# Patient Record
Sex: FEMALE | Race: WHITE | Employment: OTHER | URBAN - METROPOLITAN AREA
[De-identification: names, ages, dates, MRNs, and addresses within clinical notes are randomized per-mention and may not be internally consistent; named-entity substitution may affect disease eponyms.]

---

## 2022-02-16 ENCOUNTER — HOSPITAL ENCOUNTER (EMERGENCY)
Age: 67
Discharge: HOME | End: 2022-02-16
Payer: MEDICARE

## 2022-02-16 VITALS
HEART RATE: 84 BPM | DIASTOLIC BLOOD PRESSURE: 82 MMHG | OXYGEN SATURATION: 96 % | WEIGHT: 249.12 LBS | BODY MASS INDEX: 41.51 KG/M2 | HEIGHT: 65 IN | TEMPERATURE: 98.3 F | SYSTOLIC BLOOD PRESSURE: 150 MMHG | RESPIRATION RATE: 16 BRPM

## 2022-02-16 DIAGNOSIS — R11.2 NAUSEA VOMITING AND DIARRHEA: Primary | ICD-10-CM

## 2022-02-16 DIAGNOSIS — N39.0 URINARY TRACT INFECTION WITHOUT HEMATURIA, SITE UNSPECIFIED: ICD-10-CM

## 2022-02-16 DIAGNOSIS — R19.7 NAUSEA VOMITING AND DIARRHEA: Primary | ICD-10-CM

## 2022-02-16 LAB
A/G RATIO: 1.2 (ref 1.1–2.2)
ALBUMIN SERPL-MCNC: 4.1 G/DL (ref 3.4–5)
ALP BLD-CCNC: 69 U/L (ref 40–129)
ALT SERPL-CCNC: 14 U/L (ref 10–40)
ANION GAP SERPL CALCULATED.3IONS-SCNC: 12 MMOL/L (ref 3–16)
AST SERPL-CCNC: 20 U/L (ref 15–37)
BACTERIA: ABNORMAL /HPF
BASOPHILS ABSOLUTE: 0 K/UL (ref 0–0.2)
BASOPHILS RELATIVE PERCENT: 0.2 %
BILIRUB SERPL-MCNC: 0.4 MG/DL (ref 0–1)
BILIRUBIN URINE: NEGATIVE
BLOOD, URINE: NEGATIVE
BUN BLDV-MCNC: 16 MG/DL (ref 7–20)
CALCIUM SERPL-MCNC: 9.2 MG/DL (ref 8.3–10.6)
CELLULAR CASTS: ABNORMAL /LPF
CHLORIDE BLD-SCNC: 98 MMOL/L (ref 99–110)
CLARITY: ABNORMAL
CO2: 28 MMOL/L (ref 21–32)
COLOR: YELLOW
COMMENT UA: ABNORMAL
CREAT SERPL-MCNC: 0.5 MG/DL (ref 0.6–1.2)
EOSINOPHILS ABSOLUTE: 0 K/UL (ref 0–0.6)
EOSINOPHILS RELATIVE PERCENT: 0.2 %
EPITHELIAL CELLS, UA: ABNORMAL /HPF (ref 0–5)
GFR AFRICAN AMERICAN: >60
GFR NON-AFRICAN AMERICAN: >60
GLUCOSE BLD-MCNC: 124 MG/DL (ref 70–99)
GLUCOSE URINE: NEGATIVE MG/DL
HCT VFR BLD CALC: 39.8 % (ref 36–48)
HEMOGLOBIN: 13.4 G/DL (ref 12–16)
KETONES, URINE: NEGATIVE MG/DL
LEUKOCYTE ESTERASE, URINE: ABNORMAL
LIPASE: 17 U/L (ref 13–60)
LYMPHOCYTES ABSOLUTE: 0.2 K/UL (ref 1–5.1)
LYMPHOCYTES RELATIVE PERCENT: 4 %
MAGNESIUM: 2.1 MG/DL (ref 1.8–2.4)
MCH RBC QN AUTO: 30.8 PG (ref 26–34)
MCHC RBC AUTO-ENTMCNC: 33.7 G/DL (ref 31–36)
MCV RBC AUTO: 91.5 FL (ref 80–100)
MICROSCOPIC EXAMINATION: YES
MONOCYTES ABSOLUTE: 0.3 K/UL (ref 0–1.3)
MONOCYTES RELATIVE PERCENT: 4.8 %
NEUTROPHILS ABSOLUTE: 4.9 K/UL (ref 1.7–7.7)
NEUTROPHILS RELATIVE PERCENT: 90.8 %
NITRITE, URINE: POSITIVE
PDW BLD-RTO: 14.8 % (ref 12.4–15.4)
PH UA: 6.5 (ref 5–8)
PLATELET # BLD: 137 K/UL (ref 135–450)
PMV BLD AUTO: 8.8 FL (ref 5–10.5)
POTASSIUM REFLEX MAGNESIUM: 3.4 MMOL/L (ref 3.5–5.1)
PROTEIN UA: NEGATIVE MG/DL
RBC # BLD: 4.35 M/UL (ref 4–5.2)
RBC UA: ABNORMAL /HPF (ref 0–4)
SODIUM BLD-SCNC: 138 MMOL/L (ref 136–145)
SPECIFIC GRAVITY UA: 1.02 (ref 1–1.03)
T4 FREE: 1.2 NG/DL (ref 0.9–1.8)
TOTAL PROTEIN: 7.4 G/DL (ref 6.4–8.2)
TSH REFLEX: 6.36 UIU/ML (ref 0.27–4.2)
URINE REFLEX TO CULTURE: ABNORMAL
URINE TYPE: ABNORMAL
UROBILINOGEN, URINE: 0.2 E.U./DL
WBC # BLD: 5.4 K/UL (ref 4–11)
WBC UA: ABNORMAL /HPF (ref 0–5)

## 2022-02-16 PROCEDURE — 81001 URINALYSIS AUTO W/SCOPE: CPT

## 2022-02-16 PROCEDURE — 84443 ASSAY THYROID STIM HORMONE: CPT

## 2022-02-16 PROCEDURE — 80053 COMPREHEN METABOLIC PANEL: CPT

## 2022-02-16 PROCEDURE — 83690 ASSAY OF LIPASE: CPT

## 2022-02-16 PROCEDURE — 84439 ASSAY OF FREE THYROXINE: CPT

## 2022-02-16 PROCEDURE — 83735 ASSAY OF MAGNESIUM: CPT

## 2022-02-16 PROCEDURE — 85025 COMPLETE CBC W/AUTO DIFF WBC: CPT

## 2022-02-16 PROCEDURE — 99282 EMERGENCY DEPT VISIT SF MDM: CPT

## 2022-02-16 RX ORDER — 0.9 % SODIUM CHLORIDE 0.9 %
500 INTRAVENOUS SOLUTION INTRAVENOUS ONCE
Status: DISCONTINUED | OUTPATIENT
Start: 2022-02-16 | End: 2022-02-16 | Stop reason: HOSPADM

## 2022-02-16 RX ORDER — GREEN TEA/HOODIA GORDONII 315-12.5MG
1 CAPSULE ORAL DAILY
Qty: 10 TABLET | Refills: 0 | Status: SHIPPED | OUTPATIENT
Start: 2022-02-16 | End: 2022-02-26

## 2022-02-16 RX ORDER — CEPHALEXIN 500 MG/1
500 CAPSULE ORAL 2 TIMES DAILY
Qty: 6 CAPSULE | Refills: 0 | Status: SHIPPED | OUTPATIENT
Start: 2022-02-16 | End: 2022-02-19

## 2022-02-16 RX ORDER — ONDANSETRON 4 MG/1
4-8 TABLET, ORALLY DISINTEGRATING ORAL EVERY 12 HOURS PRN
Qty: 12 TABLET | Refills: 0 | Status: SHIPPED | OUTPATIENT
Start: 2022-02-16

## 2022-02-16 ASSESSMENT — PAIN SCALES - GENERAL: PAINLEVEL_OUTOF10: 0

## 2022-02-16 ASSESSMENT — ENCOUNTER SYMPTOMS
VOMITING: 1
NAUSEA: 1
RESPIRATORY NEGATIVE: 1

## 2022-02-16 NOTE — ED PROVIDER NOTES
[address]  [address] [address]  Dept: [phone]  Loc: [phone]  eMERGENCYdEPARTMENT eNCOUnter      Pt Name: Hayley Francis  MRN: [id number]  [place] 1955  Date of evaluation: 2/16/2022  Provider:FLAQUITO Strauss CNP     Independently evaluated by the advanced practice provider    CHIEF COMPLAINT       Chief Complaint   Patient presents with    Emesis     since last night. unable to tolerate PO. denies sick contacts    Diarrhea       CRITICAL CARE TIME       HISTORY OF PRESENT ILLNESS  (Location/Symptom, Timing/Onset, Context/Setting, Quality, Duration,Modifying Factors, Severity.)   Hayley Francis is a 78 y.o. female who presents to the emergency department past medical history depression, DM, obesity, GERD, hypertension and thyroid disease peer    Patient presents ambulatory via private vehicle to the emergency department reporting that around midnight last night she started with nausea vomiting and diarrhea. She has had at least 5 episodes of vomiting with emesis production that is nonbilious and nonbloody. She also reports at least 5 episodes of watery loose diarrhea. Denies fever. Denies chills. Denies chest pain or abdominal pain. Denies lightheadedness or dizziness. Patient denies any known ill contacts. She last ate vegetable soup at her sister-in-law's house yesterday evening and then had a scoop of peanut butter before going to bed. She does report she has UTIs periodically. COVID vaccinated. No recent hospitalizations. No new medications. Patient reports that she has not ate anything today and when she drinks water within a couple of hours she vomits. Nursing Notes were reviewedand agreed with or any disagreements were addressed in the HPI.     REVIEW OF SYSTEMS    (2-9 systems for level 4, 10 or more for level 5)     Review of Systems   Constitutional: Positive for appetite change. Respiratory: Negative. Cardiovascular: Negative. Gastrointestinal: Positive for nausea and vomiting. Genitourinary: Negative. Musculoskeletal: Negative. Skin: Negative. Except as noted above the remainder of the review of systems was reviewed and negative. PAST MEDICAL HISTORY         Diagnosis Date    Depression     Diabetes mellitus ([address]     GERD (gastroesophageal reflux disease)     Hypertension     Thyroid disease        SURGICAL HISTORY           Procedure Laterality Date    TOTAL KNEE ARTHROPLASTY         CURRENT MEDICATIONS     [unfilled]    ALLERGIES     Patient has no known allergies. FAMILY HISTORY     History reviewed. No pertinent family history. No family status information on file. SOCIAL HISTORY      reports that she has never smoked. She has never used smokeless tobacco. She reports previous alcohol use. She reports previous drug use. PHYSICAL EXAM    (up to 7 for level 4, 8 or more for level 5)     ED Triage Vitals [02/16/22 1408]   Enc Vitals Group      BP (!) 144/79      Pulse 88      Resp 14      Temp 98.3 °F (36.8 °C)      Temp Source Temporal      SpO2 94 %      Weight 249 lb 1.9 oz (113 kg)      Height 5' 5\" (1.651 m)      Head Circumference       Peak Flow       Pain Score       Pain Loc       Pain Edu? Excl. in [address]? Physical Exam  Vitals and nursing note reviewed. Constitutional:       General: She is not in acute distress. Appearance: Normal appearance. She is obese. She is not ill-appearing or toxic-appearing. Eyes:      Pupils: Pupils are equal, round, and reactive to light. Cardiovascular:      Rate and Rhythm: Normal rate and regular rhythm. Pulses: Normal pulses. Heart sounds: Normal heart sounds. Pulmonary:      Effort: Pulmonary effort is normal.      Breath sounds: Normal breath sounds. Abdominal:      General: Bowel sounds are normal. There is no distension.       Palpations: Abdomen is soft. Tenderness: There is no abdominal tenderness. There is no right CVA tenderness, left CVA tenderness, guarding or rebound. Hernia: No hernia is present. Comments: Abdomen is obese   Skin:     General: Skin is warm and dry. Capillary Refill: Capillary refill takes less than 2 seconds. Neurological:      General: No focal deficit present. Mental Status: She is alert.            DIAGNOSTIC RESULTS     EKG: All EKG's are interpreted by the Emergency Department Physician who either signs or Co-signs this chart in the absence of a cardiologist.    RADIOLOGY:   Non-plain film images such as CT, Ultrasound and MRI are read by the radiologist. Serina Storey radiographic images are visualized and preliminarilyinterpreted by the emergency physician with the below findings:    Interpretation per the Radiologist below,if available at the time of this note:    No orders to display         LABS:  Labs Reviewed   CBC WITH AUTO DIFFERENTIAL - Abnormal; Notable for the following components:       Result Value    Lymphocytes Absolute 0.2 (*)     All other components within normal limits    Narrative:     Performed at:  [address] [address]   Phone [phone]   COMPREHENSIVE METABOLIC PANEL W/ REFLEX TO MG FOR LOW K - Abnormal; Notable for the following components:    Potassium reflex Magnesium 3.4 (*)     Chloride 98 (*)     Glucose 124 (*)     CREATININE 0.5 (*)     All other components within normal limits    Narrative:     Performed at:  [facility]  [address] [address]   Phone [phone]   URINALYSIS WITH REFLEX TO CULTURE - Abnormal; Notable for the following components:    Clarity, UA SL CLOUDY (*)     Nitrite, Urine POSITIVE (*)     Leukocyte Esterase, Urine SMALL (*)     All other components within normal limits    Narrative:     Performed at:  [address] [facility]  [address] [address]   Phone [phone]   TSH WITH REFLEX - Abnormal; Notable for the following components:    TSH 6.36 (*)     All other components within normal limits    Narrative:     Performed at:  [facility]  [address] [facility] [address]   Phone [phone]   MICROSCOPIC URINALYSIS - Abnormal; Notable for the following components:    Cellular Cast, UA 1-3 RBC (*)     RBC, UA 11-20 (*)     Epithelial Cells, UA 11-20 (*)     Bacteria, UA 1+ (*)     All other components within normal limits    Narrative:     Performed at:  [facility]  [address] [facility] [address]   Phone [phone]   GASTROINTESTINAL PANEL, MOLECULAR   C DIFF TOXIN/ANTIGEN   LIPASE    Narrative:     Performed at:  [facility]  [address], [facility]   Phone [phone]   MAGNESIUM    Narrative:     Performed at:  [facility]  [address] [facility] [address]   Phone [phone]   T4, FREE    Narrative:     Performed at:  [facility]  [address] [facility] [address]   Phone [phone]       All other labs were within normal range or not returned as of this dictation. EMERGENCY DEPARTMENT COURSE and DIFFERENTIAL DIAGNOSIS/MDM:   Vitals:    Vitals:    02/16/22 1408   BP: (!) 144/79   Pulse: 88   Resp: 14   Temp: 98.3 °F (36.8 °C)   TempSrc: Temporal   SpO2: 94%   Weight: 249 lb 1.9 oz (113 kg)   Height: 5' 5\" (1.651 m)     Medications   0.9 % sodium chloride bolus (has no administration in time range)       MDM   Patient was seen and evaluated per myself. Dr. Aaron Shah present available for consultation as needed. Physical exam was pretty unremarkable. Patient is nontoxic in presentation.   Chest is clear and abdomen is soft obese without rebound or guarding. Plan: Urinalysis, CBC, metabolic panel, IV fluids and oral fluid challenge. Patient is not in pain and is currently not nauseous therefore no medications ordered. Differential diagnosis: UTI, viral illness, foodborne, electrolyte derangement, sepsis, metabolic acidosis, hyperglycemia    1710: Oral fluid challenge: Tolerated. CBC and metabolic panel unremarkable. However TSH is elevated at 6.36. Her free T4 and T3 are unremarkable. She can follow-up with her primary care physician regarding her levothyroxine dosing. Urinalysis is positive for nitrites, with 1+ bacteria. Epithelial cells 11-20. Do not feel that any further testing is warranted at this time. No evidence of an acute scenario. Patient did tolerate oral fluid challenge. I will treat her empirically for UTI for the next 3 days. If her culture comes back positive her antibiotics can be continued per her primary care physician however the cultures are negative no further antibiotics are wanted. She will be given Zofran for any further nausea symptoms. And I will place her on probiotics for the diarrhea. She had no vomiting and no diarrhea during the ED course. Plan of care and diagnostic study results were discussed with the patient. She verbalized understanding and she was discharged home in good condition. CONSULTS:  None    PROCEDURES:  Procedures    FINAL IMPRESSION      1. Nausea vomiting and diarrhea    2.  Urinary tract infection without hematuria, site unspecified          DISPOSITION/PLAN   [unfilled]    PATIENT REFERRED TO:  Arvin English  [id number]  [phone]    Schedule an appointment as soon as possible for a visit in 3 days      [facility] Emergency Department  [address] [place] [address]  [phone]    As needed, If symptoms worsen      DISCHARGE MEDICATIONS:  New Prescriptions    CEPHALEXIN (KEFLEX) 500 MG CAPSULE    Take 1 capsule by mouth 2 times daily for 3 days    ONDANSETRON (ZOFRAN ODT) 4 MG DISINTEGRATING TABLET    Take 1-2 tablets by mouth every 12 hours as needed for Nausea    PROBIOTIC ACIDOPHILUS (FLORANEX) TABS    Take 1 tablet by mouth daily for 10 days       (Please note that portions of this note were completed with a voice recognition program.  Efforts were made to edit the dictations but occasionally words are mis-transcribed.)    Prudence Romberg Puthoff, [address] [facility], APRN - [facility]  02/16/22 8719

## 2022-02-17 NOTE — ED NOTES
Pt provided with oral liquids instead of IV fluids per patient's request.     Hosea Kyle, JENIFFER  02/16/22 2053

## 2022-02-17 NOTE — ED NOTES
Pt finished oral fluids without issue. Pt denies nausea or vomiting at this time.      Yury Magana RN  02/16/22 8103

## 2022-04-21 ENCOUNTER — HOSPITAL ENCOUNTER (EMERGENCY)
Age: 67
Discharge: HOME | End: 2022-04-21
Attending: STUDENT IN AN ORGANIZED HEALTH CARE EDUCATION/TRAINING PROGRAM
Payer: MEDICARE

## 2022-04-21 VITALS
OXYGEN SATURATION: 96 % | SYSTOLIC BLOOD PRESSURE: 157 MMHG | TEMPERATURE: 98.4 F | BODY MASS INDEX: 43.86 KG/M2 | WEIGHT: 263.23 LBS | HEIGHT: 65 IN | DIASTOLIC BLOOD PRESSURE: 74 MMHG | RESPIRATION RATE: 18 BRPM | HEART RATE: 62 BPM

## 2022-04-21 DIAGNOSIS — R04.0 EPISTAXIS: Primary | ICD-10-CM

## 2022-04-21 PROCEDURE — 99283 EMERGENCY DEPT VISIT LOW MDM: CPT

## 2022-04-21 PROCEDURE — 6370000000 HC RX 637 (ALT 250 FOR IP): Performed by: STUDENT IN AN ORGANIZED HEALTH CARE EDUCATION/TRAINING PROGRAM

## 2022-04-21 RX ORDER — METOPROLOL SUCCINATE 25 MG/1
25 TABLET, EXTENDED RELEASE ORAL DAILY
COMMUNITY

## 2022-04-21 RX ORDER — HYDROCHLOROTHIAZIDE 25 MG/1
25 TABLET ORAL DAILY
COMMUNITY

## 2022-04-21 RX ORDER — OXYMETAZOLINE HYDROCHLORIDE 0.05 G/100ML
2 SPRAY NASAL 2 TIMES DAILY
Qty: 1 EACH | Refills: 1 | Status: SHIPPED | OUTPATIENT
Start: 2022-04-21 | End: 2022-05-21

## 2022-04-21 RX ORDER — PIOGLITAZONEHYDROCHLORIDE 30 MG/1
30 TABLET ORAL DAILY
COMMUNITY

## 2022-04-21 RX ORDER — SERTRALINE HYDROCHLORIDE 100 MG/1
100 TABLET, FILM COATED ORAL DAILY
COMMUNITY

## 2022-04-21 RX ORDER — PANTOPRAZOLE SODIUM 40 MG/1
40 TABLET, DELAYED RELEASE ORAL DAILY
COMMUNITY

## 2022-04-21 RX ORDER — OXYMETAZOLINE HYDROCHLORIDE 0.05 G/100ML
2 SPRAY NASAL ONCE
Status: COMPLETED | OUTPATIENT
Start: 2022-04-21 | End: 2022-04-21

## 2022-04-21 RX ORDER — LEVOTHYROXINE SODIUM 0.15 MG/1
150 TABLET ORAL DAILY
COMMUNITY

## 2022-04-21 RX ORDER — AMOXICILLIN 500 MG/1
500 CAPSULE ORAL 3 TIMES DAILY
COMMUNITY

## 2022-04-21 RX ADMIN — OXYMETAZOLINE HCL 2 SPRAY: 0.05 SPRAY NASAL at 16:15

## 2022-04-21 ASSESSMENT — PAIN SCALES - GENERAL: PAINLEVEL_OUTOF10: 0

## 2022-04-21 ASSESSMENT — PAIN - FUNCTIONAL ASSESSMENT: PAIN_FUNCTIONAL_ASSESSMENT: NONE - DENIES PAIN

## 2022-04-21 NOTE — ED NOTES
No further nose bleeding. Gave patient discharge instructions. She states, understanding.  Patient discharged to home     Melissa Vega RN  04/21/22 5376

## 2022-04-21 NOTE — ED NOTES
Had patient  Blow nose. She blew a large blood clot out of right side of nose.  Then placed nose clamp on patient       Kraig Wasserman RN  04/21/22 [phone]

## 2022-04-21 NOTE — ED PROVIDER NOTES
Primary Care Physician: Maureen Reyse MD   Attending Physician: Elizabeth Clinton MD     History   Chief Complaint   Patient presents with    Epistaxis     pt states nose bleeds have been on and off over the past 2-3 days; pt states it got worse today; pt states she often has nose bleeds but \"not as bad as this\"        HPI   Edgar Genao  is a 78 y.o. female history of depression, diabetes, hypertension, thyroid disease who presents accompanied by son complaining of intermittent nosebleed for the past 2 to 3 days. Stated that the symptoms got worse today forcing her to seek care. She stated that she has not had significant nosebleeds in the past.  She denies any other acute complaints at this time. Past Medical History:   Diagnosis Date    Depression     Diabetes mellitus (HCC)     GERD (gastroesophageal reflux disease)     Hypertension     Thyroid disease         Past Surgical History:   Procedure Laterality Date    CHOLECYSTECTOMY      TOTAL KNEE ARTHROPLASTY          History reviewed. No pertinent family history. Social History     Socioeconomic History    Marital status:      Spouse name: None    Number of children: None    Years of education: None    Highest education level: None   Occupational History    None   Tobacco Use    Smoking status: Never Smoker    Smokeless tobacco: Never Used   Vaping Use    Vaping Use: Never used   Substance and Sexual Activity    Alcohol use: Not Currently    Drug use: Never    Sexual activity: Not Currently   Other Topics Concern    None   Social History Narrative    None     Social Determinants of Health     Financial Resource Strain:     Difficulty of Paying Living Expenses: Not on file   Food Insecurity:     Worried About Running Out of Food in the Last Year: Not on file    Karina of Food in the Last Year: Not on file   Transportation Needs:     Lack of Transportation (Medical):  Not on file    Lack of Transportation (Non-Medical): Not on file   Physical Activity:     Days of Exercise per Week: Not on file    Minutes of Exercise per Session: Not on file   Stress:     Feeling of Stress : Not on file   Social Connections:     Frequency of Communication with Friends and Family: Not on file    Frequency of Social Gatherings with Friends and Family: Not on file    Attends [employer] Services: Not on file    Active Member of [address] [place] [address] or Organizations: Not on file    Attends Club or Organization Meetings: Not on file    Marital Status: Not on file   Intimate Partner Violence:     Fear of Current or Ex-Partner: Not on file    Emotionally Abused: Not on file    Physically Abused: Not on file    Sexually Abused: Not on file   Housing Stability:     Unable to Pay for Housing in the Last Year: Not on file    Number of [place] in the Last Year: Not on file    Unstable Housing in the Last Year: Not on file        Review of Systems   10 total systems reviewed and found to be negative unless otherwise noted in HPI     Physical Exam   BP (!) 157/74   Pulse 62   Temp 98.4 °F (36.9 °C) (Oral)   Resp 18   Ht 5' 5\" (1.651 m)   Wt 263 lb 3.7 oz (119.4 kg)   SpO2 96%   BMI 43.80 kg/m²      CONSTITUTIONAL: Well appearing, in no acute distress   HEAD: atraumatic, normocephalic   EYES: PERRL, No injection, discharge or scleral icterus. ENT: Moist mucous membranes. NECK: Normal ROM, NO LAD   CARDIOVASCULAR: Regular rate and rhythm. No murmurs or gallop. PULMONARY/CHEST: Airway patent. No retractions. Breath sounds clear with good air entry bilaterally. ABDOMEN: Soft, Non-distended and non-tender, without guarding or rebound. SKIN: Acyanotic, warm, dry   MUSCULOSKELETAL: No swelling, tenderness or deformity   NEUROLOGICAL: Awake and oriented x 3. Pulses intact. Grossly nonfocal   Nursing note and vitals reviewed.      ED Course & Medical Decision Making   Medications   oxymetazoline (AFRIN) 0.05 % nasal spray 2 spray (2 sprays Each Nostril Given by Other 4/21/22 1615)      Labs Reviewed - No data to display   No orders to display     PROCEDURES:   Procedures    ASSESSMENT AND PLAN:  [id number], Doretha Devlin is a 78 y.o. female  history of depression, diabetes, hypertension, thyroid disease who presents accompanied by son complaining of intermittent nosebleed for the past 2 to 3 days. Stated that the symptoms got worse today forcing her to seek care. She stated that she has not had significant nosebleeds in the past.  She denies any other acute complaints at this time. On exam her bleeding has stopped and patient was in no distress vitals were within normal limits. I did not use sprays of Afrin and monitored her in the emergency room for 15 minutes. Reevaluation patient was still not bleeding. At this time patient is stable she was discharged home with recommendation to follow-up with ENT. ClINICAL IMPRESSION:  1. Epistaxis          PATIENT REFERRED TO:  Dani Bauer  [id number]  [phone]    Schedule an appointment as soon as possible for a visit       [address]  [address] [address]  [phone]    Schedule an appointment as soon as possible for a visit in 2 days        DISCHARGE MEDICATIONS:  New Prescriptions    OXYMETAZOLINE (12 HOUR NASAL SPRAY) 0.05 % NASAL SPRAY    2 sprays by Nasal route 2 times daily     DISCONTINUED MEDICATIONS:  Discontinued Medications    No medications on file     DISPOSITION Decision To Discharge 04/21/2022 04:29:28 PM  -We have instructed the patient, Doretha Devlin) to return to the ED or call her PCP if her pain/symptoms worsen. -Findings and recommendations explained to patient, and son.  They expressed understanding and agreed with the plan.    ___________________________________________________________________________________  _________________________________________________________________________________________  This record is transcribed utilizing voice recognition technology. There are inherent limitations in this technology. In addition, there may be limitations in editing of this report. If there are any discrepancies, please contact me directly.         Angel Durán MD  04/21/22 2610

## 2022-04-21 NOTE — ED TRIAGE NOTES
Pt c/o epistaxis that started 2-3 days ago but got progressively worse today. Pt states she often gets nose bleeds. Pt states she is not on any blood thinners but she does take ASA everyday. Pt blew nose and blood clot was expelled.

## 2024-01-07 PROCEDURE — 96375 TX/PRO/DX INJ NEW DRUG ADDON: CPT

## 2024-01-07 PROCEDURE — 96376 TX/PRO/DX INJ SAME DRUG ADON: CPT

## 2024-01-07 PROCEDURE — 96361 HYDRATE IV INFUSION ADD-ON: CPT

## 2024-01-07 PROCEDURE — 96374 THER/PROPH/DIAG INJ IV PUSH: CPT

## 2024-01-07 PROCEDURE — 99285 EMERGENCY DEPT VISIT HI MDM: CPT

## 2024-01-08 ENCOUNTER — APPOINTMENT (OUTPATIENT)
Dept: GENERAL RADIOLOGY | Age: 69
End: 2024-01-08
Payer: MEDICARE

## 2024-01-08 ENCOUNTER — HOSPITAL ENCOUNTER (INPATIENT)
Age: 69
LOS: 4 days | Discharge: HOME | End: 2024-01-12
Attending: INTERNAL MEDICINE | Admitting: HOSPITALIST
Payer: MEDICARE

## 2024-01-08 ENCOUNTER — APPOINTMENT (OUTPATIENT)
Dept: CT IMAGING | Age: 69
End: 2024-01-08
Payer: MEDICARE

## 2024-01-08 ENCOUNTER — HOSPITAL ENCOUNTER (EMERGENCY)
Age: 69
Discharge: ANOTHER HOSPITAL | End: 2024-01-08
Attending: EMERGENCY MEDICINE
Payer: MEDICARE

## 2024-01-08 VITALS
BODY MASS INDEX: 42.34 KG/M2 | OXYGEN SATURATION: 93 % | DIASTOLIC BLOOD PRESSURE: 66 MMHG | RESPIRATION RATE: 28 BRPM | SYSTOLIC BLOOD PRESSURE: 132 MMHG | WEIGHT: 254.41 LBS | HEART RATE: 120 BPM | TEMPERATURE: 97.9 F

## 2024-01-08 DIAGNOSIS — J98.01 ACUTE BRONCHOSPASM: ICD-10-CM

## 2024-01-08 DIAGNOSIS — R09.02 HYPOXIA: ICD-10-CM

## 2024-01-08 DIAGNOSIS — I48.91 ATRIAL FIBRILLATION WITH RAPID VENTRICULAR RESPONSE (HCC): Primary | ICD-10-CM

## 2024-01-08 DIAGNOSIS — E87.6 HYPOKALEMIA: ICD-10-CM

## 2024-01-08 DIAGNOSIS — E87.1 HYPONATREMIA: ICD-10-CM

## 2024-01-08 LAB
ALBUMIN SERPL-MCNC: 3.7 G/DL (ref 3.4–5)
ALBUMIN SERPL-MCNC: 3.8 G/DL (ref 3.4–5)
ALBUMIN/GLOB SERPL: 1.2 {RATIO} (ref 1.1–2.2)
ALP SERPL-CCNC: 92 U/L (ref 40–129)
ALT SERPL-CCNC: 21 U/L (ref 10–40)
ANION GAP SERPL CALCULATED.3IONS-SCNC: 11 MMOL/L (ref 3–16)
ANION GAP SERPL CALCULATED.3IONS-SCNC: 13 MMOL/L (ref 3–16)
ANTI-XA UNFRAC HEPARIN: 0.18 IU/ML (ref 0.3–0.7)
ANTI-XA UNFRAC HEPARIN: 0.3 IU/ML (ref 0.3–0.7)
APTT BLD: 27.1 SEC (ref 22.7–35.9)
APTT BLD: 37.1 SEC (ref 22.7–35.9)
APTT BLD: 47.1 SEC (ref 22.7–35.9)
AST SERPL-CCNC: 25 U/L (ref 15–37)
BACTERIA URNS QL MICRO: ABNORMAL /HPF
BASOPHILS # BLD: 0 K/UL (ref 0–0.2)
BASOPHILS NFR BLD: 0.3 %
BILIRUB SERPL-MCNC: 0.4 MG/DL (ref 0–1)
BILIRUB UR QL STRIP.AUTO: NEGATIVE
BUN SERPL-MCNC: 11 MG/DL (ref 7–20)
BUN SERPL-MCNC: 7 MG/DL (ref 7–20)
CALCIUM SERPL-MCNC: 9.3 MG/DL (ref 8.3–10.6)
CALCIUM SERPL-MCNC: 9.3 MG/DL (ref 8.3–10.6)
CHLORIDE SERPL-SCNC: 88 MMOL/L (ref 99–110)
CHLORIDE SERPL-SCNC: 97 MMOL/L (ref 99–110)
CLARITY UR: CLEAR
CO2 SERPL-SCNC: 24 MMOL/L (ref 21–32)
CO2 SERPL-SCNC: 28 MMOL/L (ref 21–32)
COLOR UR: YELLOW
CREAT SERPL-MCNC: 0.5 MG/DL (ref 0.6–1.2)
CREAT SERPL-MCNC: <0.5 MG/DL (ref 0.6–1.2)
D DIMER: 0.44 UG/ML FEU (ref 0–0.6)
DEPRECATED RDW RBC AUTO: 13.4 % (ref 12.4–15.4)
DEPRECATED RDW RBC AUTO: 14.5 % (ref 12.4–15.4)
EKG ATRIAL RATE: 163 BPM
EKG ATRIAL RATE: 73 BPM
EKG DIAGNOSIS: NORMAL
EKG DIAGNOSIS: NORMAL
EKG P AXIS: 42 DEGREES
EKG P-R INTERVAL: 148 MS
EKG Q-T INTERVAL: 334 MS
EKG Q-T INTERVAL: 460 MS
EKG QRS DURATION: 102 MS
EKG QRS DURATION: 106 MS
EKG QTC CALCULATION (BAZETT): 506 MS
EKG QTC CALCULATION (BAZETT): 508 MS
EKG R AXIS: -37 DEGREES
EKG R AXIS: -48 DEGREES
EKG T AXIS: -43 DEGREES
EKG T AXIS: 163 DEGREES
EKG VENTRICULAR RATE: 139 BPM
EKG VENTRICULAR RATE: 73 BPM
EOSINOPHIL # BLD: 0.1 K/UL (ref 0–0.6)
EOSINOPHIL NFR BLD: 1.7 %
EPI CELLS #/AREA URNS HPF: ABNORMAL /HPF (ref 0–5)
FLUAV RNA UPPER RESP QL NAA+PROBE: NEGATIVE
FLUBV AG NPH QL: NEGATIVE
GFR SERPLBLD CREATININE-BSD FMLA CKD-EPI: >60 ML/MIN/{1.73_M2}
GFR SERPLBLD CREATININE-BSD FMLA CKD-EPI: >60 ML/MIN/{1.73_M2}
GLUCOSE SERPL-MCNC: 150 MG/DL (ref 70–99)
GLUCOSE SERPL-MCNC: 154 MG/DL (ref 70–99)
GLUCOSE UR STRIP.AUTO-MCNC: NEGATIVE MG/DL
HCT VFR BLD AUTO: 37.9 % (ref 36–48)
HCT VFR BLD AUTO: 40.3 % (ref 36–48)
HGB BLD-MCNC: 12.7 G/DL (ref 12–16)
HGB BLD-MCNC: 13.1 G/DL (ref 12–16)
HGB UR QL STRIP.AUTO: ABNORMAL
IMM GRANULOCYTES # BLD: 0 K/UL (ref 0–0.2)
IMM GRANULOCYTES NFR BLD: 0.3 %
INR PPP: 0.96 (ref 0.84–1.16)
INR PPP: 1.01 (ref 0.84–1.16)
KETONES UR STRIP.AUTO-MCNC: 15 MG/DL
LACTATE BLDV-SCNC: 1.5 MMOL/L (ref 0.4–1.9)
LEUKOCYTE ESTERASE UR QL STRIP.AUTO: NEGATIVE
LYMPHOCYTES # BLD: 1.6 K/UL (ref 1–5.1)
LYMPHOCYTES NFR BLD: 28.2 %
MAGNESIUM SERPL-MCNC: 1.8 MG/DL (ref 1.8–2.4)
MCH RBC QN AUTO: 28.9 PG (ref 26–34)
MCH RBC QN AUTO: 29 PG (ref 26–34)
MCHC RBC AUTO-ENTMCNC: 32.5 G/DL (ref 32–36.4)
MCHC RBC AUTO-ENTMCNC: 33.4 G/DL (ref 31–36)
MCV RBC AUTO: 86.4 FL (ref 80–100)
MCV RBC AUTO: 89.2 FL (ref 80–100)
MONOCYTES # BLD: 0.9 K/UL (ref 0–1.3)
MONOCYTES NFR BLD: 15.8 %
NEUTROPHILS # BLD: 3.1 K/UL (ref 1.7–7.7)
NEUTROPHILS NFR BLD: 53.7 %
NITRITE UR QL STRIP.AUTO: NEGATIVE
NT-PROBNP SERPL-MCNC: 1113 PG/ML (ref 0–124)
PH UR STRIP.AUTO: 7 [PH] (ref 5–8)
PHOSPHATE SERPL-MCNC: 2.9 MG/DL (ref 2.5–4.9)
PLATELET # BLD AUTO: 153 K/UL (ref 135–450)
PLATELET # BLD AUTO: 174 K/UL (ref 135–450)
PMV BLD AUTO: 8.4 FL (ref 5–10.5)
PMV BLD AUTO: 9.8 FL (ref 5–10.5)
POTASSIUM SERPL-SCNC: 3 MMOL/L (ref 3.5–5.1)
POTASSIUM SERPL-SCNC: 3.8 MMOL/L (ref 3.5–5.1)
PROCALCITONIN SERPL IA-MCNC: 0.02 NG/ML (ref 0–0.15)
PROT SERPL-MCNC: 7.1 G/DL (ref 6.4–8.2)
PROT UR STRIP.AUTO-MCNC: NEGATIVE MG/DL
PROTHROMBIN TIME: 12.8 SEC (ref 11.5–14.8)
PROTHROMBIN TIME: 13.3 SEC (ref 11.5–14.8)
RBC # BLD AUTO: 4.39 M/UL (ref 4–5.2)
RBC # BLD AUTO: 4.52 M/UL (ref 4–5.2)
RBC #/AREA URNS HPF: ABNORMAL /HPF (ref 0–4)
RSV AG NOSE QL: NEGATIVE
SARS-COV-2 RDRP RESP QL NAA+PROBE: NOT DETECTED
SODIUM SERPL-SCNC: 127 MMOL/L (ref 136–145)
SODIUM SERPL-SCNC: 134 MMOL/L (ref 136–145)
SP GR UR STRIP.AUTO: 1.01 (ref 1–1.03)
T4 FREE SERPL-MCNC: 2.5 NG/DL (ref 0.9–1.8)
TROPONIN, HIGH SENSITIVITY: 20 NG/L (ref 0–14)
TROPONIN, HIGH SENSITIVITY: 24 NG/L (ref 0–14)
TROPONIN, HIGH SENSITIVITY: 30 NG/L (ref 0–14)
TSH SERPL DL<=0.005 MIU/L-ACNC: <0.01 UIU/ML (ref 0.27–4.2)
UA COMPLETE W REFLEX CULTURE PNL UR: ABNORMAL
UA DIPSTICK W REFLEX MICRO PNL UR: YES
URN SPEC COLLECT METH UR: ABNORMAL
UROBILINOGEN UR STRIP-ACNC: 0.2 E.U./DL
WBC # BLD AUTO: 5.8 K/UL (ref 4–11)
WBC # BLD AUTO: 8.2 K/UL (ref 4–11)
WBC #/AREA URNS HPF: ABNORMAL /HPF (ref 0–5)

## 2024-01-08 PROCEDURE — 93005 ELECTROCARDIOGRAM TRACING: CPT | Performed by: EMERGENCY MEDICINE

## 2024-01-08 PROCEDURE — 71046 X-RAY EXAM CHEST 2 VIEWS: CPT

## 2024-01-08 PROCEDURE — 2060000000 HC ICU INTERMEDIATE R&B

## 2024-01-08 PROCEDURE — 83880 ASSAY OF NATRIURETIC PEPTIDE: CPT

## 2024-01-08 PROCEDURE — 96366 THER/PROPH/DIAG IV INF ADDON: CPT

## 2024-01-08 PROCEDURE — 83735 ASSAY OF MAGNESIUM: CPT

## 2024-01-08 PROCEDURE — 36415 COLL VENOUS BLD VENIPUNCTURE: CPT

## 2024-01-08 PROCEDURE — 6370000000 HC RX 637 (ALT 250 FOR IP): Performed by: HOSPITALIST

## 2024-01-08 PROCEDURE — 6370000000 HC RX 637 (ALT 250 FOR IP): Performed by: EMERGENCY MEDICINE

## 2024-01-08 PROCEDURE — 96368 THER/DIAG CONCURRENT INF: CPT

## 2024-01-08 PROCEDURE — 6360000004 HC RX CONTRAST MEDICATION: Performed by: EMERGENCY MEDICINE

## 2024-01-08 PROCEDURE — 84484 ASSAY OF TROPONIN QUANT: CPT

## 2024-01-08 PROCEDURE — 93010 ELECTROCARDIOGRAM REPORT: CPT | Performed by: INTERNAL MEDICINE

## 2024-01-08 PROCEDURE — 2500000003 HC RX 250 WO HCPCS: Performed by: EMERGENCY MEDICINE

## 2024-01-08 PROCEDURE — 85027 COMPLETE CBC AUTOMATED: CPT

## 2024-01-08 PROCEDURE — 85025 COMPLETE CBC W/AUTO DIFF WBC: CPT

## 2024-01-08 PROCEDURE — 71260 CT THORAX DX C+: CPT

## 2024-01-08 PROCEDURE — 83605 ASSAY OF LACTIC ACID: CPT

## 2024-01-08 PROCEDURE — 96361 HYDRATE IV INFUSION ADD-ON: CPT

## 2024-01-08 PROCEDURE — 85610 PROTHROMBIN TIME: CPT

## 2024-01-08 PROCEDURE — 87807 RSV ASSAY W/OPTIC: CPT

## 2024-01-08 PROCEDURE — 87040 BLOOD CULTURE FOR BACTERIA: CPT

## 2024-01-08 PROCEDURE — 84439 ASSAY OF FREE THYROXINE: CPT

## 2024-01-08 PROCEDURE — 84145 PROCALCITONIN (PCT): CPT

## 2024-01-08 PROCEDURE — 6360000002 HC RX W HCPCS: Performed by: HOSPITALIST

## 2024-01-08 PROCEDURE — 96375 TX/PRO/DX INJ NEW DRUG ADDON: CPT

## 2024-01-08 PROCEDURE — 2580000003 HC RX 258: Performed by: HOSPITALIST

## 2024-01-08 PROCEDURE — 81001 URINALYSIS AUTO W/SCOPE: CPT

## 2024-01-08 PROCEDURE — 84443 ASSAY THYROID STIM HORMONE: CPT

## 2024-01-08 PROCEDURE — 85520 HEPARIN ASSAY: CPT

## 2024-01-08 PROCEDURE — 6370000000 HC RX 637 (ALT 250 FOR IP): Performed by: INTERNAL MEDICINE

## 2024-01-08 PROCEDURE — 99223 1ST HOSP IP/OBS HIGH 75: CPT | Performed by: INTERNAL MEDICINE

## 2024-01-08 PROCEDURE — 80069 RENAL FUNCTION PANEL: CPT

## 2024-01-08 PROCEDURE — 85730 THROMBOPLASTIN TIME PARTIAL: CPT

## 2024-01-08 PROCEDURE — 87804 INFLUENZA ASSAY W/OPTIC: CPT

## 2024-01-08 PROCEDURE — 2580000003 HC RX 258: Performed by: EMERGENCY MEDICINE

## 2024-01-08 PROCEDURE — 87635 SARS-COV-2 COVID-19 AMP PRB: CPT

## 2024-01-08 PROCEDURE — 96365 THER/PROPH/DIAG IV INF INIT: CPT

## 2024-01-08 PROCEDURE — 80053 COMPREHEN METABOLIC PANEL: CPT

## 2024-01-08 PROCEDURE — 99285 EMERGENCY DEPT VISIT HI MDM: CPT

## 2024-01-08 PROCEDURE — 6360000002 HC RX W HCPCS: Performed by: EMERGENCY MEDICINE

## 2024-01-08 PROCEDURE — 85379 FIBRIN DEGRADATION QUANT: CPT

## 2024-01-08 RX ORDER — POLYETHYLENE GLYCOL 3350 17 G/17G
17 POWDER, FOR SOLUTION ORAL DAILY PRN
Status: DISCONTINUED | OUTPATIENT
Start: 2024-01-08 | End: 2024-01-12 | Stop reason: HOSPADM

## 2024-01-08 RX ORDER — POTASSIUM CHLORIDE 750 MG/1
20 TABLET, FILM COATED, EXTENDED RELEASE ORAL ONCE
Status: COMPLETED | OUTPATIENT
Start: 2024-01-08 | End: 2024-01-08

## 2024-01-08 RX ORDER — SODIUM CHLORIDE 0.9 % (FLUSH) 0.9 %
5-40 SYRINGE (ML) INJECTION PRN
Status: DISCONTINUED | OUTPATIENT
Start: 2024-01-08 | End: 2024-01-12 | Stop reason: HOSPADM

## 2024-01-08 RX ORDER — HEPARIN SODIUM 1000 [USP'U]/ML
60 INJECTION, SOLUTION INTRAVENOUS; SUBCUTANEOUS PRN
Status: DISCONTINUED | OUTPATIENT
Start: 2024-01-08 | End: 2024-01-08

## 2024-01-08 RX ORDER — FUROSEMIDE 10 MG/ML
20 INJECTION INTRAMUSCULAR; INTRAVENOUS ONCE
Status: COMPLETED | OUTPATIENT
Start: 2024-01-08 | End: 2024-01-08

## 2024-01-08 RX ORDER — HEPARIN SODIUM 1000 [USP'U]/ML
30 INJECTION, SOLUTION INTRAVENOUS; SUBCUTANEOUS PRN
Status: DISCONTINUED | OUTPATIENT
Start: 2024-01-08 | End: 2024-01-09

## 2024-01-08 RX ORDER — ONDANSETRON 2 MG/ML
4 INJECTION INTRAMUSCULAR; INTRAVENOUS EVERY 6 HOURS PRN
Status: DISCONTINUED | OUTPATIENT
Start: 2024-01-08 | End: 2024-01-12 | Stop reason: HOSPADM

## 2024-01-08 RX ORDER — HEPARIN SODIUM 1000 [USP'U]/ML
30 INJECTION, SOLUTION INTRAVENOUS; SUBCUTANEOUS PRN
Status: DISCONTINUED | OUTPATIENT
Start: 2024-01-08 | End: 2024-01-08

## 2024-01-08 RX ORDER — ACETAMINOPHEN 325 MG/1
650 TABLET ORAL EVERY 6 HOURS PRN
Status: DISCONTINUED | OUTPATIENT
Start: 2024-01-08 | End: 2024-01-12 | Stop reason: HOSPADM

## 2024-01-08 RX ORDER — HEPARIN SODIUM 1000 [USP'U]/ML
4000 INJECTION, SOLUTION INTRAVENOUS; SUBCUTANEOUS ONCE
Status: COMPLETED | OUTPATIENT
Start: 2024-01-08 | End: 2024-01-08

## 2024-01-08 RX ORDER — ACETAMINOPHEN 650 MG/1
650 SUPPOSITORY RECTAL EVERY 6 HOURS PRN
Status: DISCONTINUED | OUTPATIENT
Start: 2024-01-08 | End: 2024-01-12 | Stop reason: HOSPADM

## 2024-01-08 RX ORDER — HEPARIN SODIUM 10000 [USP'U]/100ML
5-30 INJECTION, SOLUTION INTRAVENOUS CONTINUOUS
Status: DISCONTINUED | OUTPATIENT
Start: 2024-01-08 | End: 2024-01-08 | Stop reason: SDUPTHER

## 2024-01-08 RX ORDER — PREDNISONE 20 MG/1
60 TABLET ORAL ONCE
Status: COMPLETED | OUTPATIENT
Start: 2024-01-08 | End: 2024-01-08

## 2024-01-08 RX ORDER — SODIUM CHLORIDE 0.9 % (FLUSH) 0.9 %
5-40 SYRINGE (ML) INJECTION EVERY 12 HOURS SCHEDULED
Status: DISCONTINUED | OUTPATIENT
Start: 2024-01-08 | End: 2024-01-12 | Stop reason: HOSPADM

## 2024-01-08 RX ORDER — IPRATROPIUM BROMIDE AND ALBUTEROL SULFATE 2.5; .5 MG/3ML; MG/3ML
1 SOLUTION RESPIRATORY (INHALATION) ONCE
Status: COMPLETED | OUTPATIENT
Start: 2024-01-08 | End: 2024-01-08

## 2024-01-08 RX ORDER — SERTRALINE HYDROCHLORIDE 100 MG/1
100 TABLET, FILM COATED ORAL DAILY
Status: DISCONTINUED | OUTPATIENT
Start: 2024-01-08 | End: 2024-01-12 | Stop reason: HOSPADM

## 2024-01-08 RX ORDER — PANTOPRAZOLE SODIUM 40 MG/1
40 TABLET, DELAYED RELEASE ORAL DAILY
Status: DISCONTINUED | OUTPATIENT
Start: 2024-01-08 | End: 2024-01-12 | Stop reason: HOSPADM

## 2024-01-08 RX ORDER — 0.9 % SODIUM CHLORIDE 0.9 %
1000 INTRAVENOUS SOLUTION INTRAVENOUS ONCE
Status: COMPLETED | OUTPATIENT
Start: 2024-01-08 | End: 2024-01-08

## 2024-01-08 RX ORDER — HEPARIN SODIUM 1000 [USP'U]/ML
2000 INJECTION, SOLUTION INTRAVENOUS; SUBCUTANEOUS ONCE
Status: DISCONTINUED | OUTPATIENT
Start: 2024-01-08 | End: 2024-01-08

## 2024-01-08 RX ORDER — DILTIAZEM HYDROCHLORIDE 5 MG/ML
10 INJECTION INTRAVENOUS ONCE
Status: COMPLETED | OUTPATIENT
Start: 2024-01-08 | End: 2024-01-08

## 2024-01-08 RX ORDER — HEPARIN SODIUM 10000 [USP'U]/100ML
5-30 INJECTION, SOLUTION INTRAVENOUS CONTINUOUS
Status: DISCONTINUED | OUTPATIENT
Start: 2024-01-08 | End: 2024-01-08

## 2024-01-08 RX ORDER — DILTIAZEM HYDROCHLORIDE 60 MG/1
30 TABLET, FILM COATED ORAL EVERY 6 HOURS SCHEDULED
Status: DISCONTINUED | OUTPATIENT
Start: 2024-01-08 | End: 2024-01-08

## 2024-01-08 RX ORDER — POTASSIUM CHLORIDE 750 MG/1
40 TABLET, FILM COATED, EXTENDED RELEASE ORAL ONCE
Status: COMPLETED | OUTPATIENT
Start: 2024-01-08 | End: 2024-01-08

## 2024-01-08 RX ORDER — HEPARIN SODIUM 10000 [USP'U]/100ML
5-30 INJECTION, SOLUTION INTRAVENOUS CONTINUOUS
Status: DISCONTINUED | OUTPATIENT
Start: 2024-01-08 | End: 2024-01-09

## 2024-01-08 RX ORDER — SODIUM CHLORIDE 9 MG/ML
INJECTION, SOLUTION INTRAVENOUS PRN
Status: DISCONTINUED | OUTPATIENT
Start: 2024-01-08 | End: 2024-01-12 | Stop reason: HOSPADM

## 2024-01-08 RX ORDER — ONDANSETRON 4 MG/1
4 TABLET, ORALLY DISINTEGRATING ORAL EVERY 8 HOURS PRN
Status: DISCONTINUED | OUTPATIENT
Start: 2024-01-08 | End: 2024-01-12 | Stop reason: HOSPADM

## 2024-01-08 RX ORDER — IPRATROPIUM BROMIDE AND ALBUTEROL SULFATE 2.5; .5 MG/3ML; MG/3ML
2 SOLUTION RESPIRATORY (INHALATION) ONCE
Status: COMPLETED | OUTPATIENT
Start: 2024-01-08 | End: 2024-01-08

## 2024-01-08 RX ORDER — HEPARIN SODIUM 1000 [USP'U]/ML
60 INJECTION, SOLUTION INTRAVENOUS; SUBCUTANEOUS PRN
Status: DISCONTINUED | OUTPATIENT
Start: 2024-01-08 | End: 2024-01-09

## 2024-01-08 RX ORDER — LEVOTHYROXINE SODIUM 0.12 MG/1
250 TABLET ORAL DAILY
Status: DISCONTINUED | OUTPATIENT
Start: 2024-01-09 | End: 2024-01-09

## 2024-01-08 RX ORDER — HEPARIN SODIUM 10000 [USP'U]/100ML
0-4000 INJECTION, SOLUTION INTRAVENOUS CONTINUOUS
Status: DISCONTINUED | OUTPATIENT
Start: 2024-01-08 | End: 2024-01-08 | Stop reason: HOSPADM

## 2024-01-08 RX ADMIN — IPRATROPIUM BROMIDE AND ALBUTEROL SULFATE 1 DOSE: .5; 3 SOLUTION RESPIRATORY (INHALATION) at 04:18

## 2024-01-08 RX ADMIN — POTASSIUM CHLORIDE 20 MEQ: 750 TABLET, FILM COATED, EXTENDED RELEASE ORAL at 06:56

## 2024-01-08 RX ADMIN — HEPARIN SODIUM 1000 UNITS/HR: 10000 INJECTION, SOLUTION INTRAVENOUS at 05:42

## 2024-01-08 RX ADMIN — HEPARIN SODIUM 8 UNITS/KG/HR: 10000 INJECTION, SOLUTION INTRAVENOUS at 11:54

## 2024-01-08 RX ADMIN — DILTIAZEM HYDROCHLORIDE 10 MG: 5 INJECTION INTRAVENOUS at 03:44

## 2024-01-08 RX ADMIN — HEPARIN SODIUM 4000 UNITS: 1000 INJECTION INTRAVENOUS; SUBCUTANEOUS at 05:42

## 2024-01-08 RX ADMIN — IPRATROPIUM BROMIDE AND ALBUTEROL SULFATE 2 DOSE: .5; 3 SOLUTION RESPIRATORY (INHALATION) at 01:38

## 2024-01-08 RX ADMIN — SERTRALINE HYDROCHLORIDE 100 MG: 100 TABLET ORAL at 13:05

## 2024-01-08 RX ADMIN — IOMEPROL INJECTION 100 ML: 714 INJECTION, SOLUTION INTRAVASCULAR at 03:35

## 2024-01-08 RX ADMIN — SODIUM CHLORIDE, PRESERVATIVE FREE 10 ML: 5 INJECTION INTRAVENOUS at 19:32

## 2024-01-08 RX ADMIN — PREDNISONE 60 MG: 20 TABLET ORAL at 01:38

## 2024-01-08 RX ADMIN — POTASSIUM CHLORIDE 40 MEQ: 750 TABLET, FILM COATED, EXTENDED RELEASE ORAL at 03:54

## 2024-01-08 RX ADMIN — DILTIAZEM HYDROCHLORIDE 10 MG: 5 INJECTION INTRAVENOUS at 04:19

## 2024-01-08 RX ADMIN — SODIUM CHLORIDE 1000 ML: 9 INJECTION, SOLUTION INTRAVENOUS at 02:45

## 2024-01-08 RX ADMIN — DILTIAZEM HYDROCHLORIDE 30 MG: 30 TABLET, FILM COATED ORAL at 14:19

## 2024-01-08 RX ADMIN — SODIUM CHLORIDE, PRESERVATIVE FREE 10 ML: 5 INJECTION INTRAVENOUS at 12:16

## 2024-01-08 RX ADMIN — DILTIAZEM HYDROCHLORIDE 30 MG: 30 TABLET, FILM COATED ORAL at 23:29

## 2024-01-08 RX ADMIN — METOPROLOL TARTRATE 12.5 MG: 25 TABLET, FILM COATED ORAL at 12:50

## 2024-01-08 RX ADMIN — GUAIFENESIN AND DEXTROMETHORPHAN HYDROBROMIDE 1 TABLET: 600; 30 TABLET, EXTENDED RELEASE ORAL at 14:20

## 2024-01-08 RX ADMIN — GUAIFENESIN AND DEXTROMETHORPHAN HYDROBROMIDE 1 TABLET: 600; 30 TABLET, EXTENDED RELEASE ORAL at 20:42

## 2024-01-08 RX ADMIN — PANTOPRAZOLE SODIUM 40 MG: 40 TABLET, DELAYED RELEASE ORAL at 12:50

## 2024-01-08 RX ADMIN — HEPARIN SODIUM 10 UNITS/KG/HR: 10000 INJECTION, SOLUTION INTRAVENOUS at 16:13

## 2024-01-08 RX ADMIN — FUROSEMIDE 20 MG: 10 INJECTION, SOLUTION INTRAMUSCULAR; INTRAVENOUS at 04:14

## 2024-01-08 RX ADMIN — WATER 1000 MG: 1 INJECTION INTRAMUSCULAR; INTRAVENOUS; SUBCUTANEOUS at 02:46

## 2024-01-08 RX ADMIN — DILTIAZEM HYDROCHLORIDE 2.5 MG/HR: 5 INJECTION, SOLUTION INTRAVENOUS at 03:50

## 2024-01-08 RX ADMIN — HEPARIN SODIUM 3460 UNITS: 1000 INJECTION INTRAVENOUS; SUBCUTANEOUS at 12:14

## 2024-01-08 ASSESSMENT — PAIN - FUNCTIONAL ASSESSMENT
PAIN_FUNCTIONAL_ASSESSMENT: NONE - DENIES PAIN
PAIN_FUNCTIONAL_ASSESSMENT: NONE - DENIES PAIN
PAIN_FUNCTIONAL_ASSESSMENT: 0-10

## 2024-01-08 ASSESSMENT — PAIN SCALES - GENERAL
PAINLEVEL_OUTOF10: 0

## 2024-01-08 NOTE — ED PROVIDER NOTES
[facility] EMERGENCY DEPT VISIT      Patient Identification  Flori Bartholomew is a 68 y.o. female.    Chief Complaint   Shortness of Breath (When walking.  States she feels like she is wheezing has a cough and has been keeping her up.  Is on her last day of abx for a sinus infection) and Nasal Congestion (With drainage.  )      History of Present Illness:    History was obtained from patient.  Limitations to history:none  This is a  68 y.o. female who presents ambulatory  to the ED with complaints of 3 day h/o cough and shortness or breath with wheezing. She denies h/o asthma or COPD and has never used an inhaler in the past. She is a nonsmoker. She has been having sinus pressure and drainage for a few weeks. Started amoxicillin for 10 days on 12/19 followed by zithromax since 1/4. She also took medrol pack. 12/27 for left shoulder pain. She denies fever. No chest pain. No vomiting or diarrhea. She was taking mucinex with no relief. She is unable to lay down or sleep and gets out of breath with walking.    Past Medical History:   Diagnosis Date    Depression     Diabetes mellitus (HCC)     GERD (gastroesophageal reflux disease)     Hypertension     Thyroid disease        Past Surgical History:   Procedure Laterality Date    CHOLECYSTECTOMY      TOTAL KNEE ARTHROPLASTY           Current Facility-Administered Medications:     dilTIAZem 125 mg in sodium chloride 0.9 % 125 mL infusion, 2.5-15 mg/hr, IntraVENous, Continuous, Monse Zarate MD, Last Rate: 7.5 mL/hr at 01/08/24 0633, 7.5 mg/hr at 01/08/24 0633    heparin 25,000 units in dextrose 5% 250 mL (premix) infusion, 0-4,000 Units/hr, IntraVENous, Continuous, Monse Zarate MD, Last Rate: 10 mL/hr at 01/08/24 0542, 1,000 Units/hr at 01/08/24 0542    potassium chloride (KLOR-CON) extended release tablet 20 mEq, 20 mEq, Oral, Once, Monse Zarate MD    Current Outpatient Medications:     hydroCHLOROthiazide (HYDRODIURIL) 25 MG tablet, Take 25 mg by mouth

## 2024-01-08 NOTE — H&P
V2.0  History and Physical      Name:  Flori Bartholomew /Age/Sex: 1955  (68 y.o. female)   MRN & CSN:  [id number] & [id number] Encounter Date/Time: 2024 10:25 AM EST   Location:  [place] [address] PCP: Arias Michaud Andrez       [facility] Day: 1    Assessment and Plan:   Flori Bartholomew is a 68 y.o. female with pmh of HTN, hypothyroidism, and DMII who presents with SOB.    Her symptoms started 2 weeks. It started as sinus congestion for which she was given 10 days of amoxicillin and she actually improved. She started getting worse again and was started on a Zpack and medrol dose back but she cont to have SOB, LAYNE and wheezing. No chest pain or palpitations. She cont to worsen so she presented the ED where she was found in Afib with RVR. There was concern initialy for sepsis 2/2 pneumonia and she was started on IV fluids but she started to get hypoxic and bilateral crackles so fluids were stopped and she was given lasix and was started on Cardizem drip . She converted to NSR and was transferred here. In the ED she was back to Afib with RVR and cardizem drop was continued . In the room during interview she converted to NSR with HR in 60s.    New onset Afib with RVR  -Pt converted to NSR in outside facility then back to RVR in our ED. When I was in the room she converted back to NSR with HR in 60s. She was on 5 of diltiazem. Wean off as able . She is on metoprolol 25 mg xl at home. Will start metoprolol 12.5 mg Q6 hours for now and adjust as needed   -Cont heparin drip for now   -Echo. TSH  -Will likely need some ischemic eval   -Cardiology consult     Hypoxia likely 2/2 fluid overload   -I dont thinks she has pneumonia, procal is negative. Hold on antibiotics   -Diurese as needed. She recieved a dose of lasix this morning     HTN  Hypothyroidism  DMII  Anxiety   -Hold HCTZ and actos  -SSI  -Cont other home meds     Disposition:   Current Living situation: home  Expected Disposition: home  Estimated D/C: 2-3

## 2024-01-08 NOTE — PROGRESS NOTES
Pending transfer from [facility], ED   Full H&P to follow  Patient will be admitted to PCU given titrating Cardizem and heparin drips  Patient presents to ED with complaints of sinus congestion for 3 weeks for which she has had couple of rounds of antibiotics and steroids.  Has become wheezy with cough over the past 3 days  1-atrial fibrillation with RVR.  Assume no prior history of A-fib.  On Cardizem and heparin drips  2-suspected pneumonia, right lung crackles on auscultation.  CTPA-negative for PE.  Groundglass opacities bilaterally with elevated hemidiaphragm and associated atelectasis.  COVID and influenza-negative  3-electrolyte abnormalities-hypokalemia hyponatremia, hypokalemia  4-elevated proBNP and high-sensitivity troponin, trending down    Please call Dr. Scott Fox once patient arrived in PCU.    Homero Cruz MD

## 2024-01-08 NOTE — PROGRESS NOTES
Pt to C27 from [facility] ED. Pt ambulated to bed with stretcher without complication. Pt afib RVR on monitor. 2L nasal cannula. Denies pain. Peripheral IV's to LAC and RAC, see flowsheets. Vital signs obtained and assessment completed, see flowsheets.     Pt has Cardizem gtt infusing @ 7.5mg/hr and Heparin gtt infusing @ 1000units/hr.     Cardizem gtt titrated down and stopped @10:30-pt HR <70s and converted back to NSR.     Call made to lab to send for Anti-Xa. Resulted at 0.18. Call made to pharmacy to verify okay to adjust gtt based on this resulted Anti-Xa since there was no resulted one from start of gtt from [facility]. Pharmacy agreed to adjust gtt.

## 2024-01-08 NOTE — CONSULTS
[facility]   Cardiac Electrophysiology Consultation   Date: 1/8/2024  Admit Date:  1/8/2024  Reason for Consultation: Atrial fibrillation  Consult Requesting Physician: Chris Hickman MD     No chief complaint on file.    HPI: Flori Bartholomew is a 68 y.o. female with a past medical history significant for morbid obesity, BMI 42, hypertension, type 2 diabetes mellitus, hypothyroidism was transferred to [facility] secondary to worsening shortness of breath, cough and atrial fibrillation.     She started noticing symptoms at least 2 weeks ago in the form of sinus congestion which eventually worsened to shortness of breath and difficulty in breathing.  Last night by 1130, she felt more shortness of breath and hence, called her son.  They initially went to ER at outside facility and noted to be in extensive wheezing and there was a concern about pneumonia.  Hence, initially she was started on IV fluids which was discontinued later.  Secondary to A-fib, she was started on Cardizem infusion and during the ER stay, she got converted to normal sinus rhythm.  During sinus rhythm, patient continues to have difficulty in breathing and extensive wheezing.  Eventually, she was transferred to [facility] ER.  Over here, she was noted to be in atrial fibrillation again and hence, she was initiated on Cardizem infusion.  During the stay in the ER, she got converted to normal sinus rhythm.  Currently, she is in normal sinus rhythm.    When patient was in atrial fibrillation, and in sinus rhythm, she did not feel anything different.  Her predominant complaints are shortness of breath and persistent cough.  She feels that her symptoms are better after receiving nebulization treatment.    Currently, she is in normal sinus rhythm.  She continues to have difficulty in breathing and cough.  She does have bilateral bases.    Telemetry shows normal sinus rhythm    EKG shows sinus rhythm and T inversions in precordial

## 2024-01-08 NOTE — CONSULTS
Clinical Pharmacy Note  Heparin Dosing Consult    Flori Bartholomew is a 68 y.o. female ordered heparin per CAD/STEMI/NSTEMI/UA/AFIB nomogram by Dr. Zarate.     Lab Results   Component Value Date/Time    APTT 27.1 01/08/2024 02:20 AM     Lab Results   Component Value Date/Time    HGB 13.1 01/08/2024 02:20 AM    HCT 40.3 01/08/2024 02:20 AM     01/08/2024 02:20 AM       Ht Readings from Last 1 Encounters:   04/21/22 1.651 m (5' 5\")        Wt Readings from Last 1 Encounters:   01/08/24 115.4 kg (254 lb 6.6 oz)        Assessment/Plan:  Initial bolus: 4000 units  Initial infusion rate: 1000 units/hr  Next anti-Xa:: 1200 on 1/8    Pharmacy will continue to monitor adjust heparin based on anti-Xa results using nomogram below:     CAD/STEMI/NSTEMI/UA/AFIB Heparin Nomogram     Initial Bolus: 60 units/kg Max Bolus: 4,000 units       Initial Rate: 12 units/kg/hr Max Initial Rate: 1,000 units/hr     anti-Xa Bolus Titration   < 0.1 Heparin 60 units/kg bolus Increase drip by 4 units/kg/hr   0.1 - 0.29 Heparin 30 units/kg bolus Increase drip by 2 units/kg/hr   0.3 - 0.7 No Bolus No Change   0.71 - 0.8 No Bolus Decrease drip by 1 units/kg/hr   0.81 - 0.99 No Bolus Decrease drip by 2 units/kg/hr   > 1 Hold Heparin for 1 hour Decrease drip by 3 units/kg/hr     Obtain anti-Xa 6 hours after initial bolus and 6 hours after any dose change until two consecutive therapeutic anti-Xa levels are achieved - then daily.

## 2024-01-09 PROBLEM — I50.30 HEART FAILURE WITH PRESERVED EJECTION FRACTION (HCC): Status: ACTIVE | Noted: 2024-01-09

## 2024-01-09 PROBLEM — R06.02 SOB (SHORTNESS OF BREATH): Status: ACTIVE | Noted: 2024-01-09

## 2024-01-09 LAB
ALBUMIN SERPL-MCNC: 3.4 G/DL (ref 3.4–5)
ANION GAP SERPL CALCULATED.3IONS-SCNC: 9 MMOL/L (ref 3–16)
ANTI-XA UNFRAC HEPARIN: 0.27 IU/ML (ref 0.3–0.7)
ANTI-XA UNFRAC HEPARIN: 0.57 IU/ML (ref 0.3–0.7)
BUN SERPL-MCNC: 12 MG/DL (ref 7–20)
CALCIUM SERPL-MCNC: 9.2 MG/DL (ref 8.3–10.6)
CHLORIDE SERPL-SCNC: 97 MMOL/L (ref 99–110)
CO2 SERPL-SCNC: 27 MMOL/L (ref 21–32)
CREAT SERPL-MCNC: 0.6 MG/DL (ref 0.6–1.2)
GFR SERPLBLD CREATININE-BSD FMLA CKD-EPI: >60 ML/MIN/{1.73_M2}
GLUCOSE SERPL-MCNC: 120 MG/DL (ref 70–99)
INR PPP: 0.97 (ref 0.84–1.16)
MAGNESIUM SERPL-MCNC: 2.2 MG/DL (ref 1.8–2.4)
PHOSPHATE SERPL-MCNC: 3.1 MG/DL (ref 2.5–4.9)
POTASSIUM SERPL-SCNC: 3.6 MMOL/L (ref 3.5–5.1)
POTASSIUM SERPL-SCNC: 4 MMOL/L (ref 3.5–5.1)
PROTHROMBIN TIME: 12.9 SEC (ref 11.5–14.8)
SODIUM SERPL-SCNC: 133 MMOL/L (ref 136–145)
T4 FREE SERPL-MCNC: 2.6 NG/DL (ref 0.9–1.8)
TSH SERPL DL<=0.005 MIU/L-ACNC: <0.01 UIU/ML (ref 0.27–4.2)

## 2024-01-09 PROCEDURE — 6370000000 HC RX 637 (ALT 250 FOR IP): Performed by: INTERNAL MEDICINE

## 2024-01-09 PROCEDURE — C8929 TTE W OR WO FOL WCON,DOPPLER: HCPCS

## 2024-01-09 PROCEDURE — 6360000002 HC RX W HCPCS: Performed by: HOSPITALIST

## 2024-01-09 PROCEDURE — 84132 ASSAY OF SERUM POTASSIUM: CPT

## 2024-01-09 PROCEDURE — 36415 COLL VENOUS BLD VENIPUNCTURE: CPT

## 2024-01-09 PROCEDURE — 6370000000 HC RX 637 (ALT 250 FOR IP): Performed by: HOSPITALIST

## 2024-01-09 PROCEDURE — 2060000000 HC ICU INTERMEDIATE R&B

## 2024-01-09 PROCEDURE — 80069 RENAL FUNCTION PANEL: CPT

## 2024-01-09 PROCEDURE — 6370000000 HC RX 637 (ALT 250 FOR IP): Performed by: NURSE PRACTITIONER

## 2024-01-09 PROCEDURE — 85520 HEPARIN ASSAY: CPT

## 2024-01-09 PROCEDURE — 83735 ASSAY OF MAGNESIUM: CPT

## 2024-01-09 PROCEDURE — 93005 ELECTROCARDIOGRAM TRACING: CPT | Performed by: INTERNAL MEDICINE

## 2024-01-09 PROCEDURE — 85610 PROTHROMBIN TIME: CPT

## 2024-01-09 PROCEDURE — 2580000003 HC RX 258: Performed by: HOSPITALIST

## 2024-01-09 PROCEDURE — 99233 SBSQ HOSP IP/OBS HIGH 50: CPT | Performed by: NURSE PRACTITIONER

## 2024-01-09 RX ORDER — LEVOTHYROXINE SODIUM 0.05 MG/1
50 TABLET ORAL DAILY
Status: ON HOLD | COMMUNITY
End: 2024-01-12 | Stop reason: HOSPADM

## 2024-01-09 RX ORDER — LEVOTHYROXINE SODIUM 0.2 MG/1
200 TABLET ORAL DAILY
Status: ON HOLD | COMMUNITY
End: 2024-01-12 | Stop reason: HOSPADM

## 2024-01-09 RX ORDER — MELOXICAM 15 MG/1
15 TABLET ORAL DAILY
COMMUNITY

## 2024-01-09 RX ORDER — DILTIAZEM HYDROCHLORIDE 120 MG/1
120 CAPSULE, COATED, EXTENDED RELEASE ORAL DAILY
Status: DISCONTINUED | OUTPATIENT
Start: 2024-01-09 | End: 2024-01-12 | Stop reason: HOSPADM

## 2024-01-09 RX ORDER — FLUTICASONE PROPIONATE 50 MCG
1 SPRAY, SUSPENSION (ML) NASAL DAILY
Status: DISCONTINUED | OUTPATIENT
Start: 2024-01-09 | End: 2024-01-12 | Stop reason: HOSPADM

## 2024-01-09 RX ORDER — BENZONATATE 100 MG/1
200 CAPSULE ORAL 3 TIMES DAILY
Status: DISCONTINUED | OUTPATIENT
Start: 2024-01-09 | End: 2024-01-12 | Stop reason: HOSPADM

## 2024-01-09 RX ORDER — GUAIFENESIN/DEXTROMETHORPHAN 100-10MG/5
10 SYRUP ORAL EVERY 4 HOURS PRN
Status: DISCONTINUED | OUTPATIENT
Start: 2024-01-09 | End: 2024-01-12 | Stop reason: HOSPADM

## 2024-01-09 RX ORDER — ALBUTEROL SULFATE 2.5 MG/3ML
2.5 SOLUTION RESPIRATORY (INHALATION) EVERY 6 HOURS PRN
Status: DISCONTINUED | OUTPATIENT
Start: 2024-01-09 | End: 2024-01-10

## 2024-01-09 RX ORDER — POTASSIUM CHLORIDE 20 MEQ/1
20 TABLET, EXTENDED RELEASE ORAL 2 TIMES DAILY WITH MEALS
Status: COMPLETED | OUTPATIENT
Start: 2024-01-09 | End: 2024-01-09

## 2024-01-09 RX ADMIN — DILTIAZEM HYDROCHLORIDE 30 MG: 30 TABLET, FILM COATED ORAL at 06:18

## 2024-01-09 RX ADMIN — APIXABAN 5 MG: 5 TABLET, FILM COATED ORAL at 20:36

## 2024-01-09 RX ADMIN — SODIUM CHLORIDE, PRESERVATIVE FREE 10 ML: 5 INJECTION INTRAVENOUS at 09:28

## 2024-01-09 RX ADMIN — POTASSIUM CHLORIDE 20 MEQ: 1500 TABLET, EXTENDED RELEASE ORAL at 16:07

## 2024-01-09 RX ADMIN — PANTOPRAZOLE SODIUM 40 MG: 40 TABLET, DELAYED RELEASE ORAL at 09:28

## 2024-01-09 RX ADMIN — DILTIAZEM HYDROCHLORIDE 30 MG: 30 TABLET, FILM COATED ORAL at 20:36

## 2024-01-09 RX ADMIN — FLUTICASONE PROPIONATE 1 SPRAY: 50 SPRAY, METERED NASAL at 18:56

## 2024-01-09 RX ADMIN — HEPARIN SODIUM 12 UNITS/KG/HR: 10000 INJECTION, SOLUTION INTRAVENOUS at 03:42

## 2024-01-09 RX ADMIN — SERTRALINE HYDROCHLORIDE 100 MG: 100 TABLET ORAL at 09:28

## 2024-01-09 RX ADMIN — DILTIAZEM HYDROCHLORIDE 30 MG: 30 TABLET, FILM COATED ORAL at 12:03

## 2024-01-09 RX ADMIN — HEPARIN SODIUM 3460 UNITS: 1000 INJECTION INTRAVENOUS; SUBCUTANEOUS at 03:42

## 2024-01-09 RX ADMIN — GUAIFENESIN SYRUP AND DEXTROMETHORPHAN 10 ML: 100; 10 SYRUP ORAL at 16:07

## 2024-01-09 RX ADMIN — GUAIFENESIN SYRUP AND DEXTROMETHORPHAN 10 ML: 100; 10 SYRUP ORAL at 20:37

## 2024-01-09 RX ADMIN — GUAIFENESIN AND DEXTROMETHORPHAN HYDROBROMIDE 1 TABLET: 600; 30 TABLET, EXTENDED RELEASE ORAL at 09:28

## 2024-01-09 RX ADMIN — BENZONATATE 200 MG: 100 CAPSULE ORAL at 20:35

## 2024-01-09 RX ADMIN — DILTIAZEM HYDROCHLORIDE 30 MG: 30 TABLET, FILM COATED ORAL at 22:48

## 2024-01-09 RX ADMIN — POTASSIUM CHLORIDE 20 MEQ: 1500 TABLET, EXTENDED RELEASE ORAL at 09:28

## 2024-01-09 RX ADMIN — LEVOTHYROXINE SODIUM 250 MCG: 0.12 TABLET ORAL at 06:18

## 2024-01-09 ASSESSMENT — PAIN SCALES - GENERAL: PAINLEVEL_OUTOF10: 0

## 2024-01-09 NOTE — PROGRESS NOTES
Report given to ALANA, RN. Answered all questions and concerns. Pt to be transported via wheelchair.

## 2024-01-09 NOTE — PROGRESS NOTES
Patient switched to 120 mg diltiazem daily extended release after patient had already received the 6 and 12 doses of 30 mg immediate release of previous order of diltiazem. RN contacted ordering provider twice, FLAQUITO Velasco, about administering new order. Messages read by provider, no clarification received. Reached out to Dr. SMALL who reported he would review and respond back. Decision to continue with original order and bridge to extend release tomorrow decided at 1845. Care continues.

## 2024-01-09 NOTE — PROGRESS NOTES
Pt did not have anti Xa orders for heparin drip. RN contacted Dr. Villegas, who put in a one time draw for 0930. RN then was instructed to place orders for q6 lab draw, orders entered telephone with read back. RN sent labels to lab, contacted lab at 1025 since lab had not been collected. One therapeutic level far resulted from 11 AM via lab draw (0.57). Care continues.     Called lab at 1730 to follow up on the 5 PM lab draw. Technician came up, but patient was using the restroom. Phlebotomist reported that they would be back after patient was finished using the bathroom to draw the lab. Care continues.

## 2024-01-09 NOTE — CONSULTS
Clinical Pharmacy Consult Note  Medication History     Admit Date: 1/8/2024    Pharmacy consulted to verify home medication list by Ousmane Brizuela MD     List of of current medications patient is taking is complete. Home Medication list in EPIC updated to reflect changes noted below.    Source of information: Notes from recent office visit and talking directly with patient at bedside.    Changes made to medication list:   Medications added:   Meloxicam 15 mg every day  Medication doses adjusted:   Levothyroxine dose now = 250 mcg every day.  Patient confirmed taking 50 mcg tablet with 200 mcg tablet every day.  She takes Protonix 40 mg every day but acknowledges \"supposed to take it twice daily\".    Current Outpatient Medications   Medication Instructions    hydroCHLOROthiazide (HYDRODIURIL) 25 mg, Oral, DAILY    levothyroxine (SYNTHROID) 50 mcg, Oral, DAILY, Take with 200 mcg tablet to make 250 mcg every day.    levothyroxine (SYNTHROID) 200 mcg, Oral, DAILY, Take with 50 mcg tablet to make 250 mcg every day.    meloxicam (MOBIC) 15 mg, Oral, DAILY    metoprolol succinate (TOPROL XL) 25 mg, Oral, DAILY    pantoprazole (PROTONIX) 40 mg, Oral, DAILY    pioglitazone (ACTOS) 30 mg, Oral, DAILY    sertraline (ZOLOFT) 100 mg, Oral, DAILY       Please call with any questions.    Walter Butt PharmD [facility] 1/9/2024 3:38 PM

## 2024-01-09 NOTE — PLAN OF CARE
Problem: Discharge Planning  Goal: Discharge to home or other facility with appropriate resources  Outcome: Progressing  Discharge to home or other facility with appropriate resources: Identify barriers to discharge with patient and caregiver     Problem: ABCDS Injury Assessment  Goal: Absence of physical injury  Outcome: Progressing  Absence of Physical Injury: Implement safety measures based on patient assessment  Note: Orthostatic vital signs obtained at start of shift - see flowsheet for details.  Pt does not meet criteria for orthostasis.  Pt is a High fall risk. See Wilkes Fall Score and ABCDS Injury Risk assessments.   + Screening for Orthostasis and/or + High Fall Risk per WILKES/ABCDS: Explained fall risk precautions to pt and family and rationale behind their use to keep the patient safe. Pt bed is in low position, side rails up, call light and belongings are in reach. Fall wristband applied and present on pts wrist.  Bed alarm on.  Pt encouraged to call for assistance. Will continue with hourly rounds for PO intake, pain needs, toileting and repositioning as needed.

## 2024-01-09 NOTE — PROGRESS NOTES
4 Eyes Skin Assessment     NAME:  Flori Bartholomew  YOB: 1955  MEDICAL RECORD NUMBER:  [id number]    The patient is being assessed for  Admission    I agree that at least one RN has performed a thorough Head to Toe Skin Assessment on the patient. ALL assessment sites listed below have been assessed.      Areas assessed by both nurses:    Head, Face, Ears, Shoulders, Back, Chest, Arms, Elbows, Hands, Sacrum. Buttock, Coccyx, Ischium, Legs. Feet and Heels, and Under Medical Devices         Does the Patient have a Wound? No noted wound(s)       Mateo Prevention initiated by RN: No  Wound Care Orders initiated by RN: No    Pressure Injury (Stage 3,4, Unstageable, DTI, NWPT, and Complex wounds) if present, place Wound referral order by RN under : No    New Ostomies, if present place, Ostomy referral order under : No     Nurse 1 eSignature: Electronically signed by Jasmina Leonard RN on 1/8/24 at 9:32 PM EST    **SHARE this note so that the co-signing nurse can place an eSignature**    Nurse 2 eSignature: Electronically signed by Ana Rosa Cavanaugh RN on 1/8/24 at 10:25 PM EST

## 2024-01-09 NOTE — PROGRESS NOTES
Progress Note  Admit Date: 1/8/2024           CC: F/U for Shortness of Breath        68 y.o. female with pmh of HTN, hypothyroidism,  morbid obesity, BMI 42 and DMII who presented as a transfer rom [facility] with SOB.     Her symptoms started 2 weeks prior to presentation. It started as sinus congestion for which she was given 10 days of amoxicillin and she actually improved. She started getting worse again and was started on a Zpack and medrol dose back but she cont to have SOB, LAYNE and wheezing. She denies fever. She is unable to lay down or sleep and gets out of breath with walking. No chest pain or palpitations. She continued to worsen so she presented the ED where she was found in Afib with RVR.     There was concern initialy for sepsis 2/2 pneumonia sec to right lung crackles on auscultation. and she was started on IV fluids but she started to get hypoxic and bilateral crackles so fluids were stopped and she was given lasix and was started on Cardizem drip . She converted to NSR and was transferred to the [facility]    In the ED she was back to Afib with RVR and cardizem drip was continued. She later converted to NSR with HR in 60s.  CTPA-negative for PE.  Groundglass opacities bilaterally with elevated hemidiaphragm and associated atelectasis.  COVID and influenza-negative; electrolyte abnormalities-hypokalemia hyponatremia, hypokalemia and elevated proBNP and high-sensitivity troponin.      Interval History: Still coughing; no fever    Review of Systems - Negative except  as in HPI.     Scheduled Medications:    levothyroxine  250 mcg Oral Daily    pantoprazole  40 mg Oral Daily    sertraline  100 mg Oral Daily    sodium chloride flush  5-40 mL IntraVENous 2 times per day    dilTIAZem  30 mg Oral 4 times per day      PRN Medications: sodium chloride flush, sodium chloride, ondansetron **OR** ondansetron, polyethylene glycol, acetaminophen **OR** acetaminophen, perflutren lipid microspheres,

## 2024-01-09 NOTE — DISCHARGE INSTRUCTIONS
[facility]  Electrophysiology    Dr. Bob Antonio, CNP  Tere Quinton, CNP  Radha Gonzalez, RN  [phone]    Cardiac Catheter Ablation  Discharge Instructions      WHAT YOU SHOULD KNOW:   Your heart has a special electrical system built into it that controls your heart rhythm. Sometimes there is a problem with this electrical system in the heart muscle. This problem may cause an arrhythmia, or abnormal heart rhythm. If medicine does not correct the problem, or if you do not wish to take medicines long-term, you may need a cardiac ablation. An ablation may also be called a catheter ablation, or a radiofrequency ablation.    An ablation procedure is usually done at the same time as an electrophysiology study. This test is used to \"map out\" the electrical pathways in your heart that control your heart rhythm. This test helps your doctor find the exact spot where the ablation needs to be done. During an ablation, energy is sent through a special catheter to the area of your heart that has the electrical problem. This energy causes a tiny area of the heart muscle to scar, stopping the electrical problem and allowing your heart to beat regularly. Ask your caregiver for more information about your heart problem, and tests and treatments that may be done for it.     AFTER YOU LEAVE:     Home care:    For the next 24 hours please hold pressure to your groin when you cough, sneeze, or change positions.    Activity: After your ablation, rest for one to two days. Avoid using stairs for a few days. When you must use stairs, step up with the leg that was not used for the ablation. Straighten this leg to move the other leg up to the next step without putting stress on it.    No strenuous activity for 1 week, no lifting greater than 10lbs for 1 week.     Do not strain while having a bowel movement. If you are constipated, take               an over-the-counter stool softener such as Metamucil or  the box with the  at the .    IF YOU CANNOT MAKE THIS APPOINTMENT, CALL THE [facility] [place] AT  [phone] AND THEY WILL ASSIST YOU IN MAKING OTHER ARRANGEMENTS.    [place] [id number]   [address]  [place], [place] [address]  PHONE: [phone]         If the monitor comes off but has been in place at least 7 days place in box & return it to the [facility].  If it comes off sooner than 7 days please call the office and we will help you make arrangements to have it replaced.        Avoid excess sweating to maximize wear time.         You are able to shower after 24 hours, however have majority of water hitting back and not directly on monitor. Do not submerge in bath.  No Swimming while wearing the monitor.         If you experience any symptoms while wearing monitor push button and record in booklet.      For questions about monitor call: Customer Service [phone]

## 2024-01-09 NOTE — PROGRESS NOTES
Electrophysiology - PROGRESS NOTE    Admit Date: 1/8/2024     Chief Complaint: New onset atrial fib     Interval History:   Patient seen and examined and notes reviewed. Patient is being followed for new onset atrial fib.  Patient had presented with worsening shortness of breath, cough and new onset AF.  She had started noticing symptoms at least 2 weeks prior initially with sinus congestion that progressed to shortness of breath and difficulty breathing.  She presented to an ED at an outside facility and was noted to be in extensive wheezing and there was a concern for pneumonia.  Initially she was placed on IV fluids that were discontinued later.  She was placed on a diltiazem drip and during her emergency room stay she converted to normal sinus rhythm.  She continued to have symptoms of shortness of breath, difficulty breathing and extensive wheezing.  She was transferred to [facility] ED.  She was noted to be back in atrial fibrillation and was reinitiated on a diltiazem drip.  During that ED visit she converted to normal sinus rhythm.  Her EKG showed sinus rhythm and T wave inversions in her precordial leads.  Her high-sensitivity troponin was mildly elevated however flat.  Her proBNP was 1100.  She did have an echo ordered however that is pending.  She has remained in normal sinus rhythm overnight.  She was taken off of the Dilt drip yesterday and placed on 30 mg every 6 hours and has tolerated the medication.  She is planning on following up with Dr. Burgess at [facility] as it is closer for her.  The plan is also to place a 2-week monitor at discharge.    In: 250 [P.O.:250]  Out: 800    Wt Readings from Last 2 Encounters:   01/08/24 115.4 kg (254 lb 6.6 oz)   01/08/24 115.4 kg (254 lb 6.6 oz)       Data:   Scheduled Meds:   Scheduled Meds:   levothyroxine  250 mcg Oral Daily    pantoprazole  40 mg Oral Daily    sertraline  100 mg Oral Daily     51 minutes in care of the patient and greater than 50% of the time was spent counseling with Flori Bartholomew and coordinating care regarding their diagnosis, treatments and plan of care.

## 2024-01-09 NOTE — PROGRESS NOTES
Pt arrived to unit at 2000 with ED nurse. VS WNL, admission completed. Bed alarm on, in lowest position and call light within reach. POC continued.

## 2024-01-09 NOTE — CARE COORDINATION
3:12 PM      Pt is from home. Pt is indp at baseline. Pt has no services at home currently. Plan to dc home, sw continuing to assess for dc needs.    Electronically signed by FERN Mcduffie, WILLYW on 1/9/2024 at 3:13 PM  [phone]

## 2024-01-10 ENCOUNTER — APPOINTMENT (OUTPATIENT)
Dept: GENERAL RADIOLOGY | Age: 69
End: 2024-01-10
Attending: INTERNAL MEDICINE
Payer: MEDICARE

## 2024-01-10 ENCOUNTER — TELEPHONE (OUTPATIENT)
Dept: CARDIOLOGY CLINIC | Age: 69
End: 2024-01-10

## 2024-01-10 LAB
ALBUMIN SERPL-MCNC: 3.7 G/DL (ref 3.4–5)
ANION GAP SERPL CALCULATED.3IONS-SCNC: 11 MMOL/L (ref 3–16)
BUN SERPL-MCNC: 9 MG/DL (ref 7–20)
CALCIUM SERPL-MCNC: 9.6 MG/DL (ref 8.3–10.6)
CHLORIDE SERPL-SCNC: 98 MMOL/L (ref 99–110)
CO2 SERPL-SCNC: 25 MMOL/L (ref 21–32)
CREAT SERPL-MCNC: 0.5 MG/DL (ref 0.6–1.2)
EKG ATRIAL RATE: 174 BPM
EKG DIAGNOSIS: NORMAL
EKG Q-T INTERVAL: 304 MS
EKG QRS DURATION: 94 MS
EKG QTC CALCULATION (BAZETT): 462 MS
EKG R AXIS: -44 DEGREES
EKG T AXIS: 103 DEGREES
EKG VENTRICULAR RATE: 139 BPM
GFR SERPLBLD CREATININE-BSD FMLA CKD-EPI: >60 ML/MIN/{1.73_M2}
GLUCOSE SERPL-MCNC: 108 MG/DL (ref 70–99)
MAGNESIUM SERPL-MCNC: 2.2 MG/DL (ref 1.8–2.4)
ORGANISM: ABNORMAL
PHOSPHATE SERPL-MCNC: 3 MG/DL (ref 2.5–4.9)
POTASSIUM SERPL-SCNC: 3.9 MMOL/L (ref 3.5–5.1)
REPORT: NORMAL
RESP PATH DNA+RNA PNL L RESP NAA+NON-PRB: ABNORMAL
SODIUM SERPL-SCNC: 134 MMOL/L (ref 136–145)

## 2024-01-10 PROCEDURE — 6370000000 HC RX 637 (ALT 250 FOR IP): Performed by: INTERNAL MEDICINE

## 2024-01-10 PROCEDURE — 2500000003 HC RX 250 WO HCPCS: Performed by: NURSE PRACTITIONER

## 2024-01-10 PROCEDURE — 93010 ELECTROCARDIOGRAM REPORT: CPT | Performed by: INTERNAL MEDICINE

## 2024-01-10 PROCEDURE — 36415 COLL VENOUS BLD VENIPUNCTURE: CPT

## 2024-01-10 PROCEDURE — 2060000000 HC ICU INTERMEDIATE R&B

## 2024-01-10 PROCEDURE — 6370000000 HC RX 637 (ALT 250 FOR IP): Performed by: NURSE PRACTITIONER

## 2024-01-10 PROCEDURE — 80069 RENAL FUNCTION PANEL: CPT

## 2024-01-10 PROCEDURE — 6360000002 HC RX W HCPCS: Performed by: INTERNAL MEDICINE

## 2024-01-10 PROCEDURE — 2580000003 HC RX 258: Performed by: NURSE PRACTITIONER

## 2024-01-10 PROCEDURE — 2580000003 HC RX 258: Performed by: HOSPITALIST

## 2024-01-10 PROCEDURE — 94761 N-INVAS EAR/PLS OXIMETRY MLT: CPT

## 2024-01-10 PROCEDURE — 6370000000 HC RX 637 (ALT 250 FOR IP): Performed by: HOSPITALIST

## 2024-01-10 PROCEDURE — 94640 AIRWAY INHALATION TREATMENT: CPT

## 2024-01-10 PROCEDURE — 93246 EXT ECG>7D<15D RECORDING: CPT | Performed by: INTERNAL MEDICINE

## 2024-01-10 PROCEDURE — 99233 SBSQ HOSP IP/OBS HIGH 50: CPT | Performed by: NURSE PRACTITIONER

## 2024-01-10 PROCEDURE — 71045 X-RAY EXAM CHEST 1 VIEW: CPT

## 2024-01-10 PROCEDURE — 93005 ELECTROCARDIOGRAM TRACING: CPT | Performed by: INTERNAL MEDICINE

## 2024-01-10 PROCEDURE — 83735 ASSAY OF MAGNESIUM: CPT

## 2024-01-10 PROCEDURE — 87633 RESP VIRUS 12-25 TARGETS: CPT

## 2024-01-10 RX ORDER — IPRATROPIUM BROMIDE AND ALBUTEROL SULFATE 2.5; .5 MG/3ML; MG/3ML
1 SOLUTION RESPIRATORY (INHALATION) EVERY 4 HOURS PRN
Status: DISCONTINUED | OUTPATIENT
Start: 2024-01-10 | End: 2024-01-10

## 2024-01-10 RX ORDER — FLECAINIDE ACETATE 100 MG/1
100 TABLET ORAL 2 TIMES DAILY
Status: DISCONTINUED | OUTPATIENT
Start: 2024-01-10 | End: 2024-01-10

## 2024-01-10 RX ORDER — PREDNISONE 20 MG/1
40 TABLET ORAL DAILY
Status: DISCONTINUED | OUTPATIENT
Start: 2024-01-10 | End: 2024-01-12 | Stop reason: HOSPADM

## 2024-01-10 RX ORDER — IPRATROPIUM BROMIDE AND ALBUTEROL SULFATE 2.5; .5 MG/3ML; MG/3ML
1 SOLUTION RESPIRATORY (INHALATION)
Status: DISCONTINUED | OUTPATIENT
Start: 2024-01-10 | End: 2024-01-12 | Stop reason: HOSPADM

## 2024-01-10 RX ORDER — METOPROLOL TARTRATE 1 MG/ML
5 INJECTION, SOLUTION INTRAVENOUS EVERY 6 HOURS PRN
Status: DISCONTINUED | OUTPATIENT
Start: 2024-01-10 | End: 2024-01-12 | Stop reason: HOSPADM

## 2024-01-10 RX ORDER — ALBUTEROL SULFATE 2.5 MG/3ML
2.5 SOLUTION RESPIRATORY (INHALATION) EVERY 4 HOURS PRN
Status: DISCONTINUED | OUTPATIENT
Start: 2024-01-10 | End: 2024-01-12 | Stop reason: HOSPADM

## 2024-01-10 RX ORDER — AMIODARONE HYDROCHLORIDE 200 MG/1
200 TABLET ORAL 2 TIMES DAILY
Status: DISCONTINUED | OUTPATIENT
Start: 2024-01-10 | End: 2024-01-12 | Stop reason: HOSPADM

## 2024-01-10 RX ORDER — IPRATROPIUM BROMIDE AND ALBUTEROL SULFATE 2.5; .5 MG/3ML; MG/3ML
1 SOLUTION RESPIRATORY (INHALATION) EVERY 6 HOURS
Status: DISCONTINUED | OUTPATIENT
Start: 2024-01-10 | End: 2024-01-10

## 2024-01-10 RX ADMIN — SERTRALINE HYDROCHLORIDE 100 MG: 100 TABLET ORAL at 07:58

## 2024-01-10 RX ADMIN — APIXABAN 5 MG: 5 TABLET, FILM COATED ORAL at 07:57

## 2024-01-10 RX ADMIN — BENZONATATE 200 MG: 100 CAPSULE ORAL at 15:09

## 2024-01-10 RX ADMIN — DILTIAZEM HYDROCHLORIDE 15 MG/HR: 5 INJECTION, SOLUTION INTRAVENOUS at 12:15

## 2024-01-10 RX ADMIN — APIXABAN 5 MG: 5 TABLET, FILM COATED ORAL at 19:41

## 2024-01-10 RX ADMIN — DILTIAZEM HYDROCHLORIDE 5 MG/HR: 5 INJECTION, SOLUTION INTRAVENOUS at 01:00

## 2024-01-10 RX ADMIN — GUAIFENESIN SYRUP AND DEXTROMETHORPHAN 10 ML: 100; 10 SYRUP ORAL at 19:41

## 2024-01-10 RX ADMIN — BENZONATATE 200 MG: 100 CAPSULE ORAL at 19:40

## 2024-01-10 RX ADMIN — PREDNISONE 40 MG: 20 TABLET ORAL at 18:25

## 2024-01-10 RX ADMIN — BENZONATATE 200 MG: 100 CAPSULE ORAL at 07:57

## 2024-01-10 RX ADMIN — AMIODARONE HYDROCHLORIDE 200 MG: 200 TABLET ORAL at 13:44

## 2024-01-10 RX ADMIN — DILTIAZEM HYDROCHLORIDE 15 MG/HR: 5 INJECTION, SOLUTION INTRAVENOUS at 20:51

## 2024-01-10 RX ADMIN — GUAIFENESIN SYRUP AND DEXTROMETHORPHAN 10 ML: 100; 10 SYRUP ORAL at 11:36

## 2024-01-10 RX ADMIN — AMIODARONE HYDROCHLORIDE 200 MG: 200 TABLET ORAL at 19:40

## 2024-01-10 RX ADMIN — METOPROLOL TARTRATE 5 MG: 1 INJECTION, SOLUTION INTRAVENOUS at 23:27

## 2024-01-10 RX ADMIN — ACETAMINOPHEN 650 MG: 325 TABLET ORAL at 11:36

## 2024-01-10 RX ADMIN — PANTOPRAZOLE SODIUM 40 MG: 40 TABLET, DELAYED RELEASE ORAL at 07:57

## 2024-01-10 RX ADMIN — ALBUTEROL SULFATE 2.5 MG: 2.5 SOLUTION RESPIRATORY (INHALATION) at 11:55

## 2024-01-10 RX ADMIN — SODIUM CHLORIDE, PRESERVATIVE FREE 10 ML: 5 INJECTION INTRAVENOUS at 07:58

## 2024-01-10 RX ADMIN — IPRATROPIUM BROMIDE AND ALBUTEROL SULFATE 1 DOSE: 2.5; .5 SOLUTION RESPIRATORY (INHALATION) at 18:04

## 2024-01-10 RX ADMIN — FLUTICASONE PROPIONATE 1 SPRAY: 50 SPRAY, METERED NASAL at 07:59

## 2024-01-10 NOTE — PROGRESS NOTES
Electrophysiology - PROGRESS NOTE    Admit Date: 1/8/2024     Chief Complaint: New onset atrial fib     Interval History:   Patient seen and examined and notes reviewed. Patient is being followed for new onset atrial fib.  Patient had presented with worsening shortness of breath, cough and new onset AF.  She had started noticing symptoms at least 2 weeks prior initially with sinus congestion that progressed to shortness of breath and difficulty breathing.  She presented to an ED at an outside facility and was noted to be in extensive wheezing and there was a concern for pneumonia.  Initially she was placed on IV fluids that were discontinued later.  She was placed on a diltiazem drip and during her emergency room stay she converted to normal sinus rhythm.  She continued to have symptoms of shortness of breath, difficulty breathing and extensive wheezing.  She was transferred to [facility] ED.  She was noted to be back in atrial fibrillation and was reinitiated on a diltiazem drip.  During that ED visit she converted to normal sinus rhythm.  Her EKG showed sinus rhythm and T wave inversions in her precordial leads.  Her high-sensitivity troponin was mildly elevated however flat.  Her proBNP was 1100.  Her diltiazem drip was changed to 30 mg every 6 and yesterday she was changed 120 mg daily.  She did go into atrial fibrillation overnight and was placed back on a diltiazem drip.  She has not had a sleep study in the past.  She states she went out of rhythm during the night and had to be woken.  Her heart rates are not that controlled.  She did have an echo that showed severe pulmonary hypertension, moderate TR. This was reviewed w./ Dr. Dejesus who we could manage outpatient as probably not a new finding. Patient had no SOB prior to her recent viral illness.       In: 480 [P.O.:480]  Out: 1300    Wt Readings from Last 2 Encounters:   01/10/24 115.3 kg (254 lb

## 2024-01-10 NOTE — PLAN OF CARE
Problem: Respiratory - Adult  Goal: Achieves optimal ventilation and oxygenation  Outcome: Progressing- patient remained on room air during shift.     Problem: Cardiovascular - Adult  Goal: Absence of cardiac dysrhythmias or at baseline  1/10/2024 1729 by Gina Chapman RN  Outcome: Progressing- patient remained in afib during shift, with rate between 100-140s. Patient remains on diltiazem drip and amiodarone. Care continues.

## 2024-01-10 NOTE — PROGRESS NOTES
Night MART[facility]NE[facility] Cover note:  APRN contacted by RN @4202 for patient returning into A-fib RVR with a heart rate in the 150s.  EKG obtained which confirmed A-fib RVR.  RN contacted cardiology on-call patient was given her Cardizem 30 mg tablet if no effect in 20 minutes cardiology recommended restarting the Cardizem drip.  RN obtained orders from cardiology directly.  Chart reviewed EP cardiology and primary attending notes reviewed.  Appears patient was to be transition from Cardizem 30 mg tablets every 6 hours to Cardizem CD long-acting 120 mg dose ordered at 1315 does not appear to be given as per MAR review.  Patient was given Cardizem 30 mg x 2 doses throughout the evening.  -Continue with cardiology recommendations  -EKG as needed for rhythm changes  -Patient was transition from heparin drip to Eliquis      Chart reviewed.

## 2024-01-10 NOTE — RT PROTOCOL NOTE
RT Nebulizer Bronchodilator Protocol Note    There is a bronchodilator order in the chart from a provider indicating to follow the RT Bronchodilator Protocol and there is an “Initiate RT Bronchodilator Protocol” order as well (see protocol at bottom of note).    CXR Findings:  XR CHEST PORTABLE    Result Date: 1/10/2024  Limited by positioning and low inspiratory volumes. Grossly unchanged from 1/8/2024. Consider dedicated PA and lateral view of the chest when patient is able      The findings from the last RT Protocol Assessment were as follows:  Smoking: None or smoker <15 pack years  Respiratory Pattern: Regular pattern and RR 12-20 bpm  Breath Sounds: Slightly diminished and/or crackles  Cough: Strong, spontaneous, non-productive  Indication for Bronchodilator Therapy: None  Bronchodilator Assessment Score: 2    Aerosolized bronchodilator medication orders have been revised according to the RT Nebulizer Bronchodilator Protocol below.    Respiratory Therapist to perform RT Therapy Protocol Assessment initially then follow the protocol.  Repeat RT Therapy Protocol Assessment PRN for score 0-3 or on second treatment, BID, and PRN for scores above 3.    No Indications - adjust the frequency to every 6 hours PRN wheezing or bronchospasm, if no treatments needed after 48 hours then discontinue using Per Protocol order mode.     If indication present, adjust the RT bronchodilator orders based on the Bronchodilator Assessment Score as indicated below.  If a patient is on this medication at home then do not decrease Frequency below that used at home.    0-3 - enter or revise RT bronchodilator order(s) to equivalent RT Bronchodilator order with Frequency of every 4 hours PRN for wheezing or increased work of breathing using Per Protocol order mode.       4-6 - enter or revise RT Bronchodilator order(s) to two equivalent RT bronchodilator orders with one order with BID Frequency and one order with Frequency of every 4

## 2024-01-10 NOTE — PROGRESS NOTES
RN notified by unit HUC of pt's HR in the 150s. Upon assessment the pt's telemetry was reading a-fib RVR. Stat EKG ordered. Notified Sandra Meek NP and cardiology. 30 mg Cardizem given at 2248 per order. Per cardiology MD, wait 20 min to see if pt converts to NSR. RN notified cardiology MD 20 min later of pt's HR ranging from 90 to 140s and still in afib .Cardiology MD stated to wait an additional 30 min. RN notified cardiology 30 min later with unchanged pt status. Cardizem gtt ordered and started at 0100 5mg/hr.

## 2024-01-10 NOTE — PLAN OF CARE
Problem: Cardiovascular - Adult  Goal: Absence of cardiac dysrhythmias or at baseline  Outcome: Not Progressing  Absence of cardiac dysrhythmias or at baseline: Monitor cardiac rate and rhythm  Note: Pt restarted on Caredizem gtt     Problem: ABCDS Injury Assessment  Goal: Absence of physical injury  Outcome: Progressing  Absence of Physical Injury: Implement safety measures based on patient assessment     Problem: Safety - Adult  Goal: Free from fall injury  Outcome: Progressing  Free From Fall Injury: Instruct family/caregiver on patient safety

## 2024-01-10 NOTE — PROGRESS NOTES
sodium chloride flush  5-40 mL IntraVENous 2 times per day      PRN Medications: guaiFENesin-dextromethorphan, albuterol, sodium chloride flush, sodium chloride, ondansetron **OR** ondansetron, polyethylene glycol, acetaminophen **OR** acetaminophen, perflutren lipid microspheres  Diet: ADULT DIET; Regular; Low Sodium (2 gm)    Continuous Infusions:   dilTIAZem 125 mg in sodium chloride 0.9 % 125 mL infusion 15 mg/hr (01/10/24 0506)    sodium chloride         PHYSICAL EXAM:  BP (!) 155/84   Pulse (!) 121   Temp 98.2 °F (36.8 °C) (Oral)   Resp 21   Ht 1.651 m (5' 5\")   Wt 115.3 kg (254 lb 4.8 oz)   SpO2 92%   BMI 42.32 kg/m²   No results for input(s): \"POCGLU\" in the last 72 hours.    Intake/Output Summary (Last 24 hours) at 1/10/2024 0915  Last data filed at 1/9/2024 1636  Gross per 24 hour   Intake 240 ml   Output 1000 ml   Net -760 ml       General: NAD  Eyes: EOMI  ENT: neck supple  Cardiovascular: No murmurs.  Respiratory: Diminished breath sounds with diffuse rhonchi, wheeze  Gastrointestinal: Soft, non tender  Genitourinary: no suprapubic tenderness  Musculoskeletal: non-pitting edema  Skin: warm, dry  Neuro: Alert.  Psych: Mood appropriate.     LABS:  Recent Labs     01/08/24 0220 01/08/24  2031   WBC 5.8 8.2   HGB 13.1 12.7   HCT 40.3 37.9    174                                                                      Recent Labs     01/08/24  1101 01/09/24  0417 01/09/24  2315 01/10/24  0439   * 133*  --  134*   K 3.8 3.6 4.0 3.9   CL 97* 97*  --  98*   CO2 24 27  --  25   BUN 7 12  --  9   CREATININE <0.5* 0.6  --  0.5*   GLUCOSE 150* 120*  --  108*   PHOS 2.9 3.1  --  3.0       Recent Labs     01/08/24  0220   AST 25   ALT 21   BILITOT 0.4   ALKPHOS 92       No results for input(s): \"TROPONINI\" in the last 72 hours.  No results for input(s): \"BNP\" in the last 72 hours.  No results for input(s): \"CHOL\", \"HDL\" in the last 72 hours.    Invalid input(s): \"LDLCALCU\"  Recent Labs      bronchodilators: scheduled and PRN  - Trial of steroids  - Antitussives  - Trial flonase    - Consider Pulm eval    - O/p Sleep study and F/u with Pulm HTN specialist.     The patient and / or the family were informed of the results of any tests, a time was given to answer questions, a plan was proposed and they agreed with plan.    Full Code    Disposition: Possible discharge in about 2 days.   To home      Ousmane Villegas MD

## 2024-01-10 NOTE — RT PROTOCOL NOTE
RT Nebulizer Bronchodilator Protocol Note    There is a bronchodilator order in the chart from a provider indicating to follow the RT Bronchodilator Protocol and there is an “Initiate RT Bronchodilator Protocol” order as well (see protocol at bottom of note).    CXR Findings:  XR CHEST PORTABLE    Result Date: 1/10/2024  Limited by positioning and low inspiratory volumes. Grossly unchanged from 1/8/2024. Consider dedicated PA and lateral view of the chest when patient is able      The findings from the last RT Protocol Assessment were as follows:  Smoking: None or smoker <15 pack years  Respiratory Pattern: Regular pattern and RR 12-20 bpm  Breath Sounds: Intermittent or unilateral wheezes  Cough: Strong, spontaneous, non-productive  Indication for Bronchodilator Therapy: None  Bronchodilator Assessment Score: 4    Aerosolized bronchodilator medication orders have been revised according to the RT Nebulizer Bronchodilator Protocol below.    Respiratory Therapist to perform RT Therapy Protocol Assessment initially then follow the protocol.  Repeat RT Therapy Protocol Assessment PRN for score 0-3 or on second treatment, BID, and PRN for scores above 3.    No Indications - adjust the frequency to every 6 hours PRN wheezing or bronchospasm, if no treatments needed after 48 hours then discontinue using Per Protocol order mode.     If indication present, adjust the RT bronchodilator orders based on the Bronchodilator Assessment Score as indicated below.  If a patient is on this medication at home then do not decrease Frequency below that used at home.    0-3 - enter or revise RT bronchodilator order(s) to equivalent RT Bronchodilator order with Frequency of every 4 hours PRN for wheezing or increased work of breathing using Per Protocol order mode.       4-6 - enter or revise RT Bronchodilator order(s) to two equivalent RT bronchodilator orders with one order with BID Frequency and one order with Frequency of every 4 hours

## 2024-01-10 NOTE — PROGRESS NOTES
Pt Cardizem gtt is at its max 15mg/hr. Pt HR is still ranging from . MD cardiology notified.MD stated to continue with Cardizem gtt parameters and they will assess during rounds in AM.

## 2024-01-11 LAB
ALBUMIN SERPL-MCNC: 3.6 G/DL (ref 3.4–5)
ANION GAP SERPL CALCULATED.3IONS-SCNC: 8 MMOL/L (ref 3–16)
BUN SERPL-MCNC: 11 MG/DL (ref 7–20)
CALCIUM SERPL-MCNC: 9.6 MG/DL (ref 8.3–10.6)
CHLORIDE SERPL-SCNC: 99 MMOL/L (ref 99–110)
CO2 SERPL-SCNC: 27 MMOL/L (ref 21–32)
CREAT SERPL-MCNC: 0.5 MG/DL (ref 0.6–1.2)
EKG ATRIAL RATE: 293 BPM
EKG DIAGNOSIS: NORMAL
EKG P AXIS: 200 DEGREES
EKG Q-T INTERVAL: 266 MS
EKG QRS DURATION: 100 MS
EKG QTC CALCULATION (BAZETT): 385 MS
EKG R AXIS: -55 DEGREES
EKG T AXIS: 104 DEGREES
EKG VENTRICULAR RATE: 126 BPM
GFR SERPLBLD CREATININE-BSD FMLA CKD-EPI: >60 ML/MIN/{1.73_M2}
GLUCOSE SERPL-MCNC: 148 MG/DL (ref 70–99)
MAGNESIUM SERPL-MCNC: 2.2 MG/DL (ref 1.8–2.4)
PHOSPHATE SERPL-MCNC: 3.1 MG/DL (ref 2.5–4.9)
POTASSIUM SERPL-SCNC: 5.1 MMOL/L (ref 3.5–5.1)
SODIUM SERPL-SCNC: 134 MMOL/L (ref 136–145)

## 2024-01-11 PROCEDURE — 2580000003 HC RX 258: Performed by: HOSPITALIST

## 2024-01-11 PROCEDURE — 2500000003 HC RX 250 WO HCPCS: Performed by: NURSE PRACTITIONER

## 2024-01-11 PROCEDURE — 80069 RENAL FUNCTION PANEL: CPT

## 2024-01-11 PROCEDURE — 36415 COLL VENOUS BLD VENIPUNCTURE: CPT

## 2024-01-11 PROCEDURE — 6370000000 HC RX 637 (ALT 250 FOR IP): Performed by: INTERNAL MEDICINE

## 2024-01-11 PROCEDURE — 94640 AIRWAY INHALATION TREATMENT: CPT

## 2024-01-11 PROCEDURE — 83735 ASSAY OF MAGNESIUM: CPT

## 2024-01-11 PROCEDURE — 2580000003 HC RX 258: Performed by: NURSE PRACTITIONER

## 2024-01-11 PROCEDURE — 93010 ELECTROCARDIOGRAM REPORT: CPT | Performed by: INTERNAL MEDICINE

## 2024-01-11 PROCEDURE — 99233 SBSQ HOSP IP/OBS HIGH 50: CPT | Performed by: NURSE PRACTITIONER

## 2024-01-11 PROCEDURE — 6370000000 HC RX 637 (ALT 250 FOR IP): Performed by: NURSE PRACTITIONER

## 2024-01-11 PROCEDURE — 94761 N-INVAS EAR/PLS OXIMETRY MLT: CPT

## 2024-01-11 PROCEDURE — 6370000000 HC RX 637 (ALT 250 FOR IP): Performed by: HOSPITALIST

## 2024-01-11 PROCEDURE — 2060000000 HC ICU INTERMEDIATE R&B

## 2024-01-11 RX ADMIN — APIXABAN 5 MG: 5 TABLET, FILM COATED ORAL at 20:31

## 2024-01-11 RX ADMIN — APIXABAN 5 MG: 5 TABLET, FILM COATED ORAL at 08:52

## 2024-01-11 RX ADMIN — BENZONATATE 200 MG: 100 CAPSULE ORAL at 20:22

## 2024-01-11 RX ADMIN — DILTIAZEM HYDROCHLORIDE 10 MG/HR: 5 INJECTION, SOLUTION INTRAVENOUS at 19:45

## 2024-01-11 RX ADMIN — IPRATROPIUM BROMIDE AND ALBUTEROL SULFATE 1 DOSE: 2.5; .5 SOLUTION RESPIRATORY (INHALATION) at 20:45

## 2024-01-11 RX ADMIN — AMIODARONE HYDROCHLORIDE 200 MG: 200 TABLET ORAL at 08:52

## 2024-01-11 RX ADMIN — GUAIFENESIN SYRUP AND DEXTROMETHORPHAN 10 ML: 100; 10 SYRUP ORAL at 20:22

## 2024-01-11 RX ADMIN — SERTRALINE HYDROCHLORIDE 100 MG: 100 TABLET ORAL at 08:52

## 2024-01-11 RX ADMIN — BENZONATATE 200 MG: 100 CAPSULE ORAL at 18:18

## 2024-01-11 RX ADMIN — SODIUM CHLORIDE, PRESERVATIVE FREE 10 ML: 5 INJECTION INTRAVENOUS at 08:52

## 2024-01-11 RX ADMIN — GUAIFENESIN SYRUP AND DEXTROMETHORPHAN 10 ML: 100; 10 SYRUP ORAL at 11:38

## 2024-01-11 RX ADMIN — METOPROLOL TARTRATE 25 MG: 25 TABLET, FILM COATED ORAL at 20:22

## 2024-01-11 RX ADMIN — AMIODARONE HYDROCHLORIDE 200 MG: 200 TABLET ORAL at 20:22

## 2024-01-11 RX ADMIN — METOPROLOL TARTRATE 5 MG: 1 INJECTION, SOLUTION INTRAVENOUS at 10:50

## 2024-01-11 RX ADMIN — BENZONATATE 200 MG: 100 CAPSULE ORAL at 08:52

## 2024-01-11 RX ADMIN — IPRATROPIUM BROMIDE AND ALBUTEROL SULFATE 1 DOSE: 2.5; .5 SOLUTION RESPIRATORY (INHALATION) at 08:23

## 2024-01-11 RX ADMIN — FLUTICASONE PROPIONATE 1 SPRAY: 50 SPRAY, METERED NASAL at 08:55

## 2024-01-11 RX ADMIN — METOPROLOL TARTRATE 25 MG: 25 TABLET, FILM COATED ORAL at 11:42

## 2024-01-11 RX ADMIN — DILTIAZEM HYDROCHLORIDE 12.5 MG/HR: 5 INJECTION, SOLUTION INTRAVENOUS at 06:36

## 2024-01-11 RX ADMIN — PREDNISONE 40 MG: 20 TABLET ORAL at 08:52

## 2024-01-11 RX ADMIN — PANTOPRAZOLE SODIUM 40 MG: 40 TABLET, DELAYED RELEASE ORAL at 08:52

## 2024-01-11 NOTE — PROGRESS NOTES
Daily    sertraline  100 mg Oral Daily    sodium chloride flush  5-40 mL IntraVENous 2 times per day      PRN Medications: metoprolol, albuterol, guaiFENesin-dextromethorphan, sodium chloride flush, sodium chloride, ondansetron **OR** ondansetron, polyethylene glycol, acetaminophen **OR** acetaminophen, perflutren lipid microspheres  Diet: ADULT DIET; Regular; Low Sodium (2 gm)    Continuous Infusions:   dilTIAZem 125 mg in sodium chloride 0.9 % 125 mL infusion 10 mg/hr (01/11/24 0740)    sodium chloride         PHYSICAL EXAM:  BP (!) 155/63   Pulse (!) 109   Temp 97.5 °F (36.4 °C) (Oral)   Resp 16   Ht 1.651 m (5' 5\")   Wt 115.3 kg (254 lb 4.8 oz)   SpO2 92%   BMI 42.32 kg/m²   No results for input(s): \"POCGLU\" in the last 72 hours.    Intake/Output Summary (Last 24 hours) at 1/11/2024 0916  Last data filed at 1/11/2024 0830  Gross per 24 hour   Intake 480 ml   Output 1100 ml   Net -620 ml       General: NAD  Eyes: EOMI  ENT: neck supple  Cardiovascular: No murmurs.  Respiratory: Diminished breath sounds with diffuse rhonchi, wheeze  Gastrointestinal: Soft, non tender  Genitourinary: no suprapubic tenderness  Musculoskeletal: non-pitting edema  Skin: warm, dry  Neuro: Alert.  Psych: Mood appropriate.     LABS:  Recent Labs     01/08/24  2031   WBC 8.2   HGB 12.7   HCT 37.9                                                                         Recent Labs     01/09/24  0417 01/09/24  2315 01/10/24  0439 01/11/24  0450   *  --  134* 134*   K 3.6 4.0 3.9 5.1   CL 97*  --  98* 99   CO2 27  --  25 27   BUN 12  --  9 11   CREATININE 0.6  --  0.5* 0.5*   GLUCOSE 120*  --  108* 148*   PHOS 3.1  --  3.0 3.1       No results for input(s): \"AST\", \"ALT\", \"ALB\", \"BILITOT\", \"ALKPHOS\" in the last 72 hours.    No results for input(s): \"TROPONINI\" in the last 72 hours.  No results for input(s): \"BNP\" in the last 72 hours.  No results for input(s): \"CHOL\", \"HDL\" in the last 72 hours.    Invalid input(s):  panel which returned positive for RSV, which clinically will likely explain patient's symptoms, with features of bronchoconstriction on exam, likely secondary to bronchiolitis and reactive airway disease, and abnormal chest x-ray likely secondary to viral pneumonia  -Continue supportive care with bronchodilators  -Started steroids 1/10/2024  -Antitussives: scheduled and PRN    Severe Pulm HTN: POA  - Noted on echo; with Mod TR    Essential Hypertension    Type 2 diabetes mellitus: POA    Morbid obesity: POA   Body mass index is 42.32 kg/m². - Complicating assessment and treatment. Placing patient at risk for multiple co-morbidities as well as early death and contributing to the patient's presentation.  on weight loss                - Presented with respiratory symptoms of shortness of breath and wheeze as well as sinus congestion that were initially felt to be secondary to \"sinus infection\", treated with 2 courses of antibiotics, but persistent    Was noted to be in atrial fibrillation with rapid ventricular response; paroxysmal atrial fibrillation on presentation    Pxt's TSH is very low at 0.01 mcg, and free T4 high at 2.6, suggesting that hyperthyroidism may be playing a role in her atrial fibrillation at least at this time.  Synthroid has been held with results of thyroid study. .  Outpatient repeat thyroid studies to guide further Synthroid dosing    -Also was felt to possibly have features of acute heart failure, possibly responsible for some of all of her respiratory symptoms although may also have had reactive airway disease superimposed on features of fluid overload after she got fluids in the ED    Optimize cardiac risk factor management: She has hypertension, and diabetes mellitus; on Actos at home, which will likely need to be reevaluated especially in the setting of fluid overload.    Per Cardiology, F/u for possible outpatient ischemia evaluation with F/u with Cardiology closer to her home

## 2024-01-11 NOTE — PROGRESS NOTES
Pt HR was ranging from 115-150 at 2200. STAT EKG ordered and Cardiologist physician notified. Pt remains at 15 gtt Cardizem and PRN 5mg IV push of metoprolol given. Pt heart rate has remained between 70-85. POC continues.

## 2024-01-11 NOTE — PROGRESS NOTES
Electrophysiology - PROGRESS NOTE    Admit Date: 1/8/2024     Chief Complaint: New onset atrial fib     Interval History:   Patient seen and examined and notes reviewed. Patient is being followed for new onset atrial fib.  Patient had presented with worsening shortness of breath, cough and new onset AF.  She had started noticing symptoms at least 2 weeks prior initially with sinus congestion that progressed to shortness of breath and difficulty breathing.  She presented to an ED at an outside facility and was noted to be in extensive wheezing and there was a concern for pneumonia.  Initially she was placed on IV fluids that were discontinued later.  She was placed on a diltiazem drip and during her emergency room stay she converted to normal sinus rhythm.  She continued to have symptoms of shortness of breath, difficulty breathing and extensive wheezing.  She was transferred to [facility] ED.  She was noted to be back in atrial fibrillation and was reinitiated on a diltiazem drip.  During that ED visit she converted to normal sinus rhythm.  Her EKG showed sinus rhythm and T wave inversions in her precordial leads.  Her high-sensitivity troponin was mildly elevated however flat.  Her proBNP was 1100.  Her diltiazem drip was changed to 30 mg every 6 and yesterday she was changed 120 mg daily.  She did go into atrial fibrillation overnight and was placed back on a diltiazem drip yesterday.  She has not had a sleep study in the past.  She states she went out of rhythm during the night and had to be woken.  Her heart rates were not controlled.  She did have an echo that showed severe pulmonary hypertension, moderate TR. This was reviewed w./ Dr. Dejesus who we could manage outpatient as probably not a new finding. Patient had no SOB prior to her recent viral illness. Patient placed on amio 200 mg BID (1/10). Prn metoprolol added.  Patient appears to be organizing  into flutter.  Her heart rate appears to be fairly well-controlled.  She has received prn metoprolol.  She is now in isolation for RSV.  Family is at the bedside.  We had a long discussion regarding her outpatient care including a stress test, possible right heart cath and management of atrial fibrillation. HR elevates with coughing.       In: 600 [P.O.:600]  Out: 1400    Wt Readings from Last 2 Encounters:   01/10/24 115.3 kg (254 lb 4.8 oz)   01/08/24 115.4 kg (254 lb 6.6 oz)       Data:   Scheduled Meds:   Scheduled Meds:   amiodarone  200 mg Oral BID    predniSONE  40 mg Oral Daily    ipratropium 0.5 mg-albuterol 2.5 mg  1 Dose Inhalation BID RT    [Held by provider] dilTIAZem  120 mg Oral Daily    apixaban  5 mg Oral BID    fluticasone  1 spray Each Nostril Daily    benzonatate  200 mg Oral TID    pantoprazole  40 mg Oral Daily    sertraline  100 mg Oral Daily    sodium chloride flush  5-40 mL IntraVENous 2 times per day     Continuous Infusions:   dilTIAZem 125 mg in sodium chloride 0.9 % 125 mL infusion 10 mg/hr (01/11/24 0740)    sodium chloride       PRN Meds:.metoprolol, albuterol, guaiFENesin-dextromethorphan, sodium chloride flush, sodium chloride, ondansetron **OR** ondansetron, polyethylene glycol, acetaminophen **OR** acetaminophen, perflutren lipid microspheres  Continuous Infusions:   dilTIAZem 125 mg in sodium chloride 0.9 % 125 mL infusion 10 mg/hr (01/11/24 0740)    sodium chloride         Intake/Output Summary (Last 24 hours) at 1/11/2024 0814  Last data filed at 1/11/2024 0338  Gross per 24 hour   Intake 480 ml   Output 1100 ml   Net -620 ml       CBC:   Lab Results   Component Value Date/Time    WBC 8.2 01/08/2024 08:31 PM    HGB 12.7 01/08/2024 08:31 PM     01/08/2024 08:31 PM     BMP:  Lab Results   Component Value Date/Time     01/11/2024 04:50 AM    K 5.1 01/11/2024 04:50 AM    K 3.0 01/08/2024 02:20 AM    CL 99 01/11/2024 04:50 AM    CO2 27 01/11/2024 04:50 AM    BUN 11

## 2024-01-11 NOTE — PLAN OF CARE
Problem: Safety - Adult  Goal: Free from fall injury  Outcome: Progressing  Note: Patient's hemoglobin this AM:   Recent Labs     01/08/24 2031   HGB 12.7     Patient's platelet count this AM:   Recent Labs     01/08/24 2031       Thrombocytopenia not present at this time.  Patient showing no signs or symptoms of active bleeding.  Transfusion not indicated at this time.  Patient verbalizes understanding of all instructions. Will continue to assess and implement POC. Call light within reach and hourly rounding in place.       Problem: Cardiovascular - Adult  Goal: Absence of cardiac dysrhythmias or at baseline  Outcome: Progressing  Absence of cardiac dysrhythmias or at baseline: Monitor cardiac rate and rhythm  Note: Pt received PRN metoprolol during this shift. HR improved.      Problem: Metabolic/Fluid and Electrolytes - Adult  Goal: Electrolytes maintained within normal limits  Outcome: Progressing

## 2024-01-12 ENCOUNTER — APPOINTMENT (OUTPATIENT)
Dept: CARDIAC CATH/INVASIVE PROCEDURES | Age: 69
End: 2024-01-12
Attending: INTERNAL MEDICINE
Payer: MEDICARE

## 2024-01-12 ENCOUNTER — ANESTHESIA (OUTPATIENT)
Dept: CARDIAC CATH/INVASIVE PROCEDURES | Age: 69
End: 2024-01-12
Payer: MEDICARE

## 2024-01-12 ENCOUNTER — ANESTHESIA EVENT (OUTPATIENT)
Dept: CARDIAC CATH/INVASIVE PROCEDURES | Age: 69
End: 2024-01-12
Payer: MEDICARE

## 2024-01-12 VITALS
RESPIRATION RATE: 18 BRPM | SYSTOLIC BLOOD PRESSURE: 109 MMHG | WEIGHT: 245.59 LBS | HEIGHT: 65 IN | TEMPERATURE: 98.2 F | OXYGEN SATURATION: 97 % | DIASTOLIC BLOOD PRESSURE: 63 MMHG | HEART RATE: 58 BPM | BODY MASS INDEX: 40.92 KG/M2

## 2024-01-12 LAB
ALBUMIN SERPL-MCNC: 3.6 G/DL (ref 3.4–5)
ANION GAP SERPL CALCULATED.3IONS-SCNC: 12 MMOL/L (ref 3–16)
BACTERIA BLD CULT ORG #2: NORMAL
BACTERIA BLD CULT: NORMAL
BUN SERPL-MCNC: 12 MG/DL (ref 7–20)
CALCIUM SERPL-MCNC: 9.7 MG/DL (ref 8.3–10.6)
CHLORIDE SERPL-SCNC: 99 MMOL/L (ref 99–110)
CO2 SERPL-SCNC: 24 MMOL/L (ref 21–32)
CREAT SERPL-MCNC: 0.6 MG/DL (ref 0.6–1.2)
GFR SERPLBLD CREATININE-BSD FMLA CKD-EPI: >60 ML/MIN/{1.73_M2}
GLUCOSE SERPL-MCNC: 111 MG/DL (ref 70–99)
MAGNESIUM SERPL-MCNC: 2.2 MG/DL (ref 1.8–2.4)
PHOSPHATE SERPL-MCNC: 3.3 MG/DL (ref 2.5–4.9)
POTASSIUM SERPL-SCNC: 4.1 MMOL/L (ref 3.5–5.1)
SODIUM SERPL-SCNC: 135 MMOL/L (ref 136–145)

## 2024-01-12 PROCEDURE — 02583ZZ DESTRUCTION OF CONDUCTION MECHANISM, PERCUTANEOUS APPROACH: ICD-10-PCS | Performed by: INTERNAL MEDICINE

## 2024-01-12 PROCEDURE — C1766 INTRO/SHEATH,STRBLE,NON-PEEL: HCPCS | Performed by: INTERNAL MEDICINE

## 2024-01-12 PROCEDURE — 6370000000 HC RX 637 (ALT 250 FOR IP): Performed by: INTERNAL MEDICINE

## 2024-01-12 PROCEDURE — G0269 OCCLUSIVE DEVICE IN VEIN ART: HCPCS

## 2024-01-12 PROCEDURE — 4A0234Z MEASUREMENT OF CARDIAC ELECTRICAL ACTIVITY, PERCUTANEOUS APPROACH: ICD-10-PCS | Performed by: INTERNAL MEDICINE

## 2024-01-12 PROCEDURE — 93653 COMPRE EP EVAL TX SVT: CPT | Performed by: INTERNAL MEDICINE

## 2024-01-12 PROCEDURE — 2580000003 HC RX 258: Performed by: NURSE ANESTHETIST, CERTIFIED REGISTERED

## 2024-01-12 PROCEDURE — 99233 SBSQ HOSP IP/OBS HIGH 50: CPT | Performed by: INTERNAL MEDICINE

## 2024-01-12 PROCEDURE — 80069 RENAL FUNCTION PANEL: CPT

## 2024-01-12 PROCEDURE — 6370000000 HC RX 637 (ALT 250 FOR IP): Performed by: HOSPITALIST

## 2024-01-12 PROCEDURE — 3700000000 HC ANESTHESIA ATTENDED CARE: Performed by: ANESTHESIOLOGY

## 2024-01-12 PROCEDURE — 93662 INTRACARDIAC ECG (ICE): CPT | Performed by: INTERNAL MEDICINE

## 2024-01-12 PROCEDURE — 6370000000 HC RX 637 (ALT 250 FOR IP): Performed by: NURSE PRACTITIONER

## 2024-01-12 PROCEDURE — 2709999900 HC NON-CHARGEABLE SUPPLY: Performed by: INTERNAL MEDICINE

## 2024-01-12 PROCEDURE — 36415 COLL VENOUS BLD VENIPUNCTURE: CPT

## 2024-01-12 PROCEDURE — 2720000010 HC SURG SUPPLY STERILE: Performed by: INTERNAL MEDICINE

## 2024-01-12 PROCEDURE — C1760 CLOSURE DEV, VASC: HCPCS | Performed by: INTERNAL MEDICINE

## 2024-01-12 PROCEDURE — C1759 CATH, INTRA ECHOCARDIOGRAPHY: HCPCS | Performed by: INTERNAL MEDICINE

## 2024-01-12 PROCEDURE — 6360000002 HC RX W HCPCS

## 2024-01-12 PROCEDURE — 93622 COMP EP EVAL L VENTR PAC&REC: CPT

## 2024-01-12 PROCEDURE — 6360000002 HC RX W HCPCS: Performed by: NURSE ANESTHETIST, CERTIFIED REGISTERED

## 2024-01-12 PROCEDURE — 02K83ZZ MAP CONDUCTION MECHANISM, PERCUTANEOUS APPROACH: ICD-10-PCS | Performed by: INTERNAL MEDICINE

## 2024-01-12 PROCEDURE — 2500000003 HC RX 250 WO HCPCS

## 2024-01-12 PROCEDURE — 93653 COMPRE EP EVAL TX SVT: CPT

## 2024-01-12 PROCEDURE — 83735 ASSAY OF MAGNESIUM: CPT

## 2024-01-12 PROCEDURE — 3700000001 HC ADD 15 MINUTES (ANESTHESIA): Performed by: ANESTHESIOLOGY

## 2024-01-12 PROCEDURE — 93662 INTRACARDIAC ECG (ICE): CPT

## 2024-01-12 PROCEDURE — 4A023FZ MEASUREMENT OF CARDIAC RHYTHM, PERCUTANEOUS APPROACH: ICD-10-PCS | Performed by: INTERNAL MEDICINE

## 2024-01-12 PROCEDURE — C1732 CATH, EP, DIAG/ABL, 3D/VECT: HCPCS | Performed by: INTERNAL MEDICINE

## 2024-01-12 PROCEDURE — C1894 INTRO/SHEATH, NON-LASER: HCPCS | Performed by: INTERNAL MEDICINE

## 2024-01-12 RX ORDER — ACETAMINOPHEN 325 MG/1
650 TABLET ORAL
OUTPATIENT
Start: 2024-01-12 | End: 2024-01-13

## 2024-01-12 RX ORDER — FENTANYL CITRATE 50 UG/ML
INJECTION, SOLUTION INTRAMUSCULAR; INTRAVENOUS PRN
Status: DISCONTINUED | OUTPATIENT
Start: 2024-01-12 | End: 2024-01-12 | Stop reason: SDUPTHER

## 2024-01-12 RX ORDER — DILTIAZEM HYDROCHLORIDE 120 MG/1
120 CAPSULE, COATED, EXTENDED RELEASE ORAL DAILY
Qty: 30 CAPSULE | Refills: 3 | Status: SHIPPED | OUTPATIENT
Start: 2024-01-13

## 2024-01-12 RX ORDER — SODIUM CHLORIDE 0.9 % (FLUSH) 0.9 %
5-40 SYRINGE (ML) INJECTION EVERY 12 HOURS SCHEDULED
OUTPATIENT
Start: 2024-01-12

## 2024-01-12 RX ORDER — AMIODARONE HYDROCHLORIDE 200 MG/1
TABLET ORAL
Qty: 60 TABLET | Refills: 2 | Status: SHIPPED | OUTPATIENT
Start: 2024-01-12

## 2024-01-12 RX ORDER — SODIUM CHLORIDE 9 MG/ML
INJECTION, SOLUTION INTRAVENOUS PRN
OUTPATIENT
Start: 2024-01-12

## 2024-01-12 RX ORDER — LABETALOL HYDROCHLORIDE 5 MG/ML
10 INJECTION, SOLUTION INTRAVENOUS
OUTPATIENT
Start: 2024-01-12

## 2024-01-12 RX ORDER — LORAZEPAM 2 MG/ML
0.5 INJECTION INTRAMUSCULAR
OUTPATIENT
Start: 2024-01-12 | End: 2024-01-13

## 2024-01-12 RX ORDER — ALBUTEROL SULFATE 90 UG/1
2 AEROSOL, METERED RESPIRATORY (INHALATION) 4 TIMES DAILY PRN
Qty: 18 G | Refills: 0 | Status: SHIPPED | OUTPATIENT
Start: 2024-01-12

## 2024-01-12 RX ORDER — GUAIFENESIN/DEXTROMETHORPHAN 100-10MG/5
10 SYRUP ORAL EVERY 4 HOURS PRN
Qty: 120 ML | Refills: 0 | Status: SHIPPED | OUTPATIENT
Start: 2024-01-12 | End: 2024-01-22

## 2024-01-12 RX ORDER — HEPARIN SODIUM 1000 [USP'U]/ML
INJECTION, SOLUTION INTRAVENOUS; SUBCUTANEOUS PRN
Status: DISCONTINUED | OUTPATIENT
Start: 2024-01-12 | End: 2024-01-12 | Stop reason: SDUPTHER

## 2024-01-12 RX ORDER — PROCHLORPERAZINE EDISYLATE 5 MG/ML
5 INJECTION INTRAMUSCULAR; INTRAVENOUS
OUTPATIENT
Start: 2024-01-12 | End: 2024-01-13

## 2024-01-12 RX ORDER — BENZONATATE 200 MG/1
200 CAPSULE ORAL 3 TIMES DAILY
Qty: 1 CAPSULE | Refills: 0 | Status: SHIPPED | OUTPATIENT
Start: 2024-01-12 | End: 2024-01-13

## 2024-01-12 RX ORDER — SODIUM CHLORIDE 0.9 % (FLUSH) 0.9 %
5-40 SYRINGE (ML) INJECTION PRN
OUTPATIENT
Start: 2024-01-12

## 2024-01-12 RX ORDER — IPRATROPIUM BROMIDE AND ALBUTEROL SULFATE 2.5; .5 MG/3ML; MG/3ML
1 SOLUTION RESPIRATORY (INHALATION)
OUTPATIENT
Start: 2024-01-12 | End: 2024-01-13

## 2024-01-12 RX ORDER — PREDNISONE 20 MG/1
40 TABLET ORAL DAILY
Qty: 4 TABLET | Refills: 0 | Status: SHIPPED | OUTPATIENT
Start: 2024-01-13 | End: 2024-01-12

## 2024-01-12 RX ORDER — PROPOFOL 10 MG/ML
INJECTION, EMULSION INTRAVENOUS CONTINUOUS PRN
Status: DISCONTINUED | OUTPATIENT
Start: 2024-01-12 | End: 2024-01-12 | Stop reason: SDUPTHER

## 2024-01-12 RX ORDER — PROPOFOL 10 MG/ML
INJECTION, EMULSION INTRAVENOUS
Status: COMPLETED
Start: 2024-01-12 | End: 2024-01-12

## 2024-01-12 RX ORDER — MEPERIDINE HYDROCHLORIDE 25 MG/ML
12.5 INJECTION INTRAMUSCULAR; INTRAVENOUS; SUBCUTANEOUS EVERY 5 MIN PRN
OUTPATIENT
Start: 2024-01-12

## 2024-01-12 RX ORDER — DIPHENHYDRAMINE HYDROCHLORIDE 50 MG/ML
12.5 INJECTION INTRAMUSCULAR; INTRAVENOUS
OUTPATIENT
Start: 2024-01-12 | End: 2024-01-13

## 2024-01-12 RX ORDER — SODIUM CHLORIDE 9 MG/ML
INJECTION, SOLUTION INTRAVENOUS CONTINUOUS PRN
Status: DISCONTINUED | OUTPATIENT
Start: 2024-01-12 | End: 2024-01-12 | Stop reason: SDUPTHER

## 2024-01-12 RX ORDER — FENTANYL CITRATE 50 UG/ML
25 INJECTION, SOLUTION INTRAMUSCULAR; INTRAVENOUS EVERY 5 MIN PRN
OUTPATIENT
Start: 2024-01-12

## 2024-01-12 RX ORDER — HYDROMORPHONE HYDROCHLORIDE 1 MG/ML
0.5 INJECTION, SOLUTION INTRAMUSCULAR; INTRAVENOUS; SUBCUTANEOUS EVERY 5 MIN PRN
OUTPATIENT
Start: 2024-01-12

## 2024-01-12 RX ORDER — FLUTICASONE PROPIONATE 50 MCG
1 SPRAY, SUSPENSION (ML) NASAL DAILY
Qty: 16 G | Refills: 3
Start: 2024-01-13

## 2024-01-12 RX ORDER — PREDNISONE 20 MG/1
40 TABLET ORAL DAILY
Qty: 8 TABLET | Refills: 0 | Status: SHIPPED | OUTPATIENT
Start: 2024-01-13 | End: 2024-01-17

## 2024-01-12 RX ORDER — METOPROLOL SUCCINATE 25 MG/1
50 TABLET, EXTENDED RELEASE ORAL DAILY
Qty: 30 TABLET | Refills: 3 | Status: SHIPPED | OUTPATIENT
Start: 2024-01-12

## 2024-01-12 RX ORDER — ONDANSETRON 2 MG/ML
4 INJECTION INTRAMUSCULAR; INTRAVENOUS
OUTPATIENT
Start: 2024-01-12 | End: 2024-01-13

## 2024-01-12 RX ADMIN — SERTRALINE HYDROCHLORIDE 100 MG: 100 TABLET ORAL at 09:25

## 2024-01-12 RX ADMIN — FENTANYL CITRATE 50 MCG: 50 INJECTION, SOLUTION INTRAMUSCULAR; INTRAVENOUS at 10:48

## 2024-01-12 RX ADMIN — PREDNISONE 40 MG: 20 TABLET ORAL at 09:25

## 2024-01-12 RX ADMIN — PROPOFOL 50 MG: 10 INJECTION, EMULSION INTRAVENOUS at 10:42

## 2024-01-12 RX ADMIN — PHENYLEPHRINE HYDROCHLORIDE 100 MCG: 10 INJECTION, SOLUTION INTRAMUSCULAR; INTRAVENOUS; SUBCUTANEOUS at 10:59

## 2024-01-12 RX ADMIN — BENZONATATE 200 MG: 100 CAPSULE ORAL at 09:25

## 2024-01-12 RX ADMIN — GUAIFENESIN SYRUP AND DEXTROMETHORPHAN 10 ML: 100; 10 SYRUP ORAL at 12:50

## 2024-01-12 RX ADMIN — APIXABAN 5 MG: 5 TABLET, FILM COATED ORAL at 09:22

## 2024-01-12 RX ADMIN — METOPROLOL TARTRATE 25 MG: 25 TABLET, FILM COATED ORAL at 09:25

## 2024-01-12 RX ADMIN — FENTANYL CITRATE 25 MCG: 50 INJECTION, SOLUTION INTRAMUSCULAR; INTRAVENOUS at 11:19

## 2024-01-12 RX ADMIN — PHENYLEPHRINE HYDROCHLORIDE 100 MCG: 10 INJECTION, SOLUTION INTRAMUSCULAR; INTRAVENOUS; SUBCUTANEOUS at 11:07

## 2024-01-12 RX ADMIN — AMIODARONE HYDROCHLORIDE 200 MG: 200 TABLET ORAL at 09:25

## 2024-01-12 RX ADMIN — FLUTICASONE PROPIONATE 1 SPRAY: 50 SPRAY, METERED NASAL at 09:25

## 2024-01-12 RX ADMIN — PANTOPRAZOLE SODIUM 40 MG: 40 TABLET, DELAYED RELEASE ORAL at 09:25

## 2024-01-12 RX ADMIN — FENTANYL CITRATE 25 MCG: 50 INJECTION, SOLUTION INTRAMUSCULAR; INTRAVENOUS at 11:35

## 2024-01-12 RX ADMIN — PROPOFOL 100 MCG/KG/MIN: 10 INJECTION, EMULSION INTRAVENOUS at 10:39

## 2024-01-12 RX ADMIN — SODIUM CHLORIDE: 9 INJECTION, SOLUTION INTRAVENOUS at 10:35

## 2024-01-12 RX ADMIN — HEPARIN SODIUM 3000 UNITS: 1000 INJECTION INTRAVENOUS; SUBCUTANEOUS at 11:07

## 2024-01-12 RX ADMIN — PHENYLEPHRINE HYDROCHLORIDE 100 MCG: 10 INJECTION, SOLUTION INTRAMUSCULAR; INTRAVENOUS; SUBCUTANEOUS at 11:19

## 2024-01-12 RX ADMIN — PHENYLEPHRINE HYDROCHLORIDE 100 MCG: 10 INJECTION, SOLUTION INTRAMUSCULAR; INTRAVENOUS; SUBCUTANEOUS at 11:01

## 2024-01-12 RX ADMIN — PHENYLEPHRINE HYDROCHLORIDE 100 MCG: 10 INJECTION, SOLUTION INTRAMUSCULAR; INTRAVENOUS; SUBCUTANEOUS at 10:52

## 2024-01-12 RX ADMIN — GUAIFENESIN SYRUP AND DEXTROMETHORPHAN 10 ML: 100; 10 SYRUP ORAL at 17:11

## 2024-01-12 ASSESSMENT — ENCOUNTER SYMPTOMS: SHORTNESS OF BREATH: 1

## 2024-01-12 NOTE — PROGRESS NOTES
Cardizem drip stopped per MD order. HR 59 at this time. PIV flushed with saline and alcohol cap applied. Pt to go to cath lab for atrial flutter ablation. All questions answered. Plan of care ongoing.

## 2024-01-12 NOTE — PROGRESS NOTES
At 0330 hr pt hrt rate decreased to a non sustained 38 bpm and increased to a hrt rate in the 70's. Pt Cardizem drip was titrated from 10 to 7.5 ml/hr. At 0518 hr pt hrt rate decreased to a non sustained 57 bpm and increased back to the 70's. Pt Cardizem drip was decreased to 5 ml/hr. Charge nurse aware.Pt educated on why Cardizem drip was titrated.

## 2024-01-12 NOTE — PROGRESS NOTES
Discharge teaching and instructions for diagnosis of a fib completed with patient using teachback method. AVS reviewed. Medications, dosages, schedule, and potential side effects reviewed with patient. Patient voiced understanding regarding prescriptions, follow up appointments, and care of self at home. Discharged in a wheelchair to  home with support per family    Verified appropriate follow up appointments scheduled & pt instructed on how to schedule appts.  Diet & activity discussed.    Patient verbalized understanding and questions were answered to their satisfaction.  Signed discharge instructions were given to the patient and a copy placed in the paper-lite chart.      Sarah Middleton RN

## 2024-01-12 NOTE — PLAN OF CARE
Problem: Discharge Planning  Goal: Discharge to home or other facility with appropriate resources  1/12/2024 1545 by Sarah Middleton RN  Outcome: Completed  1/12/2024 1543 by Sarah Middleton RN  Outcome: Progressing     Problem: ABCDS Injury Assessment  Goal: Absence of physical injury  1/12/2024 1545 by Sarah Middleton, RN  Outcome: Completed  1/12/2024 1543 by Sarah Middleton RN  Outcome: Progressing  Note: Orthostatic vital signs obtained at start of shift - see flowsheet for details.  Pt does not meet criteria for orthostasis.  Pt is a High fall risk. See Sim Fall Score and ABCDS Injury Risk assessments.   + Screening for Orthostasis and/or + High Fall Risk per SIM/ABCDS: Explained fall risk precautions to pt and family and rationale behind their use to keep the patient safe. Pt bed is in low position, side rails up, call light and belongings are in reach. Fall wristband applied and present on pts wrist.  Bed alarm on.  Pt encouraged to call for assistance. Will continue with hourly rounds for PO intake, pain needs, toileting and repositioning as needed.         1/12/2024 0811 by Sally Romero RN  Outcome: Progressing  Flowsheets (Taken 1/9/2024 0554 by Jasmina Leonard RN)  Absence of Physical Injury: Implement safety measures based on patient assessment     Problem: Safety - Adult  Goal: Free from fall injury  1/12/2024 1545 by Sarah Middleton RN  Outcome: Completed  1/12/2024 1543 by Sarah Middleton RN  Outcome: Progressing  Note: Pt in bed with bed alarm on, instructed to call for assistance. Verbalized understanding. All fall precautions in place.     1/12/2024 0811 by Sally Romero RN  Outcome: Progressing  Flowsheets (Taken 1/12/2024 0811)  Free From Fall Injury:   Instruct family/caregiver on patient safety   Based on caregiver fall risk screen, instruct family/caregiver to ask for assistance with transferring infant if caregiver noted to have fall risk factors     Problem: Chronic

## 2024-01-12 NOTE — DISCHARGE SUMMARY
Hospital Discharge Summary    Patient's PCP: Arias Michaud  Admit Date: 1/8/2024   Discharge Date: 1/12/2024    Admitting Physician: Dr. Chris Hickman MD  Discharge Physician: Dr. Ousmane Villegas MD   Consults: cardiology    HPI: 68 y.o. female with pmh of HTN, hypothyroidism,  morbid obesity, BMI 42 and DMII who presented as a transfer rom [facility] with SOB.     Her symptoms started 2 weeks prior to presentation. It started as sinus congestion for which she was given 10 days of amoxicillin and she actually improved. She started getting worse again and was started on a Zpack and medrol dose back but she cont to have SOB, LAYNE and wheezing. She denies fever. She is unable to lay down or sleep and gets out of breath with walking. No chest pain or palpitations. She continued to worsen so she presented the ED where she was found in Afib with RVR.      There was concern initialy for sepsis 2/2 pneumonia sec to right lung crackles on auscultation. and she was started on IV fluids but she started to get hypoxic and bilateral crackles so fluids were stopped and she was given lasix and was started on Cardizem drip . She converted to NSR and was transferred to the [facility]     In the ED she was back to Afib with RVR and cardizem drip was continued. She later converted to NSR with HR in 60s.  CTPA-negative for PE.  Groundglass opacities bilaterally with elevated hemidiaphragm and associated atelectasis.  COVID and influenza-negative; electrolyte abnormalities-hypokalemia hyponatremia, hypokalemia and elevated proBNP and high-sensitivity troponin.    Brief hospital course:  Given the concern of the patients presentation and the concern of the possible multi-factorial etiology contributing to patients symptomatology. Patient was admitted and evaluated and found to have:       Discharge Diagnoses:       Paroxysmal atrial fibrillation, atrial flutter with secondary hypercoagulable state secondary to

## 2024-01-12 NOTE — ANESTHESIA PRE PROCEDURE
Department of Anesthesiology  Preprocedure Note       Name:  Flori Bartholomew   Age:  68 y.o.  :  1955                                          MRN:  [id number]         Date:  2024      Surgeon: * No surgeons listed *    Procedure: * No procedures listed *    Medications prior to admission:   Prior to Admission medications    Medication Sig Start Date End Date Taking? Authorizing Provider   levothyroxine (SYNTHROID) 50 MCG tablet Take 1 tablet by mouth Daily Take with 200 mcg tablet to make 250 mcg every day.   Yes Chari Camejo MD   levothyroxine (SYNTHROID) 200 MCG tablet Take 1 tablet by mouth Daily Take with 50 mcg tablet to make 250 mcg every day.   Yes Chari Camejo MD   meloxicam (MOBIC) 15 MG tablet Take 1 tablet by mouth daily   Yes Chari Camejo MD   hydroCHLOROthiazide (HYDRODIURIL) 25 MG tablet Take 1 tablet by mouth daily    Chari Camejo MD   pioglitazone (ACTOS) 30 MG tablet Take 1 tablet by mouth daily    Chari Camejo MD   pantoprazole (PROTONIX) 40 MG tablet Take 1 tablet by mouth daily    Chari Camejo MD   metoprolol succinate (TOPROL XL) 25 MG extended release tablet Take 1 tablet by mouth daily    Chari Camejo MD   sertraline (ZOLOFT) 100 MG tablet Take 1 tablet by mouth daily    ProviderChari MD       Current medications:    Current Facility-Administered Medications   Medication Dose Route Frequency Provider Last Rate Last Admin   • metoprolol tartrate (LOPRESSOR) tablet 25 mg  25 mg Oral BID Marilee Antonio APRN - CNP   25 mg at 24   • dilTIAZem 125 mg in sodium chloride 0.9 % 125 mL infusion  2.5-15 mg/hr IntraVENous Continuous Alaina Castellanos APRN - CNP 5 mL/hr at 24 0531 5 mg/hr at 24 0531   • metoprolol (LOPRESSOR) injection 5 mg  5 mg IntraVENous Q6H PRN Marilee Antonio APRN - CNP   5 mg at 24 1050   • amiodarone (CORDARONE) tablet 200 mg  200 mg Oral BID Marilee Antonio APRN - CNP

## 2024-01-12 NOTE — ANESTHESIA POSTPROCEDURE EVALUATION
Department of Anesthesiology  Postprocedure Note    Patient: Flori Bartholomew  MRN: [id number]  YOB: 1955  Date of evaluation: 1/12/2024    Procedure Summary       Date: 01/12/24 Room / Location: [facility] Cath Lab    Anesthesia Start: 1030 Anesthesia Stop: 1147    Procedures:       [facility] A-FLUTTER ABL W ANES      Procedure Not Yet Scheduled Diagnosis:     Scheduled Providers:  Responsible Provider: Eduardo Lopez MD    Anesthesia Type: MAC ASA Status: 3            Anesthesia Type: No value filed.    Ingrid Phase I:      Ingrid Phase II:      Anesthesia Post Evaluation    Patient location during evaluation: PACU  Patient participation: complete - patient participated  Level of consciousness: awake  Airway patency: patent  Nausea & Vomiting: no nausea and no vomiting  Cardiovascular status: blood pressure returned to baseline and hemodynamically stable  Respiratory status: acceptable  Hydration status: euvolemic  Multimodal analgesia pain management approach  Pain management: adequate    No notable events documented.

## 2024-01-12 NOTE — PROGRESS NOTES
[facility]   Cardiac Electrophysiology Progress Note     Admit Date: 2024     Reason for follow up: Atrial fibrillation/atrial flutter    HPI and Interval History:   Patient seen and examined. Clinical notes reviewed.   Telemetry reviewed. No new complaint today.   No major events overnight.   Denies having chest pain, shortness of breath, dyspnea on exertion, Orthopnea, PND at the time of this visit.    Review of System:  All other systems reviewed except for that noted above. Pertinent negatives and positives are:     General: negative for fever, chills   Ophthalmic ROS: negative for - eye pain or loss of vision  ENT ROS: negative for - headaches, sore throat   Respiratory: negative for - cough, sputum  Cardiovascular: Reviewed in HPI  Gastrointestinal: negative for - abdominal pain, diarrhea, N/V  Hematology: negative for - bleeding, blood clots, bruising or jaundice  Genito-Urinary:  negative for - Dysuria or incontinence  Musculoskeletal: negative for - Joint swelling, muscle pain  Neurological: negative for - confusion, dizziness, headaches   Psychiatric: No anxiety, no depression.  Dermatological: negative for - rash      Physical Examination:  Vitals:    24 0924   BP: 127/67   Pulse: 61   Resp: 17   Temp: 98.1 °F (36.7 °C)   SpO2: 95%        Intake/Output Summary (Last 24 hours) at 2024 0954  Last data filed at 2024 0814  Gross per 24 hour   Intake 836 ml   Output 1250 ml   Net -414 ml     In: 836 [P.O.:600; I.V.:236]  Out: 1250    Wt Readings from Last 3 Encounters:   24 111.4 kg (245 lb 9.5 oz)   24 115.4 kg (254 lb 6.6 oz)   22 119.4 kg (263 lb 3.7 oz)     Temp  Av.9 °F (36.6 °C)  Min: 97.7 °F (36.5 °C)  Max: 98.1 °F (36.7 °C)  Pulse  Av.9  Min: 61  Max: 95  BP  Min: 107/64  Max: 141/64  SpO2  Av.4 %  Min: 93 %  Max: 96 %    Telemetry: Sinus rhythm   Constitutional: Alert. Oriented to person, place, and time. No distress.   Head:  Normocephalic and atraumatic.   Mouth/Throat: Lips appear moist. Oropharynx is clear and moist.  Eyes: Conjunctivae normal. EOM are normal.   Neck: Neck supple. No lymphadenopathy. No rigidity. No JVD present.   Cardiovascular: Normal rate, regular rhythm. Normal S1&S2. Carotid pulse 2+ bilaterally.    Pulmonary/Chest: Bilateral respiratory sounds present. No respiratory accessory muscle use.  No wheezes, No rhonchi.    Abdominal: Soft. Normal bowel sounds present. No distension, No tenderness. No splenomegaly. No hernia.  Musculoskeletal: No tenderness. No edema    Lymphadenopathy: Has no cervical adenopathy.   Neurological: Alert and oriented. Cranial nerve II-XII grossly intact, No gross deficit to touch.  Skin: Skin is warm and dry. No rash, lesions, ulcerations noted.  Psychiatric: No anxiety nor agitation.      Labs, diagnostic and imaging results reviewed.   Reviewed.   Recent Labs     01/10/24  0439 01/11/24  0450 01/12/24  0439   * 134* 135*   K 3.9 5.1 4.1   CL 98* 99 99   CO2 25 27 24   PHOS 3.0 3.1 3.3   BUN 9 11 12   CREATININE 0.5* 0.5* 0.6     No results for input(s): \"WBC\", \"HGB\", \"HCT\", \"MCV\", \"PLT\" in the last 72 hours.  No results found for: \"CKTOTAL\", \"CKMB\", \"CKMBINDEX\", \"TROPONINI\"  Estimated Creatinine Clearance: 110 mL/min (based on SCr of 0.6 mg/dL).   No results found for: \"BNP\"  Lab Results   Component Value Date/Time    PROTIME 12.9 01/09/2024 04:17 AM    PROTIME 12.8 01/08/2024 08:31 PM    PROTIME 13.3 01/08/2024 11:02 AM    INR 0.97 01/09/2024 04:17 AM    INR 0.96 01/08/2024 08:31 PM    INR 1.01 01/08/2024 11:02 AM     No results found for: \"CHOL\", \"HDL\", \"TRIG\"    Scheduled Meds:   metoprolol tartrate  25 mg Oral BID    amiodarone  200 mg Oral BID    predniSONE  40 mg Oral Daily    ipratropium 0.5 mg-albuterol 2.5 mg  1 Dose Inhalation BID RT    [Held by provider] dilTIAZem  120 mg Oral Daily    apixaban  5 mg Oral BID    fluticasone  1 spray Each Nostril Daily    benzonatate

## 2024-01-12 NOTE — PROCEDURES
[facility]     Electrophysiology Procedure Note       Date of Procedure: 1/12/2024  Patient's Name: Flori Bartholomew  YOB: 1955   Medical Record Number: [id number]  Referring Physician: Arias Michaud   Procedure Performed by: Miguelina Goodwin MD    Procedure performed:  Comprehensive electrophysiological study  Three-dimensional electroanatomic mapping of the right atrium  Left atrial recording and mapping via coronary sinus   Monitored anesthesia care  Radiofrequency ablation of SVT, Cavotricuspid isthmus dependent right atrial flutter  Estimated blood loss less than 20 cc      Indications for procedure: Right atrial flutter   Flori Bartholomew 68 y.o. female with PMH of right atrial flutter. Due to persistent right atrial flutter, the  decision was made to proceed with EPS and ablation of atrial flutter.  Initially, she presented to the hospital with newly diagnosed atrial fibrillation with poorly controlled ventricular rate.  She was initiated on amiodarone and she organized into atrial flutter with rapid ventricular rate during which she was symptomatic.  Hence, she was recommended to have right atrial flutter ablation.  She is tested positive for RSV and hence, we decided not to do SONALI and proceed with intracardiac echocardiogram to evaluate her 30 atrial and right atrial appendage clot.    Details of procedure:  The risks, benefits and alternatives of the ablation procedure were discussed with the patient. The risks including, but not limited to, the risks of bleeding, infection, radiation exposure, injury to vascular, cardiac and surrounding structures (including pneumothorax), stroke, cardiac perforation, tamponade, need for emergent open heart surgery, need for pacemaker implantation, myocardial infarction and death were discussed in detail. The patient opted to proceed with the ablation. Written informed consent was signed and placed in the chart. The patient was brought

## 2024-01-12 NOTE — PLAN OF CARE
Problem: ABCDS Injury Assessment  Goal: Absence of physical injury  1/12/2024 1543 by Sarah Middleton RN  Outcome: Progressing  Note: Orthostatic vital signs obtained at start of shift - see flowsheet for details.  Pt does not meet criteria for orthostasis.  Pt is a High fall risk. See Wilkes Fall Score and ABCDS Injury Risk assessments.   + Screening for Orthostasis and/or + High Fall Risk per WILKES/ABCDS: Explained fall risk precautions to pt and family and rationale behind their use to keep the patient safe. Pt bed is in low position, side rails up, call light and belongings are in reach. Fall wristband applied and present on pts wrist.  Bed alarm on.  Pt encouraged to call for assistance. Will continue with hourly rounds for PO intake, pain needs, toileting and repositioning as needed.            Problem: Safety - Adult  Goal: Free from fall injury  1/12/2024 1543 by Sarah Middleton, RN  Outcome: Progressing  Note: Pt in bed with bed alarm on, instructed to call for assistance. Verbalized understanding. All fall precautions in place.        Problem: Respiratory - Adult  Goal: Achieves optimal ventilation and oxygenation  1/12/2024 1543 by Sarah Middleton, RN  Outcome: Progressing  Note: Pt O2 sats remain stable on room air. No evidence of respiratory distress.

## 2024-01-12 NOTE — PLAN OF CARE
Problem: ABCDS Injury Assessment  Goal: Absence of physical injury  Outcome: Progressing  Problem: Safety - Adult  Goal: Free from fall injury  Outcome: Progressing  Flowsheets (Taken 1/12/2024 0811)  Free From Fall Injury:   Instruct family/caregiver on patient safety   Based on caregiver fall risk screen, instruct family/caregiver to ask for assistance with transferring infant if caregiver noted to have fall risk factors     Problem: Respiratory - Adult  Goal: Achieves optimal ventilation and oxygenation  Outcome: Progressing  Flowsheets (Taken 1/12/2024 0811)  Achieves optimal ventilation and oxygenation:   Assess for changes in respiratory status   Assess for changes in mentation and behavior   Position to facilitate oxygenation and minimize respiratory effort   Assess and instruct to report shortness of breath or any respiratory difficulty   Encourage broncho-pulmonary hygiene including cough, deep breathe, incentive spirometry     Problem: Metabolic/Fluid and Electrolytes - Adult  Goal: Electrolytes maintained within normal limits  Outcome: Progressing  Flowsheets (Taken 1/12/2024 0811)  Electrolytes maintained within normal limits:   Monitor labs and assess patient for signs and symptoms of electrolyte imbalances   Administer electrolyte replacement as ordered   Monitor response to electrolyte replacements, including repeat lab results as appropriate   Fluid restriction as ordered   Instruct patient on fluid and nutrition restrictions as appropriate     Absence of Physical Injury: Implement safety measures based on patient assessment

## 2024-01-30 ENCOUNTER — OFFICE VISIT (OUTPATIENT)
Dept: CARDIOLOGY CLINIC | Age: 69
End: 2024-01-30

## 2024-01-30 VITALS
HEART RATE: 55 BPM | DIASTOLIC BLOOD PRESSURE: 84 MMHG | SYSTOLIC BLOOD PRESSURE: 136 MMHG | BODY MASS INDEX: 43.43 KG/M2 | WEIGHT: 261 LBS

## 2024-01-30 DIAGNOSIS — Z79.899 ENCOUNTER FOR MONITORING AMIODARONE THERAPY: ICD-10-CM

## 2024-01-30 DIAGNOSIS — I10 BENIGN ESSENTIAL HTN: ICD-10-CM

## 2024-01-30 DIAGNOSIS — Z51.81 ENCOUNTER FOR MONITORING AMIODARONE THERAPY: ICD-10-CM

## 2024-01-30 DIAGNOSIS — I48.3 TYPICAL ATRIAL FLUTTER (HCC): ICD-10-CM

## 2024-01-30 DIAGNOSIS — Z79.01 ON CONTINUOUS ORAL ANTICOAGULATION: ICD-10-CM

## 2024-01-30 DIAGNOSIS — E03.9 HYPOTHYROIDISM, UNSPECIFIED TYPE: ICD-10-CM

## 2024-01-30 DIAGNOSIS — I48.0 PAROXYSMAL ATRIAL FIBRILLATION (HCC): Primary | ICD-10-CM

## 2024-01-30 RX ORDER — LEVOTHYROXINE SODIUM 0.15 MG/1
150 TABLET ORAL DAILY
COMMUNITY

## 2024-01-30 NOTE — PROGRESS NOTES
[facility]   Electrophysiology  Office Visit  Date: 1/30/2024    Chief Complaint   Patient presents with    Hypertension    Atrial Fibrillation    Atrial Flutter    Medication Check       Cardiac HX: Flori Bartholomew is a 68 y.o. woman with a h/o HTN, DM, GERD, depression, hypothyroidism, morbid obesity, p/w SOB, cough noted to be in AF/RVR, placed on dilt gtt, converted to NSR, recurrent AF, dilt gtt restarted, switched to PO, Echo (1/9/2024) EF 55-60%, mod TR, severe pulm HTN, LAE 4.1/65.4, AF organized into AFL, converted NSR, s/p RFA of tAFL (1/12/2024), 2 week CAM (1/2024) showed predominant rhythm SB, 11% AF burden, 9.9% AFL burden, 189 AT episodes longest lasting 14 beats, 1 pause 2.7 seconds, biphasic p-waves, PAC 0.6%.    Interval History/HPI: Patient is here for follow up for pAF, tAFL, and atAFL. Patient presented to the hospital January 2024 for shortness of breath and cough. She was found to have new onset of AF/RVR when she presented to the ED. She had started noticing symptoms at least 2 weeks prior initially with sinus congestion that progressed to shortness of breath and difficulty breathing. She had extensive wheezing and there was a concern for pneumonia. She was placed on a diltiazem drip and she converted to NSR. Her EKG showed NSR and T wave inversions in her precordial leads. Her high- sensitivity troponin was mildly elevated however flat. Her pro-BNP was 1100. She was transitioned to oral diltiazem. She had recurrent atrial fibrillation and was placed back on the diltiazem drip. The recurrent atrial fibrillation was in the night hours. She has not had a sleep study in the past. Echo (1/9/2024) showed EF 55-60%, moderate tricuspid regurgitation, severe pulmonary hypertension and left atrium enlargement (4.1/65.4). Patient was started on amiodarone on 1/10/2024 at 200 mg twice daily. She remained in atrial fibrillation and started to organize into atrial flutter on 1/11/2024. She

## 2024-01-31 LAB — TSH SERPL DL<=0.005 MIU/L-ACNC: 3 UIU/ML (ref 0.27–4.2)

## 2024-02-05 PROCEDURE — 93248 EXT ECG>7D<15D REV&INTERPJ: CPT | Performed by: INTERNAL MEDICINE

## 2024-02-08 DIAGNOSIS — I48.91 ATRIAL FIBRILLATION, UNSPECIFIED TYPE (HCC): Primary | ICD-10-CM

## 2024-02-08 DIAGNOSIS — I48.0 PAROXYSMAL ATRIAL FIBRILLATION (HCC): Primary | ICD-10-CM

## 2024-02-26 ENCOUNTER — TELEPHONE (OUTPATIENT)
Dept: CARDIOLOGY CLINIC | Age: 69
End: 2024-02-26

## 2024-02-26 NOTE — H&P (VIEW-ONLY)
[facility]   Cardiac Electrophysiology Consultation   Date: 3/11/2024  Reason for Consultation:   Consult Requesting Physician: No att. providers found     Chief Complaint:   Chief Complaint   Patient presents with    Atrial Fibrillation      HPI: Flori Bartholomew is a 68 y.o. female with PMH significant for HTN, DM, GERD, depression, hypothyroidism, morbid obesity, p/w SOB and cough (1/2024), noted to be in AF/RVR, converted to SR with dilt gtt, recurrent AF, placed back on diltiazem, AF organized into AFL, placed on amiodarone, s/p RFA of tAFL (1/12/24), echo (1/2024) showed EF 55-60%, mod TR, severe pulm HTN, LAE 4.1/65.4, 2 week CAM (1/2024) showed baseline SB with 11% AF, 10% AFL, 189 runs of nonsustained AT, and 1 pause lasting 2.7 sec.    Interval History:   Today, she is here for rhythm management, presenting in SB. She complains of intermittent SOB, especially worse yesterday. She denies having any symptoms while wearing the monitor. She is questioning if she will be on amiodarone long term. She complains of GI bloating that has been worse lately.    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)  Sinus bradycardia, baseline  HTN, stable on diltiazem, Toprol  Obesity, morbid  GERD    Plan:  Stop amiodarone today due to potential side effects and ineffectiveness  Due to frequent PAF despite amiodarone, would recommend rhythm management with flecainide and AF/AFL ablation  Start flecainide 100 mg BID  Obtain ECG in 7-10 days after starting flecainide  Schedule for AF/AFL ablation at [facility]  Follow up in 1 week with EP NP after the ablation  Follow up in 6-8 weeks with me    We educated the patient that atrial fibrillation and atrial flutter are both progressive diseases, with more frequent episodes that will ensue.  Subsequent episodes usually become more sustained to the extent that many individuals would then develop persistent

## 2024-02-26 NOTE — TELEPHONE ENCOUNTER
Please let pt know that the \"30 day free\" is a once in a lifetime use. Please offer samples of Eliquis and offer to complete financial assistance application prior to OV with UL on 3/11.

## 2024-02-26 NOTE — TELEPHONE ENCOUNTER
Patient's grand daughter called to say patient is completely out of Eliquis 5mg. She says it is very expensive through the pharmacy and last that last time the Doctor was able to get them a month supply for free. Patient has applied to get on an assistance program to help with cost but needs Doctor to fill out his portion during their next appt on 3/11. Patient wants to know if there is something that can be done to get her meds until then.   [employer]- [phone]

## 2024-02-26 NOTE — PROGRESS NOTES
[facility]   Cardiac Electrophysiology Consultation   Date: 3/11/2024  Reason for Consultation:   Consult Requesting Physician: No att. providers found     Chief Complaint:   Chief Complaint   Patient presents with    Atrial Fibrillation      HPI: Flori Bartholomew is a 68 y.o. female with PMH significant for HTN, DM, GERD, depression, hypothyroidism, morbid obesity, p/w SOB and cough (1/2024), noted to be in AF/RVR, converted to SR with dilt gtt, recurrent AF, placed back on diltiazem, AF organized into AFL, placed on amiodarone, s/p RFA of tAFL (1/12/24), echo (1/2024) showed EF 55-60%, mod TR, severe pulm HTN, LAE 4.1/65.4, 2 week CAM (1/2024) showed baseline SB with 11% AF, 10% AFL, 189 runs of nonsustained AT, and 1 pause lasting 2.7 sec.    Interval History:   Today, she is here for rhythm management, presenting in SB. She complains of intermittent SOB, especially worse yesterday. She denies having any symptoms while wearing the monitor. She is questioning if she will be on amiodarone long term. She complains of GI bloating that has been worse lately.    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)  Sinus bradycardia, baseline  HTN, stable on diltiazem, Toprol  Obesity, morbid  GERD    Plan:  Stop amiodarone today due to potential side effects and ineffectiveness  Due to frequent PAF despite amiodarone, would recommend rhythm management with flecainide and AF/AFL ablation  Start flecainide 100 mg BID  Obtain ECG in 7-10 days after starting flecainide  Schedule for AF/AFL ablation at [facility]  Follow up in 1 week with EP NP after the ablation  Follow up in 6-8 weeks with me    We educated the patient that atrial fibrillation and atrial flutter are both progressive diseases, with more frequent episodes that will ensue.  Subsequent episodes usually become more sustained to the extent that many individuals would then develop persistent

## 2024-02-27 NOTE — TELEPHONE ENCOUNTER
Spoke with pt. Informed her about the 30 day free Eliquis. Also placed samples upfront for pt to . She is going to work on her Eliquis Assistant paperwork.

## 2024-03-11 ENCOUNTER — TELEPHONE (OUTPATIENT)
Dept: CARDIOLOGY CLINIC | Age: 69
End: 2024-03-11

## 2024-03-11 ENCOUNTER — OFFICE VISIT (OUTPATIENT)
Dept: CARDIOLOGY CLINIC | Age: 69
End: 2024-03-11
Payer: MEDICARE

## 2024-03-11 VITALS
DIASTOLIC BLOOD PRESSURE: 80 MMHG | SYSTOLIC BLOOD PRESSURE: 144 MMHG | HEART RATE: 51 BPM | BODY MASS INDEX: 43.43 KG/M2 | WEIGHT: 261 LBS

## 2024-03-11 DIAGNOSIS — I48.0 PAROXYSMAL ATRIAL FIBRILLATION (HCC): Primary | ICD-10-CM

## 2024-03-11 DIAGNOSIS — I10 BENIGN ESSENTIAL HTN: ICD-10-CM

## 2024-03-11 DIAGNOSIS — I48.3 TYPICAL ATRIAL FLUTTER (HCC): ICD-10-CM

## 2024-03-11 DIAGNOSIS — Z51.81 ENCOUNTER FOR MONITORING AMIODARONE THERAPY: ICD-10-CM

## 2024-03-11 DIAGNOSIS — Z79.899 ENCOUNTER FOR MONITORING AMIODARONE THERAPY: ICD-10-CM

## 2024-03-11 PROCEDURE — 3077F SYST BP >= 140 MM HG: CPT | Performed by: INTERNAL MEDICINE

## 2024-03-11 PROCEDURE — 99215 OFFICE O/P EST HI 40 MIN: CPT | Performed by: INTERNAL MEDICINE

## 2024-03-11 PROCEDURE — G8417 CALC BMI ABV UP PARAM F/U: HCPCS | Performed by: INTERNAL MEDICINE

## 2024-03-11 PROCEDURE — 3079F DIAST BP 80-89 MM HG: CPT | Performed by: INTERNAL MEDICINE

## 2024-03-11 PROCEDURE — G8484 FLU IMMUNIZE NO ADMIN: HCPCS | Performed by: INTERNAL MEDICINE

## 2024-03-11 PROCEDURE — 1123F ACP DISCUSS/DSCN MKR DOCD: CPT | Performed by: INTERNAL MEDICINE

## 2024-03-11 PROCEDURE — G8427 DOCREV CUR MEDS BY ELIG CLIN: HCPCS | Performed by: INTERNAL MEDICINE

## 2024-03-11 PROCEDURE — 3017F COLORECTAL CA SCREEN DOC REV: CPT | Performed by: INTERNAL MEDICINE

## 2024-03-11 PROCEDURE — 1090F PRES/ABSN URINE INCON ASSESS: CPT | Performed by: INTERNAL MEDICINE

## 2024-03-11 PROCEDURE — G8400 PT W/DXA NO RESULTS DOC: HCPCS | Performed by: INTERNAL MEDICINE

## 2024-03-11 PROCEDURE — 93000 ELECTROCARDIOGRAM COMPLETE: CPT | Performed by: INTERNAL MEDICINE

## 2024-03-11 PROCEDURE — 1036F TOBACCO NON-USER: CPT | Performed by: INTERNAL MEDICINE

## 2024-03-11 RX ORDER — FLECAINIDE ACETATE 100 MG/1
100 TABLET ORAL 2 TIMES DAILY
Qty: 60 TABLET | Refills: 5 | Status: SHIPPED | OUTPATIENT
Start: 2024-03-11

## 2024-03-11 NOTE — PATIENT INSTRUCTIONS
Electrophysiology Study and Atrial Fibrillation (AFib) Ablation    Date of Procedure:     Time of Arrival:     Cardiologist performing procedure: Dr. Dejesus    You will get a call from our  with the date and time.    Do not eat or drink anything after midnight the night before the procedure.      You may brush your teeth and rinse the morning of the procedure.    Please hold flecainide for 2 days prior to procedure.    Do NOT stop taking Eliquis (any blood thinners) leading up to the procedure.  However, do not take any blood thinners the morning of the procedure.    Take all your other routine medications the morning of the procedure with the following exceptions:  If you are taking diabetic medications, please HOLD on the day of the procedure (including insulin).  If you take Lantus insulin, take HALF of your usual dose the night before.  If you take a water pill, please HOLD on the day of the procedure.  If you take a blood thinner, please HOLD on the day of the procedure.  Hold any other medications as instructed above.    Do not apply any lotion, powder, or deodorant the morning of the procedure.    Please bring a list of your medications to the hospital with you.    CT scan of the chest is due within a week of the procedure. Someone will call you with the date/time of the CT scan.       Lab work is due within a week of the procedure (can be done the same day as the CT scan). You do not need to be fasting for these labs. This can be done at the [facility] Outpatient lab at [address]    You must have someone available to drive you home the same day.  It is recommended that you do not drive for 48 hours after the procedure.  You will also need someone with you at home the night of the procedure.    If you are unable to make this appointment, please call [facility] [place], at [phone].

## 2024-03-11 NOTE — TELEPHONE ENCOUNTER
Please schedule pt for AF/AFL ablation with UL at [facility]. EP form started. Instructions reviewed with pt during OV today. Please schedule pulmonary CTA at [facility].

## 2024-03-12 ENCOUNTER — PREP FOR PROCEDURE (OUTPATIENT)
Dept: CARDIOLOGY CLINIC | Age: 69
End: 2024-03-12

## 2024-03-12 RX ORDER — SODIUM CHLORIDE 9 MG/ML
INJECTION, SOLUTION INTRAVENOUS PRN
Status: CANCELLED | OUTPATIENT
Start: 2024-03-12

## 2024-03-12 RX ORDER — SODIUM CHLORIDE 0.9 % (FLUSH) 0.9 %
5-40 SYRINGE (ML) INJECTION PRN
Status: CANCELLED | OUTPATIENT
Start: 2024-03-12

## 2024-03-12 RX ORDER — SODIUM CHLORIDE 0.9 % (FLUSH) 0.9 %
5-40 SYRINGE (ML) INJECTION EVERY 12 HOURS SCHEDULED
Status: CANCELLED | OUTPATIENT
Start: 2024-03-12

## 2024-03-12 NOTE — TELEPHONE ENCOUNTER
Spoke with pt who verbalized speaking to her xochilt Kruse and got her scheduled for procedure. We went over instructions below and she verbalized understanding.       [facility]  Date: 4/3/24   Arrival time: 9:00 am   Procedure time: 9:20 am       Electrophysiology Study and Atrial Fibrillation (AFib) Ablation     Date of Procedure: 4/10/24     Time of Arrival: 10:00 am  Procedure time: 12:00 pm      Cardiologist performing procedure: Dr. Dejesus     You will get a call from our  with the date and time.     Do not eat or drink anything after midnight the night before the procedure.       You may brush your teeth and rinse the morning of the procedure.     Please hold flecainide for 2 days prior to procedure.     Do NOT stop taking Eliquis (any blood thinners) leading up to the procedure.  However, do not take any blood thinners the morning of the procedure.     Take all your other routine medications the morning of the procedure with the following exceptions:  If you are taking diabetic medications, please HOLD on the day of the procedure (including insulin).  If you take Lantus insulin, take HALF of your usual dose the night before.  If you take a blood thinner, please HOLD on the day of the procedure.  Hold any other medications as instructed above.     Do not apply any lotion, powder, or deodorant the morning of the procedure.     Please bring a list of your medications to the hospital with you.     CT scan of the chest is due within a week of the procedure. Someone will call you with the date/time of the CT scan.        Lab work is due within a week of the procedure (can be done the same day as the CT scan). You do not need to be fasting for these labs. This can be done at the [facility] Outpatient lab at [address]     You must have someone available to drive you home the same day.  It is recommended that you do not drive for 48 hours after the procedure.  You will also need someone with you

## 2024-03-12 NOTE — TELEPHONE ENCOUNTER
LVM for pt to return call to schedule procedure.     Electrophysiology Study and Atrial Fibrillation (AFib) Ablation     Date of Procedure:      Time of Arrival:   Procedure time:     Cardiologist performing procedure: Dr. Dejesus     You will get a call from our  with the date and time.     Do not eat or drink anything after midnight the night before the procedure.       You may brush your teeth and rinse the morning of the procedure.     Please hold flecainide for 2 days prior to procedure.     Do NOT stop taking Eliquis (any blood thinners) leading up to the procedure.  However, do not take any blood thinners the morning of the procedure.     Take all your other routine medications the morning of the procedure with the following exceptions:  If you are taking diabetic medications, please HOLD on the day of the procedure (including insulin).  If you take Lantus insulin, take HALF of your usual dose the night before.  If you take a blood thinner, please HOLD on the day of the procedure.  Hold any other medications as instructed above.     Do not apply any lotion, powder, or deodorant the morning of the procedure.     Please bring a list of your medications to the hospital with you.     CT scan of the chest is due within a week of the procedure. Someone will call you with the date/time of the CT scan.        Lab work is due within a week of the procedure (can be done the same day as the CT scan). You do not need to be fasting for these labs. This can be done at the [facility] Outpatient lab at [address]     You must have someone available to drive you home the same day.  It is recommended that you do not drive for 48 hours after the procedure.  You will also need someone with you at home the night of the procedure.     If you are unable to make this appointment, please call [facility] [place], at [phone].

## 2024-04-03 ENCOUNTER — HOSPITAL ENCOUNTER (OUTPATIENT)
Dept: CT IMAGING | Age: 69
Discharge: HOME | End: 2024-04-03
Payer: MEDICARE

## 2024-04-03 DIAGNOSIS — I48.0 PAROXYSMAL ATRIAL FIBRILLATION (HCC): ICD-10-CM

## 2024-04-03 LAB
ANION GAP SERPL CALCULATED.3IONS-SCNC: 14 MMOL/L (ref 3–16)
BUN SERPL-MCNC: 19 MG/DL (ref 7–20)
CALCIUM SERPL-MCNC: 9.7 MG/DL (ref 8.3–10.6)
CHLORIDE SERPL-SCNC: 96 MMOL/L (ref 99–110)
CO2 SERPL-SCNC: 26 MMOL/L (ref 21–32)
CREAT SERPL-MCNC: 0.8 MG/DL (ref 0.6–1.2)
DEPRECATED RDW RBC AUTO: 16.4 % (ref 12.4–15.4)
GFR SERPLBLD CREATININE-BSD FMLA CKD-EPI: 79 ML/MIN/{1.73_M2}
GLUCOSE SERPL-MCNC: 112 MG/DL (ref 70–99)
HCT VFR BLD AUTO: 39.5 % (ref 36–48)
HGB BLD-MCNC: 13.4 G/DL (ref 12–16)
INR PPP: 0.98 (ref 0.85–1.15)
MCH RBC QN AUTO: 31.3 PG (ref 26–34)
MCHC RBC AUTO-ENTMCNC: 33.9 G/DL (ref 31–36)
MCV RBC AUTO: 92.2 FL (ref 80–100)
PERFORMED ON: ABNORMAL
PLATELET # BLD AUTO: 188 K/UL (ref 135–450)
PMV BLD AUTO: 10.1 FL (ref 5–10.5)
POC CREATININE: 0.3 MG/DL (ref 0.6–1.2)
POC SAMPLE TYPE: ABNORMAL
POTASSIUM SERPL-SCNC: 4.7 MMOL/L (ref 3.5–5.1)
PROTHROMBIN TIME: 13.2 SEC (ref 11.9–14.9)
RBC # BLD AUTO: 4.28 M/UL (ref 4–5.2)
SODIUM SERPL-SCNC: 136 MMOL/L (ref 136–145)
WBC # BLD AUTO: 4.8 K/UL (ref 4–11)

## 2024-04-03 PROCEDURE — 75574 CT ANGIO HRT W/3D IMAGE: CPT

## 2024-04-03 PROCEDURE — 82565 ASSAY OF CREATININE: CPT

## 2024-04-04 NOTE — PROGRESS NOTES
[facility]   Electrophysiology  Office Visit  Date: 4/18/2024    Chief Complaint   Patient presents with    Palpitations    Atrial Fibrillation    Atrial Flutter    Hypertension     Cardiac HX: Flori Bartholomew is a 68 y.o.  woman with a h/o HTN, DM, GERD, depression, hypothyroidism, morbid obesity, pAF (1/2024), converted to NSR on dilt gtt, AF --> AFL, placed on amio, s/p RFA of tAFL (1/12/2024, Dr. Dejesus), echo (1/2024) EF 55-60%, mod TR, sev pulm HTN, LAE 4.1/65.4, 2 week CAM (1/2024) SB, 11% AF, 10% AFL, 189 episodes of nsAT, 1 pause lasting 2.7 secs, amio dc/'d, placed on flecainide, s/p RFA/PVI of AF/PACs (4/1024, Dr. Dejesus).     Interval History/HPI: Is here for follow-up for paroxysmal atrial fibrillation.  Patient was originally seen in January 2024 when she presented with worsening shortness of breath, cough and new onset of AF.  She started noticing symptoms 2 weeks prior with sinus congestion that progressed to shortness of breath, difficulty breathing.  She presented to ED in an outside facility and was noted to have extensive wheezing and a concern for pneumonia.  She was initially placed on IV fluids.  She was noted to be in AF/RVR and was placed on a diltiazem drip.  She then converted to normal sinus rhythm during her emergency room stay.  She was then transferred to [facility].  She was noted to be back in AF and was rate initiated on a diltiazem drip.  She then converted to normal sinus rhythm.  Her proBNP was 1100.  She was transition to diltiazem p.o. however had recurrent AF during her hospital stay.  She had an echo that showed an EF of 55 to 60%, moderate TR and severe pulmonary artery hypertension.  The patient was placed on amiodarone 200 mg twice a day on 1/10/2024.  Patient then underwent an RFA/PVI of AF and PACs on 4/10/2024.  Today she presents in NSR.  She has not felt any heart racing, palpitations or breakthrough of her atrial fibrillation since the ablation.  Right

## 2024-04-08 ENCOUNTER — TELEPHONE (OUTPATIENT)
Dept: CARDIOLOGY CLINIC | Age: 69
End: 2024-04-08

## 2024-04-08 NOTE — TELEPHONE ENCOUNTER
Patient's granddaughter Aixa called and stated the patient is confused about what medication to omit for her procedure on Wednesday.  Need a call back on  [phone].

## 2024-04-08 NOTE — TELEPHONE ENCOUNTER
Spoke with patient's granddaughter, hold flecainide 2 days prior and eliquis morning of ablation. Verbalized understanding.

## 2024-04-10 ENCOUNTER — ANESTHESIA EVENT (OUTPATIENT)
Dept: CARDIAC CATH/INVASIVE PROCEDURES | Age: 69
End: 2024-04-10
Payer: MEDICARE

## 2024-04-10 ENCOUNTER — HOSPITAL ENCOUNTER (OUTPATIENT)
Dept: CARDIAC CATH/INVASIVE PROCEDURES | Age: 69
Setting detail: OBSERVATION
Discharge: HOME | End: 2024-04-11
Attending: INTERNAL MEDICINE | Admitting: INTERNAL MEDICINE
Payer: MEDICARE

## 2024-04-10 ENCOUNTER — ANESTHESIA (OUTPATIENT)
Dept: CARDIAC CATH/INVASIVE PROCEDURES | Age: 69
End: 2024-04-10
Payer: MEDICARE

## 2024-04-10 PROBLEM — I48.0 AF (PAROXYSMAL ATRIAL FIBRILLATION) (HCC): Status: ACTIVE | Noted: 2024-04-10

## 2024-04-10 LAB
ABO + RH BLD: NORMAL
BLD GP AB SCN SERPL QL: NORMAL
EKG ATRIAL RATE: 70 BPM
EKG DIAGNOSIS: NORMAL
EKG P AXIS: 38 DEGREES
EKG P-R INTERVAL: 178 MS
EKG Q-T INTERVAL: 422 MS
EKG QRS DURATION: 112 MS
EKG QTC CALCULATION (BAZETT): 455 MS
EKG R AXIS: -43 DEGREES
EKG T AXIS: 31 DEGREES
EKG VENTRICULAR RATE: 70 BPM
GLUCOSE BLD-MCNC: 100 MG/DL (ref 70–99)
PERFORMED ON: ABNORMAL
POC ACT LR: 273 SEC
POC ACT LR: 334 SEC
POC ACT LR: 336 SEC

## 2024-04-10 PROCEDURE — C1759 CATH, INTRA ECHOCARDIOGRAPHY: HCPCS | Performed by: INTERNAL MEDICINE

## 2024-04-10 PROCEDURE — 6360000002 HC RX W HCPCS

## 2024-04-10 PROCEDURE — 86850 RBC ANTIBODY SCREEN: CPT

## 2024-04-10 PROCEDURE — 2709999900 HC NON-CHARGEABLE SUPPLY: Performed by: INTERNAL MEDICINE

## 2024-04-10 PROCEDURE — C1760 CLOSURE DEV, VASC: HCPCS | Performed by: INTERNAL MEDICINE

## 2024-04-10 PROCEDURE — 36415 COLL VENOUS BLD VENIPUNCTURE: CPT

## 2024-04-10 PROCEDURE — 6370000000 HC RX 637 (ALT 250 FOR IP): Performed by: INTERNAL MEDICINE

## 2024-04-10 PROCEDURE — C1766 INTRO/SHEATH,STRBLE,NON-PEEL: HCPCS | Performed by: INTERNAL MEDICINE

## 2024-04-10 PROCEDURE — 3700000000 HC ANESTHESIA ATTENDED CARE: Performed by: ANESTHESIOLOGY

## 2024-04-10 PROCEDURE — G0378 HOSPITAL OBSERVATION PER HR: HCPCS

## 2024-04-10 PROCEDURE — 2720000010 HC SURG SUPPLY STERILE: Performed by: INTERNAL MEDICINE

## 2024-04-10 PROCEDURE — 93623 PRGRMD STIMJ&PACG IV RX NFS: CPT | Performed by: INTERNAL MEDICINE

## 2024-04-10 PROCEDURE — 7100000000 HC PACU RECOVERY - FIRST 15 MIN: Performed by: ANESTHESIOLOGY

## 2024-04-10 PROCEDURE — C1894 INTRO/SHEATH, NON-LASER: HCPCS | Performed by: INTERNAL MEDICINE

## 2024-04-10 PROCEDURE — G0269 OCCLUSIVE DEVICE IN VEIN ART: HCPCS

## 2024-04-10 PROCEDURE — 86900 BLOOD TYPING SEROLOGIC ABO: CPT

## 2024-04-10 PROCEDURE — 93005 ELECTROCARDIOGRAM TRACING: CPT | Performed by: INTERNAL MEDICINE

## 2024-04-10 PROCEDURE — 93622 COMP EP EVAL L VENTR PAC&REC: CPT | Performed by: INTERNAL MEDICINE

## 2024-04-10 PROCEDURE — 2580000003 HC RX 258

## 2024-04-10 PROCEDURE — 93010 ELECTROCARDIOGRAM REPORT: CPT | Performed by: INTERNAL MEDICINE

## 2024-04-10 PROCEDURE — 93656 COMPRE EP EVAL ABLTJ ATR FIB: CPT

## 2024-04-10 PROCEDURE — 93657 TX L/R ATRIAL FIB ADDL: CPT

## 2024-04-10 PROCEDURE — 7100000001 HC PACU RECOVERY - ADDTL 15 MIN: Performed by: ANESTHESIOLOGY

## 2024-04-10 PROCEDURE — C1732 CATH, EP, DIAG/ABL, 3D/VECT: HCPCS | Performed by: INTERNAL MEDICINE

## 2024-04-10 PROCEDURE — 2500000003 HC RX 250 WO HCPCS

## 2024-04-10 PROCEDURE — 93656 COMPRE EP EVAL ABLTJ ATR FIB: CPT | Performed by: INTERNAL MEDICINE

## 2024-04-10 PROCEDURE — 93657 TX L/R ATRIAL FIB ADDL: CPT | Performed by: INTERNAL MEDICINE

## 2024-04-10 PROCEDURE — 6360000002 HC RX W HCPCS: Performed by: NURSE ANESTHETIST, CERTIFIED REGISTERED

## 2024-04-10 PROCEDURE — 2500000003 HC RX 250 WO HCPCS: Performed by: NURSE ANESTHETIST, CERTIFIED REGISTERED

## 2024-04-10 PROCEDURE — 86901 BLOOD TYPING SEROLOGIC RH(D): CPT

## 2024-04-10 PROCEDURE — 2580000003 HC RX 258: Performed by: INTERNAL MEDICINE

## 2024-04-10 PROCEDURE — 2580000003 HC RX 258: Performed by: NURSE ANESTHETIST, CERTIFIED REGISTERED

## 2024-04-10 PROCEDURE — 85347 COAGULATION TIME ACTIVATED: CPT

## 2024-04-10 PROCEDURE — 3700000001 HC ADD 15 MINUTES (ANESTHESIA): Performed by: ANESTHESIOLOGY

## 2024-04-10 RX ORDER — FUROSEMIDE 40 MG/1
40 TABLET ORAL DAILY
Qty: 60 TABLET | Refills: 3 | Status: SHIPPED | OUTPATIENT
Start: 2024-04-10

## 2024-04-10 RX ORDER — DEXAMETHASONE SODIUM PHOSPHATE 4 MG/ML
INJECTION, SOLUTION INTRA-ARTICULAR; INTRALESIONAL; INTRAMUSCULAR; INTRAVENOUS; SOFT TISSUE PRN
Status: DISCONTINUED | OUTPATIENT
Start: 2024-04-10 | End: 2024-04-10 | Stop reason: SDUPTHER

## 2024-04-10 RX ORDER — SODIUM CHLORIDE 9 MG/ML
INJECTION, SOLUTION INTRAVENOUS PRN
Status: DISCONTINUED | OUTPATIENT
Start: 2024-04-10 | End: 2024-04-10 | Stop reason: HOSPADM

## 2024-04-10 RX ORDER — ONDANSETRON 2 MG/ML
4 INJECTION INTRAMUSCULAR; INTRAVENOUS
Status: DISCONTINUED | OUTPATIENT
Start: 2024-04-10 | End: 2024-04-10 | Stop reason: HOSPADM

## 2024-04-10 RX ORDER — MEPERIDINE HYDROCHLORIDE 25 MG/ML
12.5 INJECTION INTRAMUSCULAR; INTRAVENOUS; SUBCUTANEOUS EVERY 5 MIN PRN
Status: DISCONTINUED | OUTPATIENT
Start: 2024-04-10 | End: 2024-04-10 | Stop reason: HOSPADM

## 2024-04-10 RX ORDER — FAMOTIDINE 10 MG/ML
INJECTION, SOLUTION INTRAVENOUS PRN
Status: DISCONTINUED | OUTPATIENT
Start: 2024-04-10 | End: 2024-04-10 | Stop reason: SDUPTHER

## 2024-04-10 RX ORDER — SUCCINYLCHOLINE/SOD CL,ISO/PF 200MG/10ML
SYRINGE (ML) INTRAVENOUS PRN
Status: DISCONTINUED | OUTPATIENT
Start: 2024-04-10 | End: 2024-04-10 | Stop reason: SDUPTHER

## 2024-04-10 RX ORDER — ACETAMINOPHEN 325 MG/1
650 TABLET ORAL EVERY 4 HOURS PRN
Status: DISCONTINUED | OUTPATIENT
Start: 2024-04-10 | End: 2024-04-11 | Stop reason: HOSPADM

## 2024-04-10 RX ORDER — SODIUM CHLORIDE 0.9 % (FLUSH) 0.9 %
5-40 SYRINGE (ML) INJECTION PRN
Status: DISCONTINUED | OUTPATIENT
Start: 2024-04-10 | End: 2024-04-11 | Stop reason: HOSPADM

## 2024-04-10 RX ORDER — MEPERIDINE HYDROCHLORIDE 25 MG/ML
12.5 INJECTION INTRAMUSCULAR; INTRAVENOUS; SUBCUTANEOUS ONCE
Status: DISCONTINUED | OUTPATIENT
Start: 2024-04-10 | End: 2024-04-10 | Stop reason: HOSPADM

## 2024-04-10 RX ORDER — PROTAMINE SULFATE 10 MG/ML
INJECTION, SOLUTION INTRAVENOUS PRN
Status: DISCONTINUED | OUTPATIENT
Start: 2024-04-10 | End: 2024-04-10 | Stop reason: SDUPTHER

## 2024-04-10 RX ORDER — HYDROMORPHONE HYDROCHLORIDE 2 MG/ML
0.5 INJECTION, SOLUTION INTRAMUSCULAR; INTRAVENOUS; SUBCUTANEOUS EVERY 5 MIN PRN
Status: DISCONTINUED | OUTPATIENT
Start: 2024-04-10 | End: 2024-04-10 | Stop reason: HOSPADM

## 2024-04-10 RX ORDER — ACETAMINOPHEN 325 MG/1
650 TABLET ORAL
Status: DISCONTINUED | OUTPATIENT
Start: 2024-04-10 | End: 2024-04-10 | Stop reason: HOSPADM

## 2024-04-10 RX ORDER — SODIUM CHLORIDE 0.9 % (FLUSH) 0.9 %
5-40 SYRINGE (ML) INJECTION EVERY 12 HOURS SCHEDULED
Status: DISCONTINUED | OUTPATIENT
Start: 2024-04-10 | End: 2024-04-10 | Stop reason: HOSPADM

## 2024-04-10 RX ORDER — FENTANYL CITRATE 50 UG/ML
50 INJECTION, SOLUTION INTRAMUSCULAR; INTRAVENOUS EVERY 5 MIN PRN
Status: DISCONTINUED | OUTPATIENT
Start: 2024-04-10 | End: 2024-04-10 | Stop reason: HOSPADM

## 2024-04-10 RX ORDER — LORAZEPAM 2 MG/ML
0.5 INJECTION INTRAMUSCULAR
Status: DISCONTINUED | OUTPATIENT
Start: 2024-04-10 | End: 2024-04-10 | Stop reason: HOSPADM

## 2024-04-10 RX ORDER — HEPARIN SODIUM 1000 [USP'U]/ML
INJECTION, SOLUTION INTRAVENOUS; SUBCUTANEOUS PRN
Status: DISCONTINUED | OUTPATIENT
Start: 2024-04-10 | End: 2024-04-10 | Stop reason: SDUPTHER

## 2024-04-10 RX ORDER — SODIUM CHLORIDE 0.9 % (FLUSH) 0.9 %
5-40 SYRINGE (ML) INJECTION EVERY 12 HOURS SCHEDULED
Status: DISCONTINUED | OUTPATIENT
Start: 2024-04-10 | End: 2024-04-11 | Stop reason: HOSPADM

## 2024-04-10 RX ORDER — SODIUM CHLORIDE 9 MG/ML
INJECTION, SOLUTION INTRAVENOUS PRN
Status: DISCONTINUED | OUTPATIENT
Start: 2024-04-10 | End: 2024-04-11 | Stop reason: HOSPADM

## 2024-04-10 RX ORDER — OXYCODONE HYDROCHLORIDE 5 MG/1
5 TABLET ORAL PRN
Status: DISCONTINUED | OUTPATIENT
Start: 2024-04-10 | End: 2024-04-10 | Stop reason: HOSPADM

## 2024-04-10 RX ORDER — IPRATROPIUM BROMIDE AND ALBUTEROL SULFATE 2.5; .5 MG/3ML; MG/3ML
1 SOLUTION RESPIRATORY (INHALATION)
Status: DISCONTINUED | OUTPATIENT
Start: 2024-04-10 | End: 2024-04-10 | Stop reason: HOSPADM

## 2024-04-10 RX ORDER — LIDOCAINE HYDROCHLORIDE 20 MG/ML
INJECTION, SOLUTION EPIDURAL; INFILTRATION; INTRACAUDAL; PERINEURAL PRN
Status: DISCONTINUED | OUTPATIENT
Start: 2024-04-10 | End: 2024-04-10 | Stop reason: SDUPTHER

## 2024-04-10 RX ORDER — DEXMEDETOMIDINE HYDROCHLORIDE 100 UG/ML
INJECTION, SOLUTION INTRAVENOUS PRN
Status: DISCONTINUED | OUTPATIENT
Start: 2024-04-10 | End: 2024-04-10 | Stop reason: SDUPTHER

## 2024-04-10 RX ORDER — SODIUM CHLORIDE 0.9 % (FLUSH) 0.9 %
5-40 SYRINGE (ML) INJECTION PRN
Status: DISCONTINUED | OUTPATIENT
Start: 2024-04-10 | End: 2024-04-10 | Stop reason: HOSPADM

## 2024-04-10 RX ORDER — LABETALOL HYDROCHLORIDE 5 MG/ML
10 INJECTION, SOLUTION INTRAVENOUS
Status: DISCONTINUED | OUTPATIENT
Start: 2024-04-10 | End: 2024-04-10 | Stop reason: HOSPADM

## 2024-04-10 RX ORDER — ONDANSETRON 2 MG/ML
INJECTION INTRAMUSCULAR; INTRAVENOUS PRN
Status: DISCONTINUED | OUTPATIENT
Start: 2024-04-10 | End: 2024-04-10 | Stop reason: SDUPTHER

## 2024-04-10 RX ORDER — PROPOFOL 10 MG/ML
INJECTION, EMULSION INTRAVENOUS PRN
Status: DISCONTINUED | OUTPATIENT
Start: 2024-04-10 | End: 2024-04-10 | Stop reason: SDUPTHER

## 2024-04-10 RX ORDER — HEPARIN SODIUM 10000 [USP'U]/100ML
INJECTION, SOLUTION INTRAVENOUS CONTINUOUS PRN
Status: DISCONTINUED | OUTPATIENT
Start: 2024-04-10 | End: 2024-04-10 | Stop reason: SDUPTHER

## 2024-04-10 RX ORDER — FENTANYL CITRATE 50 UG/ML
25 INJECTION, SOLUTION INTRAMUSCULAR; INTRAVENOUS EVERY 5 MIN PRN
Status: DISCONTINUED | OUTPATIENT
Start: 2024-04-10 | End: 2024-04-10 | Stop reason: HOSPADM

## 2024-04-10 RX ORDER — NALOXONE HYDROCHLORIDE 0.4 MG/ML
INJECTION, SOLUTION INTRAMUSCULAR; INTRAVENOUS; SUBCUTANEOUS PRN
Status: DISCONTINUED | OUTPATIENT
Start: 2024-04-10 | End: 2024-04-10 | Stop reason: HOSPADM

## 2024-04-10 RX ORDER — OXYCODONE HYDROCHLORIDE 5 MG/1
10 TABLET ORAL PRN
Status: DISCONTINUED | OUTPATIENT
Start: 2024-04-10 | End: 2024-04-10 | Stop reason: HOSPADM

## 2024-04-10 RX ORDER — PROCHLORPERAZINE EDISYLATE 5 MG/ML
5 INJECTION INTRAMUSCULAR; INTRAVENOUS
Status: DISCONTINUED | OUTPATIENT
Start: 2024-04-10 | End: 2024-04-10 | Stop reason: HOSPADM

## 2024-04-10 RX ORDER — FUROSEMIDE 10 MG/ML
INJECTION INTRAMUSCULAR; INTRAVENOUS PRN
Status: DISCONTINUED | OUTPATIENT
Start: 2024-04-10 | End: 2024-04-10 | Stop reason: SDUPTHER

## 2024-04-10 RX ORDER — FENTANYL CITRATE 50 UG/ML
INJECTION, SOLUTION INTRAMUSCULAR; INTRAVENOUS PRN
Status: DISCONTINUED | OUTPATIENT
Start: 2024-04-10 | End: 2024-04-10 | Stop reason: SDUPTHER

## 2024-04-10 RX ORDER — DIPHENHYDRAMINE HYDROCHLORIDE 50 MG/ML
12.5 INJECTION INTRAMUSCULAR; INTRAVENOUS
Status: DISCONTINUED | OUTPATIENT
Start: 2024-04-10 | End: 2024-04-10 | Stop reason: HOSPADM

## 2024-04-10 RX ADMIN — ONDANSETRON 4 MG: 2 INJECTION INTRAMUSCULAR; INTRAVENOUS at 14:47

## 2024-04-10 RX ADMIN — PROTAMINE SULFATE 30 MG: 10 INJECTION, SOLUTION INTRAVENOUS at 14:40

## 2024-04-10 RX ADMIN — DEXMEDETOMIDINE HYDROCHLORIDE 2 MCG: 100 INJECTION, SOLUTION INTRAVENOUS at 14:38

## 2024-04-10 RX ADMIN — PROPOFOL 50 MG: 10 INJECTION, EMULSION INTRAVENOUS at 13:30

## 2024-04-10 RX ADMIN — DEXMEDETOMIDINE HYDROCHLORIDE 2 MCG: 100 INJECTION, SOLUTION INTRAVENOUS at 14:43

## 2024-04-10 RX ADMIN — Medication 1000 UNITS/HR: at 14:09

## 2024-04-10 RX ADMIN — FENTANYL CITRATE 50 MCG: 50 INJECTION, SOLUTION INTRAMUSCULAR; INTRAVENOUS at 13:30

## 2024-04-10 RX ADMIN — PROPOFOL 100 MG: 10 INJECTION, EMULSION INTRAVENOUS at 13:10

## 2024-04-10 RX ADMIN — DEXMEDETOMIDINE HYDROCHLORIDE 4 MCG: 100 INJECTION, SOLUTION INTRAVENOUS at 15:01

## 2024-04-10 RX ADMIN — APIXABAN 5 MG: 5 TABLET, FILM COATED ORAL at 18:41

## 2024-04-10 RX ADMIN — SODIUM CHLORIDE: 9 INJECTION, SOLUTION INTRAVENOUS at 13:06

## 2024-04-10 RX ADMIN — DEXMEDETOMIDINE HYDROCHLORIDE 2 MCG: 100 INJECTION, SOLUTION INTRAVENOUS at 14:49

## 2024-04-10 RX ADMIN — HEPARIN SODIUM 1000 UNITS: 1000 INJECTION INTRAVENOUS; SUBCUTANEOUS at 14:26

## 2024-04-10 RX ADMIN — FUROSEMIDE 20 MG: 10 INJECTION, SOLUTION INTRAMUSCULAR; INTRAVENOUS at 14:38

## 2024-04-10 RX ADMIN — ISOPROTERENOL HYDROCHLORIDE 10 MCG/MIN: 0.2 INJECTION, SOLUTION INTRAMUSCULAR; INTRAVENOUS at 14:28

## 2024-04-10 RX ADMIN — DEXMEDETOMIDINE HYDROCHLORIDE 2 MCG: 100 INJECTION, SOLUTION INTRAVENOUS at 14:46

## 2024-04-10 RX ADMIN — HEPARIN SODIUM 3000 UNITS: 1000 INJECTION INTRAVENOUS; SUBCUTANEOUS at 13:50

## 2024-04-10 RX ADMIN — FENTANYL CITRATE 50 MCG: 50 INJECTION, SOLUTION INTRAMUSCULAR; INTRAVENOUS at 13:10

## 2024-04-10 RX ADMIN — FAMOTIDINE 20 MG: 10 INJECTION INTRAVENOUS at 13:39

## 2024-04-10 RX ADMIN — PHENYLEPHRINE HYDROCHLORIDE 25 MCG/MIN: 10 INJECTION INTRAVENOUS at 13:40

## 2024-04-10 RX ADMIN — Medication 140 MG: at 13:10

## 2024-04-10 RX ADMIN — HEPARIN SODIUM 10000 UNITS: 1000 INJECTION INTRAVENOUS; SUBCUTANEOUS at 13:35

## 2024-04-10 RX ADMIN — DEXMEDETOMIDINE HYDROCHLORIDE 4 MCG: 100 INJECTION, SOLUTION INTRAVENOUS at 14:58

## 2024-04-10 RX ADMIN — SODIUM CHLORIDE, PRESERVATIVE FREE 10 ML: 5 INJECTION INTRAVENOUS at 19:38

## 2024-04-10 RX ADMIN — DEXAMETHASONE SODIUM PHOSPHATE 8 MG: 4 INJECTION, SOLUTION INTRAMUSCULAR; INTRAVENOUS at 13:16

## 2024-04-10 RX ADMIN — DEXMEDETOMIDINE HYDROCHLORIDE 2 MCG: 100 INJECTION, SOLUTION INTRAVENOUS at 14:47

## 2024-04-10 RX ADMIN — LIDOCAINE HYDROCHLORIDE 100 MG: 20 INJECTION, SOLUTION EPIDURAL; INFILTRATION; INTRACAUDAL; PERINEURAL at 13:10

## 2024-04-10 ASSESSMENT — ENCOUNTER SYMPTOMS: SHORTNESS OF BREATH: 1

## 2024-04-10 ASSESSMENT — LIFESTYLE VARIABLES: SMOKING_STATUS: 0

## 2024-04-10 NOTE — PROGRESS NOTES
Pt up and ambulating @ 1700, assisted pt to BR, doing well, pt back in bed and new drainage noted to R groin. Pt flat and Pressure held for 20min. Drsng removed and 1 venous site continuing to ooze. No hematoma. Pt L ear also cont' to ooze. Pt denies pain. Just feels \"like its hard to hear or fluid is in there.\" Call placed to Dr Jenna Gorman made aware of status. Orders placed for pt to  be observed over night

## 2024-04-10 NOTE — DISCHARGE INSTRUCTIONS
[facility]  Electrophysiology    Dr. Bob Antonio, CNP  Tere Quinton, CNP  Radha Gonzalez, RN  [phone]    Cardiac Catheter Ablation  Discharge Instructions      WHAT YOU SHOULD KNOW:   Your heart has a special electrical system built into it that controls your heart rhythm. Sometimes there is a problem with this electrical system in the heart muscle. This problem may cause an arrhythmia, or abnormal heart rhythm. If medicine does not correct the problem, or if you do not wish to take medicines long-term, you may need a cardiac ablation. An ablation may also be called a catheter ablation, or a radiofrequency ablation.    An ablation procedure is usually done at the same time as an electrophysiology study. This test is used to \"map out\" the electrical pathways in your heart that control your heart rhythm. This test helps your doctor find the exact spot where the ablation needs to be done. During an ablation, energy is sent through a special catheter to the area of your heart that has the electrical problem. This energy causes a tiny area of the heart muscle to scar, stopping the electrical problem and allowing your heart to beat regularly. Ask your caregiver for more information about your heart problem, and tests and treatments that may be done for it.       AFTER YOU LEAVE:     Home care:    For the next 24 hours please hold pressure to your groin when you cough, sneeze, or change positions.    Activity: After your ablation, rest for one to two days. Avoid using stairs for a few days. When you must use stairs, step up with the leg that was not used for the ablation. Straighten this leg to move the other leg up to the next step without putting stress on it.    No strenuous activity for 1 week, no lifting greater than 10lbs for 1 week.     Do not strain while having a bowel movement. If you are constipated, take an over-the-counter stool softener such as Metamucil or Citracel.

## 2024-04-10 NOTE — ANESTHESIA PRE PROCEDURE
AM    GFRAA >60 02/16/2022 03:16 PM    AGRATIO 1.2 01/08/2024 02:20 AM    LABGLOM >90 04/03/2024 08:38 AM    GLUCOSE 112 04/03/2024 08:18 AM    PROT 7.1 01/08/2024 02:20 AM    CALCIUM 9.7 04/03/2024 08:18 AM    BILITOT 0.4 01/08/2024 02:20 AM    ALKPHOS 92 01/08/2024 02:20 AM    AST 25 01/08/2024 02:20 AM    ALT 21 01/08/2024 02:20 AM       POC Tests: No results for input(s): \"POCGLU\", \"POCNA\", \"POCK\", \"POCCL\", \"POCBUN\", \"POCHEMO\", \"POCHCT\" in the last 72 hours.    Coags:   Lab Results   Component Value Date/Time    PROTIME 13.2 04/03/2024 08:18 AM    INR 0.98 04/03/2024 08:18 AM    APTT 47.1 01/08/2024 08:31 PM       HCG (If Applicable): No results found for: \"PREGTESTUR\", \"PREGSERUM\", \"HCG\", \"HCGQUANT\"     ABGs: No results found for: \"PHART\", \"PO2ART\", \"GRW2DQV\", \"WUK9WWJ\", \"BEART\", \"H2SFHPZA\"     Type & Screen (If Applicable):  No results found for: \"LABABO\", \"LABRH\"    Drug/Infectious Status (If Applicable):  No results found for: \"HIV\", \"HEPCAB\"    COVID-19 Screening (If Applicable):   Lab Results   Component Value Date/Time    COVID19 Not Detected 01/08/2024 01:35 AM           Anesthesia Evaluation  Patient summary reviewed and Nursing notes reviewed   no history of anesthetic complications:   Airway: Mallampati: II  TM distance: >3 FB   Neck ROM: full  Mouth opening: > = 3 FB   Dental: normal exam   (+) partials      Pulmonary:normal exam    (+)   shortness of breath:             (-) not a current smoker                           Cardiovascular:  Exercise tolerance: good (>4 METS)  (+) hypertension:, dysrhythmias: atrial fibrillation and atrial flutter      ECG reviewed  Rhythm: regular  Rate: normal  Echocardiogram reviewed               ROS comment: Summary   Normal left ventricle size, wall thickness, and systolic function with an   estimated ejection fraction of 55-60%. No regional wall motion abnormalities   are seen.   Normal diastolic function.   The right ventricle is normal in size and function.

## 2024-04-10 NOTE — PROGRESS NOTES
From Cath lab awakening, VSS, right groin soft, old drainage noted, strong pedal pulse, pt. Had left ear bloody drainage following procedure per report from RN and CRNA, MD aware, pt. Denies pain at this time

## 2024-04-10 NOTE — PROGRESS NOTES
Called patient and conveyed results per Dr Reddy.  Patient was able to verbalize understanding of the results.    Patient is going to call the office back to schedule the appointment to see Dr Reddy.   Oozing to L. Ear slowing down, appears to have a lot of old blood to inside ear. Additional 20 min of pressure held to Rgroin, D-stat placed with tegaderm. Neurovascular status intact. Vss, Confirmed with  MD that he still wanted pt to have elequis. Elequis given. transferred to CVU.

## 2024-04-10 NOTE — PROGRESS NOTES
PRE-PROCEDURE    DATE: 4/10/2024 ARRIVAL TO CATH LAB: 10:38 AM    ADMIT SOURCE: Outpatient    ID & ALLERGY BAND: On    CONSENT: Yes    NPO SINCE: Midnight    LABS/PREGNANCY TEST: N/A    PULSES: Right DP 2+  Right PT 1+  Left DP 2+  Left PT 1+    IV SITE : Started in left arm.  with fluids infusing at kvo 10:38 AM     EKG RHYTHM: Normal Sinus Rhythm    Anesthesia Dr Cadena here to see patient.

## 2024-04-10 NOTE — PROGRESS NOTES
Pt to CVU 16 at this time. Vitals obtained, see flowsheet, sp02 835, placed on 2L NC, up to 95%. L ear still oozing bright red blood, ENT was consulted. R groin site barely oozing, assesed with pacu nurse, states that was how it has been. Neuros intact. Pt is alert and oriented x 4.

## 2024-04-10 NOTE — ANESTHESIA POSTPROCEDURE EVALUATION
Department of Anesthesiology  Postprocedure Note    Patient: Flori Bartholomew  MRN: [id number]  YOB: 1955  Date of evaluation: 4/10/2024    Procedure Summary       Date: 04/10/24 Room / Location: [facility] Cath Lab    Anesthesia Start: 1306 Anesthesia Stop: 1526    Procedure: ABLATION Diagnosis: Paroxysmal atrial fibrillation    Scheduled Providers: PANCHO Cadena MD Responsible Provider: PANCHO Cadena MD    Anesthesia Type: general ASA Status: 3            Anesthesia Type: No value filed.    Ingrid Phase I: Ingrid Score: 8    Ingrid Phase II:      Anesthesia Post Evaluation    Patient location during evaluation: PACU  Patient participation: complete - patient participated  Level of consciousness: awake and alert  Pain score: 0  Airway patency: patent  Nausea & Vomiting: no nausea  Cardiovascular status: blood pressure returned to baseline  Respiratory status: acceptable  Hydration status: euvolemic  Comments: At end of procedure prior to leaving procedure suite, patient noted to be having bleeding from her left ear. No obvious source. ACT ordered. Pressure applied taken to pacu. After approximately 40 minutes still bleeding from ear, ACT repeated returned at 159. At this time bleeding noted to have stopped in her ear. Watched for 40 more minutes and bleeding didn't resume from ear. Patient reports \"funny\" feeling in ear. On exam, blood in canal but no active signs of bleeding, no obvious mass. Upon questioning patient does report scratching her ear canal with her fingernail yesterday. Reassured.   Pain management: adequate    No notable events documented.

## 2024-04-10 NOTE — PROCEDURES
right atrium.  The heparin was stopped.  Previously ablated CTI continues to have bidirectional block.    The ICE catheter was placed in the RV.  There was no pericardial effusion.    Protamine 30 mg was given.  When the ACT was appropriate, catheters and sheaths were removed and hemostasis was obtained via Vascade MVP venous closure device.  No complications noted.              Conclusion:     - Pre- and post-procedure diagnoses were paroxysmal atrial fibrillation   - Pulmonary vein isolations using wide area circumferential radiofrequency ablation   - Additional focal ablation for atrial fibrillation, outside the pulmonary veins in left atrium  No dormant conduction with Adenosine  Isuprel infusion, with an attempt to induce arrhythmia    Plan:  Resume OAC with Eliquis 5 mg bid  PPI for 30 days  Continue flecainide for 3 more months  Continue Cardizem and metoprolol  Secondary to severe pulmonary hypertension, I started her on Lasix 40 mg p.o. daily  Bedrest for 2 hours  Emphasized the importance of evaluation for sleep apnea with family  Outpatient PFT    Following the procedure, patient woke up and she felt there is a fullness in her left ear.  Hence, she inserted her finger into the left ear and since then, she started having bright red blood.  On examination, there is no pulsatile blood flow.  However, floor of the ear canal have some minimal bruising.  Patient feels there is a fullness over there.  Hence, she is being observed over the night in the hospital with ENT consultation.    Secondary to left atrial ablation, she needs to be on oral anticoagulation.  Hence, I resumed her Eliquis from tonight onwards.    Thank you for allowing us to participate in the care of your patient. If you have any questions or concerns, please do not hesitate to contact me.    Miguelina Goodwin MD   Cardiac Electrophysiology  [facility]  Office [phone]  [employer]

## 2024-04-10 NOTE — INTERVAL H&P NOTE
Update History & Physical    The patient's History and Physical of March 11, by me was reviewed with the patient and I examined the patient. There was no change. The surgical site was confirmed by the patient and me.     Plan: The risks, benefits, expected outcome, and alternative to the recommended procedure have been discussed with the patient. Patient understands and wants to proceed with the procedure.     Electronically signed by Bob Goodwin MD on 4/10/2024 at 1:18 PM

## 2024-04-11 ENCOUNTER — TELEPHONE (OUTPATIENT)
Dept: CARDIOLOGY CLINIC | Age: 69
End: 2024-04-11

## 2024-04-11 VITALS
HEIGHT: 65 IN | RESPIRATION RATE: 18 BRPM | OXYGEN SATURATION: 92 % | SYSTOLIC BLOOD PRESSURE: 104 MMHG | HEART RATE: 66 BPM | TEMPERATURE: 98.8 F | WEIGHT: 250.44 LBS | DIASTOLIC BLOOD PRESSURE: 62 MMHG | BODY MASS INDEX: 41.73 KG/M2

## 2024-04-11 LAB
ANION GAP SERPL CALCULATED.3IONS-SCNC: 10 MMOL/L (ref 3–16)
BASOPHILS # BLD: 0 K/UL (ref 0–0.2)
BASOPHILS NFR BLD: 0.2 %
BUN SERPL-MCNC: 18 MG/DL (ref 7–20)
CALCIUM SERPL-MCNC: 9.1 MG/DL (ref 8.3–10.6)
CHLORIDE SERPL-SCNC: 98 MMOL/L (ref 99–110)
CO2 SERPL-SCNC: 27 MMOL/L (ref 21–32)
CREAT SERPL-MCNC: 0.6 MG/DL (ref 0.6–1.2)
DEPRECATED RDW RBC AUTO: 15.7 % (ref 12.4–15.4)
EKG ATRIAL RATE: 79 BPM
EKG DIAGNOSIS: NORMAL
EKG P AXIS: 49 DEGREES
EKG P-R INTERVAL: 170 MS
EKG Q-T INTERVAL: 410 MS
EKG QRS DURATION: 108 MS
EKG QTC CALCULATION (BAZETT): 470 MS
EKG R AXIS: -41 DEGREES
EKG T AXIS: 61 DEGREES
EKG VENTRICULAR RATE: 79 BPM
EOSINOPHIL # BLD: 0 K/UL (ref 0–0.6)
EOSINOPHIL NFR BLD: 0.1 %
GFR SERPLBLD CREATININE-BSD FMLA CKD-EPI: >90 ML/MIN/{1.73_M2}
GLUCOSE SERPL-MCNC: 158 MG/DL (ref 70–99)
HCT VFR BLD AUTO: 37.9 % (ref 36–48)
HGB BLD-MCNC: 12.9 G/DL (ref 12–16)
LYMPHOCYTES # BLD: 0.8 K/UL (ref 1–5.1)
LYMPHOCYTES NFR BLD: 8.7 %
MCH RBC QN AUTO: 31.5 PG (ref 26–34)
MCHC RBC AUTO-ENTMCNC: 34 G/DL (ref 31–36)
MCV RBC AUTO: 92.8 FL (ref 80–100)
MONOCYTES # BLD: 0.8 K/UL (ref 0–1.3)
MONOCYTES NFR BLD: 8 %
NEUTROPHILS # BLD: 8 K/UL (ref 1.7–7.7)
NEUTROPHILS NFR BLD: 83 %
PLATELET # BLD AUTO: 181 K/UL (ref 135–450)
PMV BLD AUTO: 8.6 FL (ref 5–10.5)
POC ACT LR: 159 SEC
POTASSIUM SERPL-SCNC: 4 MMOL/L (ref 3.5–5.1)
RBC # BLD AUTO: 4.09 M/UL (ref 4–5.2)
SODIUM SERPL-SCNC: 135 MMOL/L (ref 136–145)
WBC # BLD AUTO: 9.7 K/UL (ref 4–11)

## 2024-04-11 PROCEDURE — G0378 HOSPITAL OBSERVATION PER HR: HCPCS

## 2024-04-11 PROCEDURE — 99239 HOSP IP/OBS DSCHRG MGMT >30: CPT

## 2024-04-11 PROCEDURE — 93010 ELECTROCARDIOGRAM REPORT: CPT | Performed by: INTERNAL MEDICINE

## 2024-04-11 PROCEDURE — 85025 COMPLETE CBC W/AUTO DIFF WBC: CPT

## 2024-04-11 PROCEDURE — 80048 BASIC METABOLIC PNL TOTAL CA: CPT

## 2024-04-11 PROCEDURE — 2580000003 HC RX 258: Performed by: INTERNAL MEDICINE

## 2024-04-11 PROCEDURE — 93005 ELECTROCARDIOGRAM TRACING: CPT | Performed by: INTERNAL MEDICINE

## 2024-04-11 PROCEDURE — 6370000000 HC RX 637 (ALT 250 FOR IP): Performed by: INTERNAL MEDICINE

## 2024-04-11 RX ORDER — SODIUM CHLORIDE 0.9 % (FLUSH) 0.9 %
5-40 SYRINGE (ML) INJECTION PRN
Status: DISCONTINUED | OUTPATIENT
Start: 2024-04-11 | End: 2024-04-11 | Stop reason: HOSPADM

## 2024-04-11 RX ORDER — PANTOPRAZOLE SODIUM 40 MG/1
40 TABLET, DELAYED RELEASE ORAL DAILY
Status: DISCONTINUED | OUTPATIENT
Start: 2024-04-11 | End: 2024-04-11 | Stop reason: HOSPADM

## 2024-04-11 RX ORDER — LEVOTHYROXINE SODIUM 0.07 MG/1
150 TABLET ORAL DAILY
Status: DISCONTINUED | OUTPATIENT
Start: 2024-04-11 | End: 2024-04-11 | Stop reason: HOSPADM

## 2024-04-11 RX ORDER — ACETAMINOPHEN 325 MG/1
650 TABLET ORAL EVERY 4 HOURS PRN
Status: DISCONTINUED | OUTPATIENT
Start: 2024-04-11 | End: 2024-04-11 | Stop reason: ALTCHOICE

## 2024-04-11 RX ORDER — FLECAINIDE ACETATE 50 MG/1
100 TABLET ORAL 2 TIMES DAILY
Status: DISCONTINUED | OUTPATIENT
Start: 2024-04-11 | End: 2024-04-11 | Stop reason: HOSPADM

## 2024-04-11 RX ORDER — SODIUM CHLORIDE 9 MG/ML
INJECTION, SOLUTION INTRAVENOUS PRN
Status: DISCONTINUED | OUTPATIENT
Start: 2024-04-11 | End: 2024-04-11 | Stop reason: HOSPADM

## 2024-04-11 RX ORDER — ALBUTEROL SULFATE 90 UG/1
2 AEROSOL, METERED RESPIRATORY (INHALATION) 4 TIMES DAILY PRN
Status: DISCONTINUED | OUTPATIENT
Start: 2024-04-11 | End: 2024-04-11 | Stop reason: HOSPADM

## 2024-04-11 RX ORDER — PIOGLITAZONEHYDROCHLORIDE 15 MG/1
30 TABLET ORAL DAILY
Status: DISCONTINUED | OUTPATIENT
Start: 2024-04-11 | End: 2024-04-11 | Stop reason: HOSPADM

## 2024-04-11 RX ORDER — SODIUM CHLORIDE 0.9 % (FLUSH) 0.9 %
5-40 SYRINGE (ML) INJECTION EVERY 12 HOURS SCHEDULED
Status: DISCONTINUED | OUTPATIENT
Start: 2024-04-11 | End: 2024-04-11 | Stop reason: HOSPADM

## 2024-04-11 RX ORDER — MELOXICAM 7.5 MG/1
15 TABLET ORAL DAILY
Status: DISCONTINUED | OUTPATIENT
Start: 2024-04-11 | End: 2024-04-11 | Stop reason: HOSPADM

## 2024-04-11 RX ORDER — METOPROLOL SUCCINATE 50 MG/1
50 TABLET, EXTENDED RELEASE ORAL DAILY
Status: DISCONTINUED | OUTPATIENT
Start: 2024-04-11 | End: 2024-04-11 | Stop reason: HOSPADM

## 2024-04-11 RX ORDER — DILTIAZEM HYDROCHLORIDE 120 MG/1
120 CAPSULE, COATED, EXTENDED RELEASE ORAL DAILY
Status: DISCONTINUED | OUTPATIENT
Start: 2024-04-11 | End: 2024-04-11 | Stop reason: HOSPADM

## 2024-04-11 RX ADMIN — FLECAINIDE ACETATE 100 MG: 50 TABLET ORAL at 08:47

## 2024-04-11 RX ADMIN — PIOGLITAZONE 30 MG: 15 TABLET ORAL at 08:46

## 2024-04-11 RX ADMIN — LEVOTHYROXINE SODIUM 150 MCG: 0.07 TABLET ORAL at 08:46

## 2024-04-11 RX ADMIN — SODIUM CHLORIDE, PRESERVATIVE FREE 10 ML: 5 INJECTION INTRAVENOUS at 08:49

## 2024-04-11 RX ADMIN — PANTOPRAZOLE SODIUM 40 MG: 40 TABLET, DELAYED RELEASE ORAL at 07:32

## 2024-04-11 RX ADMIN — SODIUM CHLORIDE, PRESERVATIVE FREE 10 ML: 5 INJECTION INTRAVENOUS at 08:47

## 2024-04-11 RX ADMIN — METOPROLOL SUCCINATE 50 MG: 50 TABLET, EXTENDED RELEASE ORAL at 08:46

## 2024-04-11 RX ADMIN — MELOXICAM 15 MG: 7.5 TABLET ORAL at 08:47

## 2024-04-11 RX ADMIN — APIXABAN 5 MG: 5 TABLET, FILM COATED ORAL at 08:46

## 2024-04-11 RX ADMIN — DILTIAZEM HYDROCHLORIDE 120 MG: 120 CAPSULE, EXTENDED RELEASE ORAL at 08:46

## 2024-04-11 ASSESSMENT — PAIN SCALES - GENERAL
PAINLEVEL_OUTOF10: 0

## 2024-04-11 NOTE — PROGRESS NOTES
Pt taken to lobby for discharge w RN via wheelchair. All discharge questions answered.     Electronically signed by Marleen Rowland RN on 4/11/2024 at 7:52 PM

## 2024-04-11 NOTE — PLAN OF CARE
Problem: Chronic Conditions and Co-morbidities  Goal: Patient's chronic conditions and co-morbidity symptoms are monitored and maintained or improved  Outcome: Progressing     Problem: Discharge Planning  Goal: Discharge to home or other facility with appropriate resources  Outcome: Progressing     Problem: Skin/Tissue Integrity  Goal: Absence of new skin breakdown  Description: 1.  Monitor for areas of redness and/or skin breakdown  2.  Assess vascular access sites hourly  3.  Every 4-6 hours minimum:  Change oxygen saturation probe site  4.  Every 4-6 hours:  If on nasal continuous positive airway pressure, respiratory therapy assess nares and determine need for appliance change or resting period.  Outcome: Progressing     Problem: Safety - Adult  Goal: Free from fall injury  Outcome: Progressing

## 2024-04-11 NOTE — PLAN OF CARE
Problem: Chronic Conditions and Co-morbidities  Goal: Patient's chronic conditions and co-morbidity symptoms are monitored and maintained or improved  Outcome: Completed     Problem: Discharge Planning  Goal: Discharge to home or other facility with appropriate resources  Outcome: Completed     Problem: Skin/Tissue Integrity  Goal: Absence of new skin breakdown  Description: 1.  Monitor for areas of redness and/or skin breakdown  2.  Assess vascular access sites hourly  3.  Every 4-6 hours minimum:  Change oxygen saturation probe site  4.  Every 4-6 hours:  If on nasal continuous positive airway pressure, respiratory therapy assess nares and determine need for appliance change or resting period.  Outcome: Completed     Problem: Safety - Adult  Goal: Free from fall injury  Outcome: Completed     Problem: Pain  Goal: Verbalizes/displays adequate comfort level or baseline comfort level  Outcome: Completed

## 2024-04-11 NOTE — PROGRESS NOTES
Spoke with ENT on call. Explained situation about pt waiting all day to see ENT for drainage from left ear after ablation yesterday. On call MD stated that if pt does not want to wait, she can call the office tomorrow and be seen this week. PM Charge RN and pt's PM RN aware.

## 2024-04-11 NOTE — CARE COORDINATION
04/11/24 0822   IMM Letter   Observation Status Letter date given: 04/11/24   Observation Status Letter time given: 0820  (copy made for hard chart)   Observation Status Letter given to Patient/Family/Significant other/Guardian/POA/by: Nikki Brown RN CM

## 2024-04-11 NOTE — DISCHARGE SUMMARY
Breath  Patient not taking: Reported on 4/10/2024  Ousmane Villegas MD   meloxicam (MOBIC) 15 MG tablet Take 1 tablet by mouth daily  Chari Camejo MD   pioglitazone (ACTOS) 30 MG tablet Take 1 tablet by mouth daily  Chari Camejo MD   pantoprazole (PROTONIX) 40 MG tablet Take 1 tablet by mouth daily  ProviderChari MD        Problem List:  Patient Active Problem List   Diagnosis    A-fib (HCC)    Rapid atrial fibrillation (HCC)    Heart failure with preserved ejection fraction (HCC)    SOB (shortness of breath)    AF (paroxysmal atrial fibrillation) (HCC)        Assessment & Plan:    Atrial Fibrillation/Atrial Flutter  S/p Aflutter ablation 1/2024 , Afib ablation 4/10/2024  Currently in NSR  Continue  with Flecainide, Cardizem and Metoprolol  Continue with Eliquis 5 mg BID  Protonix x 30 days     HTN  Hypertensive this morning at 155/63  Goal < 130/80  Continue Metoprolol  Start Lasix 40 mg daily   Encourage low sodium diet   Daily weights     Bleeding from left ear canal  Controlled- no bleeding since yesterday   ENT consult  Continue with Eliquis s/p ablation     Plan:  Eliquis 5 mg BID  2. PPI for 30 days- Protonix  3. Flecainide for 3 months   4. Continue with Cardizem and Metoprolol   5. Start lasix 40 mg daily- daily weights    6. ENT consult for left ear bleeding post ablation   7. Ok to remove dressing tomorrow, keep site clean and dry. Wash with soap and water  8. Schedule sleep study       Overall the patient is stable for discharge from a CV standpoint. All pertinent information and plan of care discussed with the physician.    Diet & Exercise:  The patient is counseled to follow a low salt diet to assure blood pressure remains controlled for cardiovascular risk factor modification  The patient is counseled to avoid excess caffeine, and energy drinks as this may exacerbated ectopy and arrhythmia  The patient is counseled to lose weight to control cardiovascular risk

## 2024-04-11 NOTE — PROGRESS NOTES
Patient went to sinus tachycardia at 0400. Rate at 100-150's; came back to sinus rhythm. BP stable. Pt is asymptomatic.

## 2024-04-11 NOTE — PROGRESS NOTES
04/11/24 0801   RT Protocol   History Pulmonary Disease 0   Respiratory pattern 0   Breath sounds 0   Cough 0   Bronchodilator Assessment Score 0

## 2024-04-11 NOTE — RT PROTOCOL NOTE
RT Nebulizer Bronchodilator Protocol Note    There is a bronchodilator order in the chart from a provider indicating to follow the RT Bronchodilator Protocol and there is an “Initiate RT Bronchodilator Protocol” order as well (see protocol at bottom of note).    CXR Findings:  No results found.    The findings from the last RT Protocol Assessment were as follows:  Smoking: None or smoker <15 pack years  Respiratory Pattern: Regular pattern and RR 12-20 bpm  Breath Sounds: Clear breath sounds  Cough: Strong, spontaneous, non-productive  Indication for Bronchodilator Therapy:    Bronchodilator Assessment Score: 0    Aerosolized bronchodilator medication orders have been revised according to the RT Nebulizer Bronchodilator Protocol below.    Respiratory Therapist to perform RT Therapy Protocol Assessment initially then follow the protocol.  Repeat RT Therapy Protocol Assessment PRN for score 0-3 or on second treatment, BID, and PRN for scores above 3.    No Indications - adjust the frequency to every 6 hours PRN wheezing or bronchospasm, if no treatments needed after 48 hours then discontinue using Per Protocol order mode.     If indication present, adjust the RT bronchodilator orders based on the Bronchodilator Assessment Score as indicated below.  If a patient is on this medication at home then do not decrease Frequency below that used at home.    0-3 - enter or revise RT bronchodilator order(s) to equivalent RT Bronchodilator order with Frequency of every 4 hours PRN for wheezing or increased work of breathing using Per Protocol order mode.       4-6 - enter or revise RT Bronchodilator order(s) to two equivalent RT bronchodilator orders with one order with BID Frequency and one order with Frequency of every 4 hours PRN wheezing or increased work of breathing using Per Protocol order mode.         7-10 - enter or revise RT Bronchodilator order(s) to two equivalent RT bronchodilator orders with one order with TID

## 2024-04-11 NOTE — PROGRESS NOTES
Late entry:    Admitted in CVU Bed 2916. Attached to cardiac telemetry monitoring. Relatives at bedside aware of POC. Patient teaching provided to keep right groin straight. Placed comfortably on bed with pillow support. No bleeding, site is soft, no hematoma and bruising at right groin noted; D-stat noted. Call light placed within reach. Reoriented to room and hospital policy.

## 2024-04-11 NOTE — TELEPHONE ENCOUNTER
Post Ablation Follow up Phone Calls    Date of Procedure: 4/10/24    Procedure: AF ablation    Date of Call: 4/11/24    Catheter Ablation  Sore throat or difficulty swallowing? (This is normal for a couple of days post procedure - if it continues, we want to know).    Ongoing CP? (This is normal for a couple of days post procedure - if it continues, we want to know)    Ongoing SOB? Any s/s of heart failure? Swelling in feet ankles or abdomen? Unable to lie flat in bed? SOB with exertion or all the time?    How does the groin look? Any swelling, bleeding or drainage? (A small lump is ok.)    Remind no heavy lifting >10# for 7 days post procedure.     Make sure they miss NO dose of blood thinners if they are on one.    Make sure they filled any medications that were prescribed (Protonix, Lasix, flecainide etc.)    Remind of follow up appointment.    Denies SOB / LAYNE, chest discomfort, sore throat, swallowing difficulties, fever / chills, orthopnea, or swelling since the ablation.  Reports groin is soft without swelling or drainage.  Reports compliance of meds, including OAC.  Reminded of lifting restriction, confirmed f/u OV with NP.

## 2024-04-12 ENCOUNTER — TELEPHONE (OUTPATIENT)
Dept: ENT CLINIC | Age: 69
End: 2024-04-12

## 2024-04-12 ENCOUNTER — TELEPHONE (OUTPATIENT)
Dept: CARDIOLOGY CLINIC | Age: 69
End: 2024-04-12

## 2024-04-12 ENCOUNTER — OFFICE VISIT (OUTPATIENT)
Dept: ENT CLINIC | Age: 69
End: 2024-04-12
Payer: MEDICARE

## 2024-04-12 VITALS
TEMPERATURE: 97.6 F | SYSTOLIC BLOOD PRESSURE: 133 MMHG | HEIGHT: 65 IN | HEART RATE: 67 BPM | DIASTOLIC BLOOD PRESSURE: 65 MMHG | OXYGEN SATURATION: 94 % | BODY MASS INDEX: 41.68 KG/M2

## 2024-04-12 DIAGNOSIS — S00.412A ABRASION OF LEFT EAR CANAL, INITIAL ENCOUNTER: Primary | ICD-10-CM

## 2024-04-12 DIAGNOSIS — H92.22 OTORRHAGIA OF LEFT EAR: ICD-10-CM

## 2024-04-12 PROCEDURE — 1036F TOBACCO NON-USER: CPT | Performed by: OTOLARYNGOLOGY

## 2024-04-12 PROCEDURE — 99203 OFFICE O/P NEW LOW 30 MIN: CPT | Performed by: OTOLARYNGOLOGY

## 2024-04-12 PROCEDURE — 3017F COLORECTAL CA SCREEN DOC REV: CPT | Performed by: OTOLARYNGOLOGY

## 2024-04-12 PROCEDURE — G8417 CALC BMI ABV UP PARAM F/U: HCPCS | Performed by: OTOLARYNGOLOGY

## 2024-04-12 PROCEDURE — G8427 DOCREV CUR MEDS BY ELIG CLIN: HCPCS | Performed by: OTOLARYNGOLOGY

## 2024-04-12 PROCEDURE — 1123F ACP DISCUSS/DSCN MKR DOCD: CPT | Performed by: OTOLARYNGOLOGY

## 2024-04-12 PROCEDURE — 1090F PRES/ABSN URINE INCON ASSESS: CPT | Performed by: OTOLARYNGOLOGY

## 2024-04-12 PROCEDURE — G8400 PT W/DXA NO RESULTS DOC: HCPCS | Performed by: OTOLARYNGOLOGY

## 2024-04-12 RX ORDER — NEOMYCIN SULFATE, POLYMYXIN B SULFATE AND HYDROCORTISONE 10; 3.5; 1 MG/ML; MG/ML; [USP'U]/ML
4 SUSPENSION/ DROPS AURICULAR (OTIC) 3 TIMES DAILY
Qty: 10 ML | Refills: 0 | Status: SHIPPED | OUTPATIENT
Start: 2024-04-12 | End: 2024-04-22

## 2024-04-12 RX ORDER — MECLIZINE HYDROCHLORIDE 25 MG/1
25 TABLET ORAL 3 TIMES DAILY PRN
Qty: 15 TABLET | Refills: 0 | Status: SHIPPED | OUTPATIENT
Start: 2024-04-12 | End: 2024-04-22

## 2024-04-12 NOTE — TELEPHONE ENCOUNTER
Spoke with granddaughter, she states that while the pt was walking from her car to her MD's office, she became short of breath and dizzy.  No other symptoms noted. Pt had to use a wheel chair.    Granddaughter states pt's blood pressure this am was around 144/70, P 66 and O2 sat 92%.  Granddaughter is concerned and wanted you to be aware of what is going on. Please advise

## 2024-04-12 NOTE — TELEPHONE ENCOUNTER
Aixa called in for patient -- she was discharged from hospital yesterday and was informed to call our office to follow up. Please advise. Thank you.

## 2024-04-12 NOTE — PROGRESS NOTES
[facility]   DIVISION OF OTOLARYNGOLOGY- HEAD & NECK SURGERY  [facility]           NEW PATIENT VISIT      PCP:  Arias Michaud MD      REFERRED BY:   Bob Goodwin MD, Cardiologist.        CHIEF COMPLAINT    Chief Complaint   Patient presents with    Ear Problem     bleeding from the left ear        HISTORY OF PRESENT ILLNESS    Flori Bartholomew is a 68 y.o. female who was seen today for otorrhagia.  Underwent ablation left side of heart at [facility].  While on the operating table, her left ear started hurting.  \"I put finger in the ear and then blood came out.  They wiped it and said 'we can't do anything.'  It stopped bleeding Wednesday at 8 pm.  Still feels full of something.  Sounds like I'm in a tunnel.  I feel something when I chew.\"  No treatment was rendered.  Has had no ear problems in the past including no ear infections.  On Eloquis BID, but did not take it wednesday and skipped only one dose.  Started back on last night and took one this morning.       REVIEW OF SYSTEMS   Review of Systems   Constitutional:  Negative for chills and fever.   HENT:  Negative for congestion, ear discharge, ear pain, rhinorrhea, sinus pain, sore throat and tinnitus.          PAST MEDICAL HISTORY    Past Medical History:   Diagnosis Date    Depression     Diabetes mellitus (HCC)     GERD (gastroesophageal reflux disease)     Hypertension     Paroxysmal atrial fibrillation (HCC)     Thyroid disease     Typical atrial flutter (HCC)        Past Surgical History:   Procedure Laterality Date    CARDIAC ELECTROPHYSIOLOGY MAPPING AND ABLATION  01/12/2024    Typical atrial flutter    CHOLECYSTECTOMY      TOTAL KNEE ARTHROPLASTY           No current facility-administered medications for this visit.     No current outpatient medications on file.     Facility-Administered Medications Ordered in Other Visits   Medication Dose Route Frequency Provider Last Rate Last Admin    sodium chloride

## 2024-04-12 NOTE — TELEPHONE ENCOUNTER
Pt's granddaughter, Aixa called to report patient had ablation performed on 4/10 and was released yesterday. She has been experiencing episodes of dizziness and SOB when moving about. While sitting/lying down she feels fine. Aixa phone number [phone]      Dizziness/Syncope:    When did your symptoms start? Yesterday    Are you having chest pain, shortness of breath, vomiting, or diarrhea? SOB, dizziness during exertion    Is your heart racing? No    Are you taking any new medications? Lasix 40mg     Have you, or do you feel like you will pass out? No, just gets dizzy and has to sit down     Have you ever had this before? No

## 2024-04-18 ENCOUNTER — OFFICE VISIT (OUTPATIENT)
Dept: CARDIOLOGY CLINIC | Age: 69
End: 2024-04-18
Payer: MEDICARE

## 2024-04-18 ENCOUNTER — OFFICE VISIT (OUTPATIENT)
Dept: ENT CLINIC | Age: 69
End: 2024-04-18
Payer: MEDICARE

## 2024-04-18 VITALS
BODY MASS INDEX: 43.32 KG/M2 | OXYGEN SATURATION: 94 % | TEMPERATURE: 97.8 F | DIASTOLIC BLOOD PRESSURE: 82 MMHG | HEIGHT: 65 IN | HEART RATE: 92 BPM | SYSTOLIC BLOOD PRESSURE: 153 MMHG | WEIGHT: 260 LBS

## 2024-04-18 VITALS
DIASTOLIC BLOOD PRESSURE: 78 MMHG | WEIGHT: 260 LBS | HEART RATE: 87 BPM | BODY MASS INDEX: 43.27 KG/M2 | SYSTOLIC BLOOD PRESSURE: 134 MMHG

## 2024-04-18 DIAGNOSIS — N39.0 URINARY TRACT INFECTION WITHOUT HEMATURIA, SITE UNSPECIFIED: ICD-10-CM

## 2024-04-18 DIAGNOSIS — E05.90 HYPERTHYROIDISM: ICD-10-CM

## 2024-04-18 DIAGNOSIS — S00.412A ABRASION OF LEFT EAR CANAL, INITIAL ENCOUNTER: Primary | ICD-10-CM

## 2024-04-18 DIAGNOSIS — Z79.899 ENCOUNTER FOR MONITORING FLECAINIDE THERAPY: ICD-10-CM

## 2024-04-18 DIAGNOSIS — I48.0 PAROXYSMAL ATRIAL FIBRILLATION (HCC): Primary | ICD-10-CM

## 2024-04-18 DIAGNOSIS — Z51.81 ENCOUNTER FOR MONITORING FLECAINIDE THERAPY: ICD-10-CM

## 2024-04-18 DIAGNOSIS — Z79.01 ON CONTINUOUS ORAL ANTICOAGULATION: ICD-10-CM

## 2024-04-18 DIAGNOSIS — I27.20 PULMONARY HTN (HCC): ICD-10-CM

## 2024-04-18 DIAGNOSIS — I49.1 PAC (PREMATURE ATRIAL CONTRACTION): ICD-10-CM

## 2024-04-18 DIAGNOSIS — I48.3 TYPICAL ATRIAL FLUTTER (HCC): ICD-10-CM

## 2024-04-18 LAB
BACTERIA URNS QL MICRO: ABNORMAL /HPF
BILIRUB UR QL STRIP.AUTO: NEGATIVE
CLARITY UR: CLEAR
COLOR UR: ABNORMAL
EPI CELLS #/AREA URNS AUTO: 3 /HPF (ref 0–5)
GLUCOSE UR STRIP.AUTO-MCNC: NEGATIVE MG/DL
HGB UR QL STRIP.AUTO: NEGATIVE
HYALINE CASTS #/AREA URNS AUTO: 0 /LPF (ref 0–8)
KETONES UR STRIP.AUTO-MCNC: NEGATIVE MG/DL
LEUKOCYTE ESTERASE UR QL STRIP.AUTO: ABNORMAL
MUCUS: PRESENT
NITRITE UR QL STRIP.AUTO: POSITIVE
PH UR STRIP.AUTO: 8 [PH] (ref 5–8)
PROT UR STRIP.AUTO-MCNC: 30 MG/DL
RBC CLUMPS #/AREA URNS AUTO: 7 /HPF (ref 0–4)
SP GR UR STRIP.AUTO: 1.03 (ref 1–1.03)
UA DIPSTICK W REFLEX MICRO PNL UR: YES
URN SPEC COLLECT METH UR: ABNORMAL
UROBILINOGEN UR STRIP-ACNC: 1 E.U./DL
WBC #/AREA URNS AUTO: 28 /HPF (ref 0–5)

## 2024-04-18 PROCEDURE — G8400 PT W/DXA NO RESULTS DOC: HCPCS | Performed by: NURSE PRACTITIONER

## 2024-04-18 PROCEDURE — G8417 CALC BMI ABV UP PARAM F/U: HCPCS | Performed by: OTOLARYNGOLOGY

## 2024-04-18 PROCEDURE — 99215 OFFICE O/P EST HI 40 MIN: CPT | Performed by: NURSE PRACTITIONER

## 2024-04-18 PROCEDURE — G8427 DOCREV CUR MEDS BY ELIG CLIN: HCPCS | Performed by: OTOLARYNGOLOGY

## 2024-04-18 PROCEDURE — 3017F COLORECTAL CA SCREEN DOC REV: CPT | Performed by: OTOLARYNGOLOGY

## 2024-04-18 PROCEDURE — 1090F PRES/ABSN URINE INCON ASSESS: CPT | Performed by: OTOLARYNGOLOGY

## 2024-04-18 PROCEDURE — G8400 PT W/DXA NO RESULTS DOC: HCPCS | Performed by: OTOLARYNGOLOGY

## 2024-04-18 PROCEDURE — 99213 OFFICE O/P EST LOW 20 MIN: CPT | Performed by: OTOLARYNGOLOGY

## 2024-04-18 PROCEDURE — 1123F ACP DISCUSS/DSCN MKR DOCD: CPT | Performed by: OTOLARYNGOLOGY

## 2024-04-18 PROCEDURE — 1090F PRES/ABSN URINE INCON ASSESS: CPT | Performed by: NURSE PRACTITIONER

## 2024-04-18 PROCEDURE — 1123F ACP DISCUSS/DSCN MKR DOCD: CPT | Performed by: NURSE PRACTITIONER

## 2024-04-18 PROCEDURE — 3017F COLORECTAL CA SCREEN DOC REV: CPT | Performed by: NURSE PRACTITIONER

## 2024-04-18 PROCEDURE — 1036F TOBACCO NON-USER: CPT | Performed by: OTOLARYNGOLOGY

## 2024-04-18 PROCEDURE — 1036F TOBACCO NON-USER: CPT | Performed by: NURSE PRACTITIONER

## 2024-04-18 PROCEDURE — G8417 CALC BMI ABV UP PARAM F/U: HCPCS | Performed by: NURSE PRACTITIONER

## 2024-04-18 PROCEDURE — 93000 ELECTROCARDIOGRAM COMPLETE: CPT | Performed by: NURSE PRACTITIONER

## 2024-04-18 PROCEDURE — G8427 DOCREV CUR MEDS BY ELIG CLIN: HCPCS | Performed by: NURSE PRACTITIONER

## 2024-04-18 RX ORDER — HYDROCHLOROTHIAZIDE 25 MG/1
25 TABLET ORAL DAILY
COMMUNITY

## 2024-04-18 RX ORDER — DILTIAZEM HYDROCHLORIDE 120 MG/1
120 CAPSULE, COATED, EXTENDED RELEASE ORAL DAILY
Qty: 90 CAPSULE | Refills: 3 | Status: SHIPPED | OUTPATIENT
Start: 2024-04-18

## 2024-04-18 RX ORDER — FUROSEMIDE 40 MG/1
20 TABLET ORAL DAILY
Qty: 60 TABLET | Refills: 3 | Status: SHIPPED | OUTPATIENT
Start: 2024-04-18

## 2024-04-18 NOTE — PATIENT INSTRUCTIONS
Irrigate left ear canal with half strength hydrogen peroxide twice a day for 21 days.    Then return for recheck and ear cleaning.

## 2024-04-18 NOTE — PROGRESS NOTES
CHIEF COMPLAINT  Chief Complaint   Patient presents with    Follow-up     Abrasion of left ear canal wall.       HISTORY OF PRESENT ILLNESS    Flori Bartholomew is a 68 y.o. female here for recheck and follow up of abrasion of left ear canal.  Patient stated that she has had no further bleeding from the ear.        REVIEW OF SYSTEMS  Constitutional:  Denied fever and chills.  ENT/sinus:  Denied otalgia, otorrhea, nasal pain, rhinorrhea, sore throat, and sinus/facial pain.        EXAMINATION    WDWN, NAD  Ears:  A large blood clot was persistent on the left anterior inferior EAC extending from the site of the abrasion to and upon TM, hard, unable to remove without risk of bleeding or injury to TM.  No evidence of infection.The left mastoid and pinna were normal.  Left binocular otomicroscopy was performed.      I performed this head and neck physical exam personally.        IMPRESSION / DIAGNOSES / ORDERS:   Flori was seen today for follow-up.    Diagnoses and all orders for this visit:    Abrasion of left ear canal, initial encounter  Comments:  with clot overlying abrasion area.         RECOMMENDATIONS / PLAN:   See Patient Instructions on file for this visit.  Patient was advised to review and follow my instructions in the AVS, which we reviewed together, and to call and speak to the MA or LPN with any questions regarding these instructions.   Return in about 3 weeks (around 5/9/2024) for recheck/follow-up, and sooner if condition worsens.         Patient Instructions   Irrigate left ear canal with half strength hydrogen peroxide twice a day for 21 days.    Then return for recheck and ear cleaning.         Colin Khan MD    [facility]  Department of Otolaryngology/Head and Neck Surgery  [facility]  [address], [address]  [place] [address]  [phone]

## 2024-04-20 LAB
BACTERIA UR CULT: ABNORMAL
ORGANISM: ABNORMAL

## 2024-04-22 ENCOUNTER — OFFICE VISIT (OUTPATIENT)
Dept: CARDIOLOGY CLINIC | Age: 69
End: 2024-04-22
Payer: MEDICARE

## 2024-04-22 ENCOUNTER — TELEPHONE (OUTPATIENT)
Dept: CARDIOLOGY CLINIC | Age: 69
End: 2024-04-22

## 2024-04-22 VITALS
HEART RATE: 72 BPM | DIASTOLIC BLOOD PRESSURE: 64 MMHG | SYSTOLIC BLOOD PRESSURE: 136 MMHG | BODY MASS INDEX: 43.27 KG/M2 | WEIGHT: 260 LBS

## 2024-04-22 DIAGNOSIS — I48.3 TYPICAL ATRIAL FLUTTER (HCC): ICD-10-CM

## 2024-04-22 DIAGNOSIS — Z51.81 ENCOUNTER FOR MONITORING FLECAINIDE THERAPY: ICD-10-CM

## 2024-04-22 DIAGNOSIS — I50.32 CHRONIC HEART FAILURE WITH PRESERVED EJECTION FRACTION (HCC): ICD-10-CM

## 2024-04-22 DIAGNOSIS — I10 BENIGN ESSENTIAL HTN: ICD-10-CM

## 2024-04-22 DIAGNOSIS — I27.20 PULMONARY HTN (HCC): ICD-10-CM

## 2024-04-22 DIAGNOSIS — I48.0 PAROXYSMAL ATRIAL FIBRILLATION (HCC): Primary | ICD-10-CM

## 2024-04-22 DIAGNOSIS — Z79.899 ENCOUNTER FOR MONITORING FLECAINIDE THERAPY: ICD-10-CM

## 2024-04-22 DIAGNOSIS — Z79.01 ON CONTINUOUS ORAL ANTICOAGULATION: ICD-10-CM

## 2024-04-22 PROCEDURE — G8427 DOCREV CUR MEDS BY ELIG CLIN: HCPCS

## 2024-04-22 PROCEDURE — 93000 ELECTROCARDIOGRAM COMPLETE: CPT

## 2024-04-22 PROCEDURE — G8417 CALC BMI ABV UP PARAM F/U: HCPCS

## 2024-04-22 PROCEDURE — 1036F TOBACCO NON-USER: CPT

## 2024-04-22 PROCEDURE — 1090F PRES/ABSN URINE INCON ASSESS: CPT

## 2024-04-22 PROCEDURE — 3078F DIAST BP <80 MM HG: CPT

## 2024-04-22 PROCEDURE — 1123F ACP DISCUSS/DSCN MKR DOCD: CPT

## 2024-04-22 PROCEDURE — 3017F COLORECTAL CA SCREEN DOC REV: CPT

## 2024-04-22 PROCEDURE — 3075F SYST BP GE 130 - 139MM HG: CPT

## 2024-04-22 PROCEDURE — 99215 OFFICE O/P EST HI 40 MIN: CPT

## 2024-04-22 PROCEDURE — G8400 PT W/DXA NO RESULTS DOC: HCPCS

## 2024-04-22 RX ORDER — NITROFURANTOIN 25; 75 MG/1; MG/1
100 CAPSULE ORAL 2 TIMES DAILY
COMMUNITY
Start: 2024-04-19 | End: 2024-04-24

## 2024-04-22 NOTE — TELEPHONE ENCOUNTER
[facility] called regarding this patients medication. States patient is prescribed Cipro 500mg & Tambocor 100mg and the pharmacist is worried about a reaction between the two medications. [facility] Would like a call back [phone] to discuss this.     Pharmacy  [facility] [id number] - [place] - [address] [place] - P [phone] - F [phone]  [address] [place] [address]  Phone: [phone]  Fax: [phone]

## 2024-04-22 NOTE — PROGRESS NOTES
\"TROPONINI\"  No results found for: \"BNP\"  Lab Results   Component Value Date/Time    PROTIME 13.2 04/03/2024 08:18 AM    PROTIME 12.9 01/09/2024 04:17 AM    PROTIME 12.8 01/08/2024 08:31 PM    INR 0.98 04/03/2024 08:18 AM    INR 0.97 01/09/2024 04:17 AM    INR 0.96 01/08/2024 08:31 PM     No results found for: \"CHOL\", \"HDL\", \"TRIG\"    ECG: Personally reviewed: NSR, HR 72, , , QTc 447    ECHO: 1/9/2024   Summary   Normal left ventricle size, wall thickness, and systolic function with an   estimated ejection fraction of 55-60%. No regional wall motion abnormalities   are seen.   Normal diastolic function.   The right ventricle is normal in size and function.   Moderate tricuspid regurgitation.   Systolic pulmonary artery pressure (SPAP) estimated at 75 mmHg (RA pressure   15 mmHg), consistent with severe pulmonary hypertension.   IVC size is dilated (>2.1 cm) and collapses < 50% with respiration.    Stress Test: 3/6/2021  Negative for ischemia    Cardiac Angiography: n/a    Problem List:   Patient Active Problem List    Diagnosis Date Noted    AF (paroxysmal atrial fibrillation) ([facility]) 04/10/2024    Heart failure with preserved ejection fraction (HCC) 01/09/2024    SOB (shortness of breath) 01/09/2024    A-fib ([facility]) 01/08/2024    Rapid atrial fibrillation (HCC) 01/08/2024        Assessment:   1. Paroxysmal atrial fibrillation (HCC)    2. Typical atrial flutter (HCC)    3. Encounter for monitoring flecainide therapy    4. On continuous oral anticoagulation    5. Benign essential HTN    6. Pulmonary HTN (HCC)    7. Chronic heart failure with preserved ejection fraction (HCC)        Cardiac HX: Flori Bartholomew is a 68 y.o. woman with a h/o HTN, DM, GERD, depression, hypothyroidism, morbid obesity, p/w SOB, cough noted to be in AF/RVR, placed on dilt gtt, converted to NSR, recurrent AF, dilt gtt restarted, switched to PO, Echo (1/9/2024) EF 55-60%, mod TR, severe pulm HTN, LAE 4.1/65.4, AF organized into

## 2024-04-22 NOTE — TELEPHONE ENCOUNTER
Please advise regarding pharmacist's concern for Cipro with flecainide. Pt is actually seeing you this afternoon for LAYNE, s/p AF ablation 4/10/24.

## 2024-04-24 PROBLEM — E11.9 TYPE 2 DIABETES MELLITUS WITHOUT COMPLICATION, WITHOUT LONG-TERM CURRENT USE OF INSULIN (HCC): Status: ACTIVE | Noted: 2023-04-06

## 2024-04-24 PROBLEM — I50.30 HEART FAILURE WITH PRESERVED EJECTION FRACTION (HCC): Chronic | Status: ACTIVE | Noted: 2024-01-09

## 2024-04-24 PROBLEM — E66.01 MORBID OBESITY (HCC): Chronic | Status: ACTIVE | Noted: 2018-03-07

## 2024-04-24 PROBLEM — I48.91 A-FIB (HCC): Chronic | Status: ACTIVE | Noted: 2024-01-08

## 2024-04-24 PROBLEM — I27.20 PULMONARY HTN (HCC): Chronic | Status: ACTIVE | Noted: 2024-04-24

## 2024-04-24 PROBLEM — E11.9 TYPE 2 DIABETES MELLITUS WITHOUT COMPLICATION, WITHOUT LONG-TERM CURRENT USE OF INSULIN (HCC): Chronic | Status: ACTIVE | Noted: 2023-04-06

## 2024-04-27 ENCOUNTER — HOSPITAL ENCOUNTER (EMERGENCY)
Age: 69
Discharge: ANOTHER HOSPITAL | End: 2024-04-27
Attending: EMERGENCY MEDICINE
Payer: MEDICARE

## 2024-04-27 ENCOUNTER — APPOINTMENT (OUTPATIENT)
Dept: GENERAL RADIOLOGY | Age: 69
End: 2024-04-27
Payer: MEDICARE

## 2024-04-27 ENCOUNTER — APPOINTMENT (OUTPATIENT)
Dept: CT IMAGING | Age: 69
End: 2024-04-27
Payer: MEDICARE

## 2024-04-27 ENCOUNTER — HOSPITAL ENCOUNTER (INPATIENT)
Age: 69
LOS: 6 days | Discharge: HOME | End: 2024-05-03
Attending: INTERNAL MEDICINE | Admitting: INTERNAL MEDICINE
Payer: MEDICARE

## 2024-04-27 VITALS
TEMPERATURE: 97.4 F | WEIGHT: 251.32 LBS | HEIGHT: 65 IN | DIASTOLIC BLOOD PRESSURE: 52 MMHG | HEART RATE: 82 BPM | OXYGEN SATURATION: 92 % | BODY MASS INDEX: 41.87 KG/M2 | RESPIRATION RATE: 29 BRPM | SYSTOLIC BLOOD PRESSURE: 136 MMHG

## 2024-04-27 DIAGNOSIS — R06.02 SHORTNESS OF BREATH: ICD-10-CM

## 2024-04-27 DIAGNOSIS — I50.31 ACUTE HEART FAILURE WITH PRESERVED EJECTION FRACTION (HCC): Chronic | ICD-10-CM

## 2024-04-27 DIAGNOSIS — R42 LIGHTHEADEDNESS: Primary | ICD-10-CM

## 2024-04-27 DIAGNOSIS — I50.32 CHRONIC HEART FAILURE WITH PRESERVED EJECTION FRACTION (HCC): Primary | Chronic | ICD-10-CM

## 2024-04-27 DIAGNOSIS — E03.9 HYPOTHYROIDISM, UNSPECIFIED TYPE: ICD-10-CM

## 2024-04-27 PROBLEM — R06.00 DYSPNEA: Status: ACTIVE | Noted: 2024-04-27

## 2024-04-27 LAB
ALBUMIN SERPL-MCNC: 4.2 G/DL (ref 3.4–5)
ALBUMIN/GLOB SERPL: 1.3 {RATIO} (ref 1.1–2.2)
ALP SERPL-CCNC: 104 U/L (ref 40–129)
ALT SERPL-CCNC: 24 U/L (ref 10–40)
ANION GAP SERPL CALCULATED.3IONS-SCNC: 12 MMOL/L (ref 3–16)
AST SERPL-CCNC: 27 U/L (ref 15–37)
BACTERIA URNS QL MICRO: ABNORMAL /HPF
BASOPHILS # BLD: 0 K/UL (ref 0–0.2)
BASOPHILS NFR BLD: 0.5 %
BILIRUB SERPL-MCNC: 0.4 MG/DL (ref 0–1)
BILIRUB UR QL STRIP.AUTO: ABNORMAL
BUN SERPL-MCNC: 19 MG/DL (ref 7–20)
CALCIUM SERPL-MCNC: 9.4 MG/DL (ref 8.3–10.6)
CHARACTER UR: ABNORMAL
CHLORIDE SERPL-SCNC: 91 MMOL/L (ref 99–110)
CLARITY UR: CLEAR
CO2 SERPL-SCNC: 33 MMOL/L (ref 21–32)
COLOR UR: YELLOW
CREAT SERPL-MCNC: 1 MG/DL (ref 0.6–1.2)
DEPRECATED RDW RBC AUTO: 13.7 % (ref 12.4–15.4)
EOSINOPHIL # BLD: 0.1 K/UL (ref 0–0.6)
EOSINOPHIL NFR BLD: 1.3 %
EPI CELLS #/AREA URNS HPF: ABNORMAL /HPF (ref 0–5)
FLUAV RNA UPPER RESP QL NAA+PROBE: NEGATIVE
FLUBV AG NPH QL: NEGATIVE
GFR SERPLBLD CREATININE-BSD FMLA CKD-EPI: 61 ML/MIN/{1.73_M2}
GLUCOSE SERPL-MCNC: 160 MG/DL (ref 70–99)
GLUCOSE UR STRIP.AUTO-MCNC: NEGATIVE MG/DL
HCT VFR BLD AUTO: 41.3 % (ref 36–48)
HGB BLD-MCNC: 13.2 G/DL (ref 12–16)
HGB UR QL STRIP.AUTO: NEGATIVE
HYALINE CASTS #/AREA URNS LPF: ABNORMAL /LPF (ref 0–2)
IMM GRANULOCYTES # BLD: 0 K/UL (ref 0–0.2)
IMM GRANULOCYTES NFR BLD: 0.3 %
KETONES UR STRIP.AUTO-MCNC: NEGATIVE MG/DL
LEUKOCYTE ESTERASE UR QL STRIP.AUTO: NEGATIVE
LYMPHOCYTES # BLD: 1.5 K/UL (ref 1–5.1)
LYMPHOCYTES NFR BLD: 19.1 %
MAGNESIUM SERPL-MCNC: 2 MG/DL (ref 1.8–2.4)
MCH RBC QN AUTO: 30.5 PG (ref 26–34)
MCHC RBC AUTO-ENTMCNC: 32 G/DL (ref 32–36.4)
MCV RBC AUTO: 95.4 FL (ref 80–100)
MONOCYTES # BLD: 0.9 K/UL (ref 0–1.3)
MONOCYTES NFR BLD: 11.2 %
MUCOUS THREADS #/AREA URNS LPF: ABNORMAL /LPF
NEUTROPHILS # BLD: 5.1 K/UL (ref 1.7–7.7)
NEUTROPHILS NFR BLD: 67.6 %
NITRITE UR QL STRIP.AUTO: NEGATIVE
NT-PROBNP SERPL-MCNC: 50 PG/ML (ref 0–124)
PH UR STRIP.AUTO: 5.5 [PH] (ref 5–8)
PLATELET # BLD AUTO: 212 K/UL (ref 135–450)
PMV BLD AUTO: 10.7 FL (ref 5–10.5)
POTASSIUM SERPL-SCNC: 3.1 MMOL/L (ref 3.5–5.1)
PROT SERPL-MCNC: 7.4 G/DL (ref 6.4–8.2)
PROT UR STRIP.AUTO-MCNC: 30 MG/DL
RBC # BLD AUTO: 4.33 M/UL (ref 4–5.2)
RBC #/AREA URNS HPF: ABNORMAL /HPF (ref 0–4)
SARS-COV-2 RDRP RESP QL NAA+PROBE: NOT DETECTED
SODIUM SERPL-SCNC: 136 MMOL/L (ref 136–145)
SP GR UR STRIP.AUTO: >=1.03 (ref 1–1.03)
TROPONIN, HIGH SENSITIVITY: 14 NG/L (ref 0–14)
TROPONIN, HIGH SENSITIVITY: 14 NG/L (ref 0–14)
UA COMPLETE W REFLEX CULTURE PNL UR: ABNORMAL
UA DIPSTICK W REFLEX MICRO PNL UR: YES
URN SPEC COLLECT METH UR: ABNORMAL
UROBILINOGEN UR STRIP-ACNC: 1 E.U./DL
WBC # BLD AUTO: 7.6 K/UL (ref 4–11)
WBC #/AREA URNS HPF: ABNORMAL /HPF (ref 0–5)

## 2024-04-27 PROCEDURE — 6360000004 HC RX CONTRAST MEDICATION: Performed by: EMERGENCY MEDICINE

## 2024-04-27 PROCEDURE — 71260 CT THORAX DX C+: CPT

## 2024-04-27 PROCEDURE — 80053 COMPREHEN METABOLIC PANEL: CPT

## 2024-04-27 PROCEDURE — 2060000000 HC ICU INTERMEDIATE R&B

## 2024-04-27 PROCEDURE — 85025 COMPLETE CBC W/AUTO DIFF WBC: CPT

## 2024-04-27 PROCEDURE — 84484 ASSAY OF TROPONIN QUANT: CPT

## 2024-04-27 PROCEDURE — 99285 EMERGENCY DEPT VISIT HI MDM: CPT

## 2024-04-27 PROCEDURE — 36415 COLL VENOUS BLD VENIPUNCTURE: CPT

## 2024-04-27 PROCEDURE — 71045 X-RAY EXAM CHEST 1 VIEW: CPT

## 2024-04-27 PROCEDURE — 87635 SARS-COV-2 COVID-19 AMP PRB: CPT

## 2024-04-27 PROCEDURE — 81001 URINALYSIS AUTO W/SCOPE: CPT

## 2024-04-27 PROCEDURE — 2580000003 HC RX 258: Performed by: EMERGENCY MEDICINE

## 2024-04-27 PROCEDURE — 83880 ASSAY OF NATRIURETIC PEPTIDE: CPT

## 2024-04-27 PROCEDURE — 83735 ASSAY OF MAGNESIUM: CPT

## 2024-04-27 PROCEDURE — 87804 INFLUENZA ASSAY W/OPTIC: CPT

## 2024-04-27 RX ORDER — INSULIN LISPRO 100 [IU]/ML
0-4 INJECTION, SOLUTION INTRAVENOUS; SUBCUTANEOUS NIGHTLY
Status: DISCONTINUED | OUTPATIENT
Start: 2024-04-28 | End: 2024-05-03 | Stop reason: HOSPADM

## 2024-04-27 RX ORDER — SERTRALINE HYDROCHLORIDE 100 MG/1
100 TABLET, FILM COATED ORAL DAILY
Status: DISCONTINUED | OUTPATIENT
Start: 2024-04-28 | End: 2024-05-03 | Stop reason: HOSPADM

## 2024-04-27 RX ORDER — DILTIAZEM HYDROCHLORIDE 120 MG/1
120 CAPSULE, COATED, EXTENDED RELEASE ORAL DAILY
Status: DISCONTINUED | OUTPATIENT
Start: 2024-04-28 | End: 2024-05-03 | Stop reason: HOSPADM

## 2024-04-27 RX ORDER — INSULIN LISPRO 100 [IU]/ML
0-4 INJECTION, SOLUTION INTRAVENOUS; SUBCUTANEOUS
Status: DISCONTINUED | OUTPATIENT
Start: 2024-04-28 | End: 2024-05-03 | Stop reason: HOSPADM

## 2024-04-27 RX ORDER — DEXTROSE MONOHYDRATE 100 MG/ML
INJECTION, SOLUTION INTRAVENOUS CONTINUOUS PRN
Status: DISCONTINUED | OUTPATIENT
Start: 2024-04-27 | End: 2024-05-03 | Stop reason: HOSPADM

## 2024-04-27 RX ORDER — METOPROLOL SUCCINATE 50 MG/1
50 TABLET, EXTENDED RELEASE ORAL DAILY
Status: DISCONTINUED | OUTPATIENT
Start: 2024-04-28 | End: 2024-05-03 | Stop reason: HOSPADM

## 2024-04-27 RX ORDER — ONDANSETRON 2 MG/ML
4 INJECTION INTRAMUSCULAR; INTRAVENOUS EVERY 6 HOURS PRN
Status: DISCONTINUED | OUTPATIENT
Start: 2024-04-27 | End: 2024-04-27

## 2024-04-27 RX ORDER — PANTOPRAZOLE SODIUM 40 MG/1
40 TABLET, DELAYED RELEASE ORAL DAILY
Status: DISCONTINUED | OUTPATIENT
Start: 2024-04-28 | End: 2024-05-03 | Stop reason: HOSPADM

## 2024-04-27 RX ORDER — PIOGLITAZONEHYDROCHLORIDE 30 MG/1
30 TABLET ORAL DAILY
Status: DISCONTINUED | OUTPATIENT
Start: 2024-04-28 | End: 2024-04-29

## 2024-04-27 RX ORDER — HYDROCHLOROTHIAZIDE 25 MG/1
25 TABLET ORAL DAILY
Status: DISCONTINUED | OUTPATIENT
Start: 2024-04-28 | End: 2024-05-03 | Stop reason: HOSPADM

## 2024-04-27 RX ORDER — POTASSIUM CHLORIDE 20 MEQ/1
40 TABLET, EXTENDED RELEASE ORAL PRN
Status: DISCONTINUED | OUTPATIENT
Start: 2024-04-27 | End: 2024-05-03 | Stop reason: HOSPADM

## 2024-04-27 RX ORDER — ACETAMINOPHEN 650 MG/1
650 SUPPOSITORY RECTAL EVERY 6 HOURS PRN
Status: DISCONTINUED | OUTPATIENT
Start: 2024-04-27 | End: 2024-05-03 | Stop reason: HOSPADM

## 2024-04-27 RX ORDER — POTASSIUM CHLORIDE 7.45 MG/ML
10 INJECTION INTRAVENOUS PRN
Status: DISCONTINUED | OUTPATIENT
Start: 2024-04-27 | End: 2024-05-03 | Stop reason: HOSPADM

## 2024-04-27 RX ORDER — ONDANSETRON 4 MG/1
4 TABLET, ORALLY DISINTEGRATING ORAL EVERY 8 HOURS PRN
Status: DISCONTINUED | OUTPATIENT
Start: 2024-04-27 | End: 2024-04-27

## 2024-04-27 RX ORDER — MAGNESIUM SULFATE IN WATER 40 MG/ML
2000 INJECTION, SOLUTION INTRAVENOUS PRN
Status: DISCONTINUED | OUTPATIENT
Start: 2024-04-27 | End: 2024-05-03 | Stop reason: HOSPADM

## 2024-04-27 RX ORDER — SODIUM CHLORIDE 9 MG/ML
INJECTION, SOLUTION INTRAVENOUS PRN
Status: DISCONTINUED | OUTPATIENT
Start: 2024-04-27 | End: 2024-05-01 | Stop reason: SDUPTHER

## 2024-04-27 RX ORDER — LEVOTHYROXINE SODIUM 0.15 MG/1
150 TABLET ORAL DAILY
Status: DISCONTINUED | OUTPATIENT
Start: 2024-04-28 | End: 2024-05-03 | Stop reason: HOSPADM

## 2024-04-27 RX ORDER — SODIUM CHLORIDE 0.9 % (FLUSH) 0.9 %
5-40 SYRINGE (ML) INJECTION EVERY 12 HOURS SCHEDULED
Status: DISCONTINUED | OUTPATIENT
Start: 2024-04-28 | End: 2024-05-03 | Stop reason: HOSPADM

## 2024-04-27 RX ORDER — SERTRALINE HYDROCHLORIDE 100 MG/1
100 TABLET, FILM COATED ORAL DAILY
COMMUNITY

## 2024-04-27 RX ORDER — SODIUM CHLORIDE 0.9 % (FLUSH) 0.9 %
5-40 SYRINGE (ML) INJECTION PRN
Status: DISCONTINUED | OUTPATIENT
Start: 2024-04-27 | End: 2024-05-03 | Stop reason: HOSPADM

## 2024-04-27 RX ORDER — FLECAINIDE ACETATE 100 MG/1
100 TABLET ORAL 2 TIMES DAILY
Status: DISCONTINUED | OUTPATIENT
Start: 2024-04-28 | End: 2024-05-02

## 2024-04-27 RX ORDER — POLYETHYLENE GLYCOL 3350 17 G/17G
17 POWDER, FOR SOLUTION ORAL DAILY PRN
Status: DISCONTINUED | OUTPATIENT
Start: 2024-04-27 | End: 2024-05-03 | Stop reason: HOSPADM

## 2024-04-27 RX ORDER — FUROSEMIDE 20 MG/1
20 TABLET ORAL DAILY
Status: DISCONTINUED | OUTPATIENT
Start: 2024-04-28 | End: 2024-04-28

## 2024-04-27 RX ORDER — SODIUM CHLORIDE, SODIUM LACTATE, POTASSIUM CHLORIDE, AND CALCIUM CHLORIDE .6; .31; .03; .02 G/100ML; G/100ML; G/100ML; G/100ML
1000 INJECTION, SOLUTION INTRAVENOUS ONCE
Status: COMPLETED | OUTPATIENT
Start: 2024-04-27 | End: 2024-04-27

## 2024-04-27 RX ORDER — GLUCAGON 1 MG/ML
1 KIT INJECTION PRN
Status: DISCONTINUED | OUTPATIENT
Start: 2024-04-27 | End: 2024-05-03 | Stop reason: HOSPADM

## 2024-04-27 RX ORDER — ACETAMINOPHEN 325 MG/1
650 TABLET ORAL EVERY 6 HOURS PRN
Status: DISCONTINUED | OUTPATIENT
Start: 2024-04-27 | End: 2024-05-01 | Stop reason: SDUPTHER

## 2024-04-27 RX ADMIN — IOMEPROL INJECTION 100 ML: 714 INJECTION, SOLUTION INTRAVASCULAR at 19:15

## 2024-04-27 RX ADMIN — SODIUM CHLORIDE, POTASSIUM CHLORIDE, SODIUM LACTATE AND CALCIUM CHLORIDE 1000 ML: 600; 310; 30; 20 INJECTION, SOLUTION INTRAVENOUS at 18:36

## 2024-04-27 ASSESSMENT — PAIN SCALES - GENERAL
PAINLEVEL_OUTOF10: 0
PAINLEVEL_OUTOF10: 0

## 2024-04-27 ASSESSMENT — PAIN - FUNCTIONAL ASSESSMENT: PAIN_FUNCTIONAL_ASSESSMENT: 0-10

## 2024-04-27 ASSESSMENT — LIFESTYLE VARIABLES
HOW OFTEN DO YOU HAVE A DRINK CONTAINING ALCOHOL: NEVER
HOW MANY STANDARD DRINKS CONTAINING ALCOHOL DO YOU HAVE ON A TYPICAL DAY: PATIENT DOES NOT DRINK

## 2024-04-27 NOTE — ED PROVIDER NOTES
[facility]    Pt Name: Flori Bartholomew   MRN: [id number]   Birthdate 1955   Date of evaluation: 4/27/2024   Provider: Jacob Aden MD   PCP: Arias Michaud MD   Note Started: 6:20 PM EDT 4/27/24       CHIEF COMPLAINT  c/o feeling faint today (Reports being @ work and felt like fainting/ c/o \"indigestion\" bodyaches, sinus pressure and generalized weakness x 2 days/ denies any cough fever. )       HISTORY OF PRESENT ILLNESS  Flori Bartholomew is a 68 y.o. female who  has a past medical history of Depression, Diabetes mellitus (HCC), GERD (gastroesophageal reflux disease), Hypertension, Paroxysmal atrial fibrillation (HCC), Thyroid disease, and Typical atrial flutter (HCC).   who presents to the ED complaining of feeling like she was going to pass out.  Patient has not felt well for the past couple of days with some nausea and sinus pressure.  However she denies fever or coughing or feeling short of breath.  No actual vomiting.    Recently underwent a cardiac ablation on April 10.  She also had an ablation back in January of this year for atrial flutter.  She then wore a monitor and ended up being found to have an atrial tachycardia as well as some persistent atrial fib and flutter.  Baseline rhythm is usually sinus bradycardia.  She is on Eliquis.  She has not noted any blood in her stools.  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  She only worked for couple of hours but she just felt very weak like she was going to pass out.  Again, no chest pain.  No chest pain.    I have reviewed the following from the nursing documentation:        HISTORY :      Past Medical History:   Diagnosis Date    Depression     Diabetes mellitus (HCC)     GERD (gastroesophageal reflux disease)     Hypertension     Paroxysmal atrial fibrillation (HCC)     Thyroid disease     Typical atrial flutter (HCC)      Past Surgical History:   Procedure Laterality Date

## 2024-04-28 PROBLEM — R09.02 HYPOXIA: Status: ACTIVE | Noted: 2024-04-28

## 2024-04-28 LAB
ANION GAP SERPL CALCULATED.3IONS-SCNC: 10 MMOL/L (ref 3–16)
BASOPHILS # BLD: 0 K/UL (ref 0–0.2)
BASOPHILS NFR BLD: 0.3 %
BUN SERPL-MCNC: 14 MG/DL (ref 7–20)
CALCIUM SERPL-MCNC: 9.3 MG/DL (ref 8.3–10.6)
CHLORIDE SERPL-SCNC: 93 MMOL/L (ref 99–110)
CO2 SERPL-SCNC: 30 MMOL/L (ref 21–32)
CREAT SERPL-MCNC: 0.6 MG/DL (ref 0.6–1.2)
DEPRECATED RDW RBC AUTO: 14.4 % (ref 12.4–15.4)
EKG ATRIAL RATE: 75 BPM
EKG DIAGNOSIS: NORMAL
EKG P AXIS: 83 DEGREES
EKG P-R INTERVAL: 190 MS
EKG Q-T INTERVAL: 446 MS
EKG QRS DURATION: 138 MS
EKG QTC CALCULATION (BAZETT): 501 MS
EKG R AXIS: -45 DEGREES
EKG T AXIS: 91 DEGREES
EKG VENTRICULAR RATE: 76 BPM
EOSINOPHIL # BLD: 0.1 K/UL (ref 0–0.6)
EOSINOPHIL NFR BLD: 1.3 %
GFR SERPLBLD CREATININE-BSD FMLA CKD-EPI: >90 ML/MIN/{1.73_M2}
GLUCOSE BLD-MCNC: 103 MG/DL (ref 70–99)
GLUCOSE BLD-MCNC: 127 MG/DL (ref 70–99)
GLUCOSE BLD-MCNC: 133 MG/DL (ref 70–99)
GLUCOSE BLD-MCNC: 142 MG/DL (ref 70–99)
GLUCOSE BLD-MCNC: 147 MG/DL (ref 70–99)
GLUCOSE SERPL-MCNC: 217 MG/DL (ref 70–99)
HCT VFR BLD AUTO: 39.3 % (ref 36–48)
HGB BLD-MCNC: 13.4 G/DL (ref 12–16)
LYMPHOCYTES # BLD: 1.1 K/UL (ref 1–5.1)
LYMPHOCYTES NFR BLD: 18.7 %
MAGNESIUM SERPL-MCNC: 2.1 MG/DL (ref 1.8–2.4)
MCH RBC QN AUTO: 31.9 PG (ref 26–34)
MCHC RBC AUTO-ENTMCNC: 34.1 G/DL (ref 31–36)
MCV RBC AUTO: 93.4 FL (ref 80–100)
MONOCYTES # BLD: 0.4 K/UL (ref 0–1.3)
MONOCYTES NFR BLD: 6.6 %
NEUTROPHILS # BLD: 4.2 K/UL (ref 1.7–7.7)
NEUTROPHILS NFR BLD: 73.1 %
PERFORMED ON: ABNORMAL
PLATELET # BLD AUTO: 169 K/UL (ref 135–450)
PMV BLD AUTO: 9.2 FL (ref 5–10.5)
POTASSIUM SERPL-SCNC: 3.2 MMOL/L (ref 3.5–5.1)
RBC # BLD AUTO: 4.21 M/UL (ref 4–5.2)
SODIUM SERPL-SCNC: 133 MMOL/L (ref 136–145)
WBC # BLD AUTO: 5.8 K/UL (ref 4–11)

## 2024-04-28 PROCEDURE — 2060000000 HC ICU INTERMEDIATE R&B

## 2024-04-28 PROCEDURE — 80048 BASIC METABOLIC PNL TOTAL CA: CPT

## 2024-04-28 PROCEDURE — 36415 COLL VENOUS BLD VENIPUNCTURE: CPT

## 2024-04-28 PROCEDURE — 6370000000 HC RX 637 (ALT 250 FOR IP): Performed by: NURSE PRACTITIONER

## 2024-04-28 PROCEDURE — 6370000000 HC RX 637 (ALT 250 FOR IP): Performed by: INTERNAL MEDICINE

## 2024-04-28 PROCEDURE — 6370000000 HC RX 637 (ALT 250 FOR IP): Performed by: STUDENT IN AN ORGANIZED HEALTH CARE EDUCATION/TRAINING PROGRAM

## 2024-04-28 PROCEDURE — 2580000003 HC RX 258: Performed by: INTERNAL MEDICINE

## 2024-04-28 PROCEDURE — 94761 N-INVAS EAR/PLS OXIMETRY MLT: CPT

## 2024-04-28 PROCEDURE — 6360000002 HC RX W HCPCS: Performed by: INTERNAL MEDICINE

## 2024-04-28 PROCEDURE — 87086 URINE CULTURE/COLONY COUNT: CPT

## 2024-04-28 PROCEDURE — 2700000000 HC OXYGEN THERAPY PER DAY

## 2024-04-28 PROCEDURE — 85025 COMPLETE CBC W/AUTO DIFF WBC: CPT

## 2024-04-28 PROCEDURE — 99223 1ST HOSP IP/OBS HIGH 75: CPT | Performed by: INTERNAL MEDICINE

## 2024-04-28 PROCEDURE — 87186 SC STD MICRODIL/AGAR DIL: CPT

## 2024-04-28 PROCEDURE — 87077 CULTURE AEROBIC IDENTIFY: CPT

## 2024-04-28 PROCEDURE — 83735 ASSAY OF MAGNESIUM: CPT

## 2024-04-28 RX ORDER — AMOXICILLIN AND CLAVULANATE POTASSIUM 875; 125 MG/1; MG/1
1 TABLET, FILM COATED ORAL EVERY 12 HOURS SCHEDULED
Status: COMPLETED | OUTPATIENT
Start: 2024-04-28 | End: 2024-05-03

## 2024-04-28 RX ORDER — SILDENAFIL CITRATE 20 MG/1
20 TABLET ORAL 3 TIMES DAILY
Status: DISCONTINUED | OUTPATIENT
Start: 2024-04-28 | End: 2024-04-29

## 2024-04-28 RX ORDER — MAGNESIUM HYDROXIDE/ALUMINUM HYDROXICE/SIMETHICONE 120; 1200; 1200 MG/30ML; MG/30ML; MG/30ML
30 SUSPENSION ORAL ONCE
Status: COMPLETED | OUTPATIENT
Start: 2024-04-28 | End: 2024-04-28

## 2024-04-28 RX ORDER — POTASSIUM CHLORIDE 20 MEQ/1
40 TABLET, EXTENDED RELEASE ORAL ONCE
Status: COMPLETED | OUTPATIENT
Start: 2024-04-28 | End: 2024-04-28

## 2024-04-28 RX ORDER — FUROSEMIDE 10 MG/ML
40 INJECTION INTRAMUSCULAR; INTRAVENOUS 2 TIMES DAILY
Status: DISCONTINUED | OUTPATIENT
Start: 2024-04-28 | End: 2024-04-29

## 2024-04-28 RX ORDER — POTASSIUM CHLORIDE 20 MEQ/1
20 TABLET, EXTENDED RELEASE ORAL 2 TIMES DAILY WITH MEALS
Status: COMPLETED | OUTPATIENT
Start: 2024-04-28 | End: 2024-04-28

## 2024-04-28 RX ADMIN — ACETAMINOPHEN 650 MG: 325 TABLET ORAL at 00:15

## 2024-04-28 RX ADMIN — FLECAINIDE ACETATE 100 MG: 100 TABLET ORAL at 09:30

## 2024-04-28 RX ADMIN — FUROSEMIDE 40 MG: 10 INJECTION, SOLUTION INTRAMUSCULAR; INTRAVENOUS at 09:37

## 2024-04-28 RX ADMIN — POTASSIUM CHLORIDE 40 MEQ: 1500 TABLET, EXTENDED RELEASE ORAL at 00:09

## 2024-04-28 RX ADMIN — AMOXICILLIN AND CLAVULANATE POTASSIUM 1 TABLET: 875; 125 TABLET, FILM COATED ORAL at 18:31

## 2024-04-28 RX ADMIN — APIXABAN 5 MG: 5 TABLET, FILM COATED ORAL at 09:38

## 2024-04-28 RX ADMIN — METOPROLOL SUCCINATE 50 MG: 50 TABLET, EXTENDED RELEASE ORAL at 09:30

## 2024-04-28 RX ADMIN — POTASSIUM CHLORIDE 20 MEQ: 1500 TABLET, EXTENDED RELEASE ORAL at 09:37

## 2024-04-28 RX ADMIN — APIXABAN 5 MG: 5 TABLET, FILM COATED ORAL at 21:14

## 2024-04-28 RX ADMIN — FUROSEMIDE 40 MG: 10 INJECTION, SOLUTION INTRAMUSCULAR; INTRAVENOUS at 17:41

## 2024-04-28 RX ADMIN — HYDROCHLOROTHIAZIDE 25 MG: 25 TABLET ORAL at 09:30

## 2024-04-28 RX ADMIN — POTASSIUM CHLORIDE 40 MEQ: 1500 TABLET, EXTENDED RELEASE ORAL at 13:47

## 2024-04-28 RX ADMIN — ALUMINUM HYDROXIDE, MAGNESIUM HYDROXIDE, AND SIMETHICONE 30 ML: 200; 200; 20 SUSPENSION ORAL at 04:56

## 2024-04-28 RX ADMIN — SODIUM CHLORIDE, PRESERVATIVE FREE 10 ML: 5 INJECTION INTRAVENOUS at 17:42

## 2024-04-28 RX ADMIN — SILDENAFIL 20 MG: 20 TABLET ORAL at 10:17

## 2024-04-28 RX ADMIN — POTASSIUM CHLORIDE 20 MEQ: 1500 TABLET, EXTENDED RELEASE ORAL at 17:08

## 2024-04-28 RX ADMIN — FLECAINIDE ACETATE 100 MG: 100 TABLET ORAL at 00:09

## 2024-04-28 RX ADMIN — PANTOPRAZOLE SODIUM 40 MG: 40 TABLET, DELAYED RELEASE ORAL at 09:30

## 2024-04-28 RX ADMIN — LEVOTHYROXINE SODIUM 150 MCG: 150 TABLET ORAL at 05:54

## 2024-04-28 RX ADMIN — SODIUM CHLORIDE, PRESERVATIVE FREE 10 ML: 5 INJECTION INTRAVENOUS at 09:30

## 2024-04-28 RX ADMIN — SILDENAFIL 20 MG: 20 TABLET ORAL at 13:47

## 2024-04-28 RX ADMIN — ACETAMINOPHEN 650 MG: 325 TABLET ORAL at 17:47

## 2024-04-28 RX ADMIN — DILTIAZEM HYDROCHLORIDE 120 MG: 120 CAPSULE, COATED, EXTENDED RELEASE ORAL at 09:29

## 2024-04-28 RX ADMIN — PIOGLITAZONE 30 MG: 30 TABLET ORAL at 09:31

## 2024-04-28 RX ADMIN — SALINE NASAL SPRAY 1 SPRAY: 1.5 SOLUTION NASAL at 05:54

## 2024-04-28 RX ADMIN — APIXABAN 5 MG: 5 TABLET, FILM COATED ORAL at 00:09

## 2024-04-28 RX ADMIN — SERTRALINE HYDROCHLORIDE 100 MG: 100 TABLET ORAL at 09:30

## 2024-04-28 RX ADMIN — SODIUM CHLORIDE, PRESERVATIVE FREE 10 ML: 5 INJECTION INTRAVENOUS at 21:20

## 2024-04-28 ASSESSMENT — PAIN SCALES - GENERAL
PAINLEVEL_OUTOF10: 3
PAINLEVEL_OUTOF10: 1
PAINLEVEL_OUTOF10: 3
PAINLEVEL_OUTOF10: 0

## 2024-04-28 ASSESSMENT — PAIN DESCRIPTION - ORIENTATION: ORIENTATION: MID

## 2024-04-28 ASSESSMENT — PAIN - FUNCTIONAL ASSESSMENT: PAIN_FUNCTIONAL_ASSESSMENT: ACTIVITIES ARE NOT PREVENTED

## 2024-04-28 ASSESSMENT — PAIN DESCRIPTION - LOCATION: LOCATION: HEAD;OTHER (COMMENT)

## 2024-04-28 ASSESSMENT — PAIN DESCRIPTION - DESCRIPTORS: DESCRIPTORS: ACHING;PRESSURE

## 2024-04-28 NOTE — CONSULTS
[facility]   Cardiac Electrophysiology Consultation   Date: 4/28/2024  Admit Date:  4/27/2024  Reason for Consultation: SOB  Consult Requesting Physician: Betty Quintero MD     No chief complaint on file.    HPI: Flori Bartholomew is a 68 y.o. female with a past medical history significant for morbid obesity, BMI 42, hypertension, type 2 diabetes mellitus, hypothyroidism, paroxysmal atrial fibrillation and atrial flutter, severe pulmonary hypertension, moderate tricuspid regurgitation underwent atrial flutter and atrial fibrillation ablation by me April 10.  During the procedure, she was noted to have high left atrial pressure, with prominent V wave in the setting of normally functioning mitral regurgitation and hence, I started her on Lasix.     Patient continued to have worsening difficulty in breathing and seen by EP nurse practitioner few days ago with shortness of breath and was started on Lasix.  Secondary to ongoing shortness of breath patient came to the hospital.    EKG shows normal sinus rhythm    Telemetry shows normal sinus rhythm    Echo from 1/2024:    Normal left ventricle size, wall thickness, and systolic function with an   estimated ejection fraction of 55-60%. No regional wall motion abnormalities   are seen.   Normal diastolic function.   The right ventricle is normal in size and function.   Moderate tricuspid regurgitation.   Systolic pulmonary artery pressure (SPAP) estimated at 75 mmHg (RA pressure   15 mmHg), consistent with severe pulmonary hypertension.   IVC size is dilated (>2.1 cm) and collapses < 50% with respiration.    Impression:  Acute on chronic HFpEF  Severe pulmonary hypertension  Paroxysmal atrial fibrillation history atrial flutter, status post catheter ablation currently in sinus rhythm  Poorly controlled hypertension  Left atrial stiff syndrome    Plan:  In spite of being in sinus rhythm, she continues to have shortness of breath.  Echocardiogram is consistent with  01/08/2024 08:31 PM    INR 0.98 04/03/2024 08:18 AM    INR 0.97 01/09/2024 04:17 AM    INR 0.96 01/08/2024 08:31 PM     No results found for: \"CHOL\", \"HDL\", \"TRIG\"    Diagnostic and imaging results reviewed.     I independently reviewed the ECG and telemetry.     Scheduled Meds:   furosemide  40 mg IntraVENous BID    sildenafil  20 mg Oral TID    apixaban  5 mg Oral BID    dilTIAZem  120 mg Oral Daily    flecainide  100 mg Oral BID    hydroCHLOROthiazide  25 mg Oral Daily    levothyroxine  150 mcg Oral Daily    metoprolol succinate  50 mg Oral Daily    pantoprazole  40 mg Oral Daily    pioglitazone  30 mg Oral Daily    sertraline  100 mg Oral Daily    sodium chloride flush  5-40 mL IntraVENous 2 times per day    insulin lispro  0-4 Units SubCUTAneous TID WC    insulin lispro  0-4 Units SubCUTAneous Nightly     Continuous Infusions:   sodium chloride      dextrose       PRN Meds:.sodium chloride, sodium chloride flush, sodium chloride, potassium chloride **OR** potassium alternative oral replacement **OR** potassium chloride, magnesium sulfate, polyethylene glycol, acetaminophen **OR** acetaminophen, glucose, dextrose bolus **OR** dextrose bolus, glucagon (rDNA), dextrose     Assessment:   Patient Active Problem List    Diagnosis Date Noted    Hypoxia 04/28/2024    Dyspnea 04/27/2024    Pulmonary HTN ([facility]) 04/24/2024    AF (paroxysmal atrial fibrillation) ([facility]) 04/10/2024    Heart failure with preserved ejection fraction ([facility]) 01/09/2024    SOB (shortness of breath) 01/09/2024    A-fib ([facility]) 01/08/2024    Rapid atrial fibrillation ([facility]) 01/08/2024    Type 2 diabetes mellitus without complication, without long-term current use of insulin ([facility]) 04/06/2023    Morbid obesity ([facility]) 03/07/2018    Gastroesophageal reflux disease 09/01/2010    Hypothyroidism 09/01/2010      Active Hospital Problems    Diagnosis Date Noted    Hypoxia [R09.02] 04/28/2024    Dyspnea [R06.00] 04/27/2024       Thank you for allowing me to

## 2024-04-28 NOTE — PROGRESS NOTES
Pt states she feels lightheaded even while laying in bed. O2 sat reading 91-92% on room air. Placed on 2L for comfort and pt reporting improvement in these symptoms. O2 sat improved to 96%. Also pt requesting something for indigestion and sinus pain, NP ordered GI cocktail and nasal spray.

## 2024-04-28 NOTE — H&P
abdominal aorta. Soft Tissues/Bones: Spurring is seen in the spine     Left atrium is enlarged.  No stenosis at the pulmonary vein origins.  No thrombus in left atrial appendage. Mild coronary artery calcification.       PCP: Arias Michaud MD    Past Medical History:        Diagnosis Date    Depression     Diabetes mellitus (HCC)     GERD (gastroesophageal reflux disease)     Hypertension     Paroxysmal atrial fibrillation (HCC)     Thyroid disease     Typical atrial flutter (HCC)        Past Surgical History:        Procedure Laterality Date    CARDIAC ELECTROPHYSIOLOGY MAPPING AND ABLATION  01/12/2024    Typical atrial flutter    CHOLECYSTECTOMY      TOTAL KNEE ARTHROPLASTY         Medications Prior to Admission:   Prior to Admission medications    Medication Sig Start Date End Date Taking? Authorizing Provider   sertraline (ZOLOFT) 100 MG tablet Take 1 tablet by mouth daily   Yes Chari Camejo MD   hydroCHLOROthiazide (HYDRODIURIL) 25 MG tablet Take 1 tablet by mouth daily    Chari Camejo MD   dilTIAZem (CARDIZEM CD) 120 MG extended release capsule Take 1 capsule by mouth daily 4/18/24   Marilee Antonio APRN - CNP   furosemide (LASIX) 40 MG tablet Take 0.5 tablets by mouth daily 4/18/24   Marilee Antonio APRN - CNP   flecainide (TAMBOCOR) 100 MG tablet Take 1 tablet by mouth 2 times daily 3/11/24   Bob Goodwin MD   levothyroxine (SYNTHROID) 150 MCG tablet Take 1 tablet by mouth Daily    Chari Camejo MD   apixaban (ELIQUIS) 5 MG TABS tablet Take 1 tablet by mouth 2 times daily 1/12/24   Ousmane Villegas MD   metoprolol succinate (TOPROL XL) 25 MG extended release tablet Take 2 tablets by mouth daily 1/12/24   Ousmane Villegas MD   meloxicam (MOBIC) 15 MG tablet Take 1 tablet by mouth daily    Chari Camejo MD   pioglitazone (ACTOS) 30 MG tablet Take 1 tablet by mouth daily    Chari Camejo MD   pantoprazole (PROTONIX) 40 MG tablet Take 1 tablet by  mouth daily    Provider, MD Chari       Labs: Personally reviewed and interpreted for clinical significance.   Recent Labs     04/27/24  1800   WBC 7.6   HGB 13.2   HCT 41.3        Recent Labs     04/27/24  1800      K 3.1*   CL 91*   CO2 33*   BUN 19   CREATININE 1.0   CALCIUM 9.4   MG 2.00     Recent Labs     04/27/24  1800 04/27/24  1900   PROBNP  --  50   TROPHS 14 14     No results for input(s): \"LABA1C\" in the last 72 hours.  Recent Labs     04/27/24  1800   AST 27   ALT 24   BILITOT 0.4   ALKPHOS 104          Awilda Dash MD

## 2024-04-28 NOTE — PROGRESS NOTES
4 Eyes Skin Assessment     NAME:  Flori Bartholomew  YOB: 1955  MEDICAL RECORD NUMBER:  [id number]    The patient is being assessed for  Admission    I agree that at least one RN has performed a thorough Head to Toe Skin Assessment on the patient. ALL assessment sites listed below have been assessed.      Areas assessed by both nurses:    Head, Face, Ears, Shoulders, Back, Chest, Arms, Elbows, Hands, Sacrum. Buttock, Coccyx, Ischium, Legs. Feet and Heels, and Under Medical Devices         Does the Patient have a Wound? No noted wound(s)       Redness/excoriation inner buttocks, blanchable redness coccyx.    Mateo Prevention initiated by RN: No  Wound Care Orders initiated by RN: No    Pressure Injury (Stage 3,4, Unstageable, DTI, NWPT, and Complex wounds) if present, place Wound referral order by RN under : No    New Ostomies, if present place, Ostomy referral order under : No     Nurse 1 eSignature: Electronically signed by Francoise Caruso RN on 4/28/24 at 12:43 AM EDT    **SHARE this note so that the co-signing nurse can place an eSignature**    Nurse 2 eSignature: Electronically signed by Jasmin Escalante RN on 4/28/24 at 3:29 AM EDT

## 2024-04-28 NOTE — PLAN OF CARE
Problem: Discharge Planning  Goal: Discharge to home or other facility with appropriate resources  Outcome: Progressing  Flowsheets (Taken 4/28/2024 0040)  Discharge to home or other facility with appropriate resources:   Arrange for needed discharge resources and transportation as appropriate   Identify barriers to discharge with patient and caregiver   Identify discharge learning needs (meds, wound care, etc)   Arrange for interpreters to assist at discharge as needed   Refer to discharge planning if patient needs post-hospital services based on physician order or complex needs related to functional status, cognitive ability or social support system     Problem: Safety - Adult  Goal: Free from fall injury  Outcome: Progressing  Flowsheets (Taken 4/28/2024 0040)  Free From Fall Injury: Instruct family/caregiver on patient safety     Problem: Respiratory - Adult  Goal: Achieves optimal ventilation and oxygenation  Outcome: Progressing  Flowsheets (Taken 4/28/2024 0041)  Achieves optimal ventilation and oxygenation:   Assess for changes in respiratory status   Assess for changes in mentation and behavior   Position to facilitate oxygenation and minimize respiratory effort   Oxygen supplementation based on oxygen saturation or arterial blood gases   Encourage broncho-pulmonary hygiene including cough, deep breathe, incentive spirometry   Assess the need for suctioning and aspirate as needed   Assess and instruct to report shortness of breath or any respiratory difficulty

## 2024-04-28 NOTE — PROGRESS NOTES
Hospitalist Progress Note    Assessment and Plan   Flori Bartholomew is a 68 y.o. female with history of atrial fibrillation s/p ablation 4/2024, hypertension, type 2 diabetes who presented on 4/27 with dyspnea on exertion.  Upon presentation to the ED patient was not hypoxic on room air but SpO2 dropped to 83% with exertion.  CTA chest negative for pulmonary embolism but concerning for pulmonary hypertension.     Dyspnea with exertion  Acute on chronic HFpEF  Severe pulmonary hypertension  -TTE 1/2024 SPAP 75 consistent with severe PAH  -Started on IV Lasix and sildenafil 20 mg 3 times daily  -monitor renal function and electrolytes while on IV lasix  -Limited TTE ordered.  Seen by EP and consulted Dr. Tena    Atrial fibrillation s/p ablation 4/10/2024  - Controlled.  Continue metoprolol 50 mg daily, flecainide 100 mg twice daily, Cardizem 120 mg daily  - Continue Eliquis 5 mg twice daily  - Reviewed EKG on admission in sinus rhythm    Type 2 diabetes with hyperglycemia, without long-term use of insulin  A1C 6.2 3/2024  - Holding home pioglitazone 30 mg daily  - On low sliding scale while admitted    ?Sinusitis   3 days of sinus pressure, has history of sinus infections   Treat empirically with augmentin    Hypertension  On Lasix 20 mg daily, hydrochlorothiazide 25 mg daily    Hypothyroidism  Continue Synthroid 150 mcg daily    Depression  Continue Zoloft 100 mg daily    CODE STATUS full  Next of kin: Caio Horowitz  DVT prophylaxis Eliquis    Subjective:     No acute events overnight  Feeling okay this morning.  Main complaint is sinus pressure that is consistent with her past sinus infections.  Feels her breathing has somewhat improved since receiving IV Lasix.    Objective:     Intake/Output Summary (Last 24 hours) at 4/28/2024 0937  Last data filed at 4/28/2024 0434  Gross per 24 hour   Intake 240 ml   Output --   Net 240 ml      Vitals:   Vitals:    04/28/24 0434 04/28/24 0435 04/28/24 0751 04/28/24 0806   BP:  c/o \"indigestion\" bodyaches, sinus pressure and generalized weakness x 2 days/ denies any cough fever hx a fib with ablation FINDINGS: The heart is borderline enlarged.  The pulmonary vessels are slightly engorged centrally and less prominent.  Lungs are hypoinflated.  There is moderate asymmetric elevation of the right hemidiaphragm which is more prominent.  No effusion or consolidation is seen.     Moderate asymmetric elevation of the right hemidiaphragm which is more prominent.  Recommend follow-up to assure stability. Borderline cardiomegaly and mild central pulmonary congestion which is less prominent with no obvious infiltrate or effusion.       CBC:   Recent Labs     04/27/24  1800   WBC 7.6   HGB 13.2        BMP:    Recent Labs     04/27/24  1800      K 3.1*   CL 91*   CO2 33*   BUN 19   CREATININE 1.0   GLUCOSE 160*     Hepatic:   Recent Labs     04/27/24  1800   AST 27   ALT 24   BILITOT 0.4   ALKPHOS 104     Lipids: No results found for: \"CHOL\", \"HDL\", \"TRIG\"  Hemoglobin A1C: No results found for: \"LABA1C\"  TSH: No results found for: \"TSH\"  Troponin: No results found for: \"TROPONINT\"  Lactic Acid: No results for input(s): \"LACTA\" in the last 72 hours.  BNP:   Recent Labs     04/27/24  1900   PROBNP 50     UA:  Lab Results   Component Value Date/Time    NITRU Negative 04/27/2024 06:45 PM    COLORU Yellow 04/27/2024 06:45 PM    PHUR 5.5 04/27/2024 06:45 PM    WBCUA 6-9 04/27/2024 06:45 PM    RBCUA 3-4 04/27/2024 06:45 PM    MUCUS 3+ 04/27/2024 06:45 PM    BACTERIA 1+ 04/27/2024 06:45 PM    CLARITYU Clear 04/27/2024 06:45 PM    SPECGRAV >=1.030 04/27/2024 06:45 PM    LEUKOCYTESUR Negative 04/27/2024 06:45 PM    UROBILINOGEN 1.0 04/27/2024 06:45 PM    BILIRUBINUR SMALL 04/27/2024 06:45 PM    BLOODU Negative 04/27/2024 06:45 PM    GLUCOSEU Negative 04/27/2024 06:45 PM    KETUA Negative 04/27/2024 06:45 PM     Urine Cultures:   Lab Results   Component Value Date/Time    LABURIN 50,000 CFU/ml

## 2024-04-28 NOTE — PLAN OF CARE
Problem: Discharge Planning  Goal: Discharge to home or other facility with appropriate resources  4/28/2024 0953 by Anca Smiley, RN  Outcome: Progressing  Flowsheets (Taken 4/28/2024 0953)  Discharge to home or other facility with appropriate resources:   Identify barriers to discharge with patient and caregiver   Identify discharge learning needs (meds, wound care, etc)   Refer to discharge planning if patient needs post-hospital services based on physician order or complex needs related to functional status, cognitive ability or social support system   Arrange for needed discharge resources and transportation as appropriate     Problem: Safety - Adult  Goal: Free from fall injury  4/28/2024 0953 by Anca Smiley, RN  Outcome: Progressing  Flowsheets (Taken 4/28/2024 0953)  Free From Fall Injury: Instruct family/caregiver on patient safety     Problem: ABCDS Injury Assessment  Goal: Absence of physical injury  Outcome: Progressing  Flowsheets (Taken 4/28/2024 0953)  Absence of Physical Injury: Implement safety measures based on patient assessment     Problem: Respiratory - Adult  Goal: Achieves optimal ventilation and oxygenation  4/28/2024 0041 by Francoise Caruso RN  Outcome: Progressing  Flowsheets (Taken 4/28/2024 0041)  Achieves optimal ventilation and oxygenation:   Assess for changes in respiratory status   Assess for changes in mentation and behavior   Position to facilitate oxygenation and minimize respiratory effort   Oxygen supplementation based on oxygen saturation or arterial blood gases   Encourage broncho-pulmonary hygiene including cough, deep breathe, incentive spirometry   Assess the need for suctioning and aspirate as needed   Assess and instruct to report shortness of breath or any respiratory difficulty     Problem: Chronic Conditions and Co-morbidities  Goal: Patient's chronic conditions and co-morbidity symptoms are monitored and maintained or improved  Outcome:

## 2024-04-29 PROBLEM — Z79.899 ENCOUNTER FOR MONITORING FLECAINIDE THERAPY: Status: ACTIVE | Noted: 2024-04-29

## 2024-04-29 PROBLEM — I10 BENIGN ESSENTIAL HTN: Status: ACTIVE | Noted: 2024-04-29

## 2024-04-29 PROBLEM — Z79.01 ON CONTINUOUS ORAL ANTICOAGULATION: Status: ACTIVE | Noted: 2024-04-29

## 2024-04-29 PROBLEM — I48.3 TYPICAL ATRIAL FLUTTER (HCC): Status: ACTIVE | Noted: 2024-04-29

## 2024-04-29 PROBLEM — Z51.81 ENCOUNTER FOR MONITORING FLECAINIDE THERAPY: Status: ACTIVE | Noted: 2024-04-29

## 2024-04-29 LAB
ANION GAP SERPL CALCULATED.3IONS-SCNC: 5 MMOL/L (ref 3–16)
ANTI-XA UNFRAC HEPARIN: >1.1 IU/ML (ref 0.3–0.7)
APTT BLD: 29.3 SEC (ref 22.1–36.4)
APTT BLD: 31.4 SEC (ref 22.1–36.4)
APTT BLD: 39.5 SEC (ref 22.1–36.4)
BUN SERPL-MCNC: 20 MG/DL (ref 7–20)
CALCIUM SERPL-MCNC: 9.2 MG/DL (ref 8.3–10.6)
CHLORIDE SERPL-SCNC: 97 MMOL/L (ref 99–110)
CO2 SERPL-SCNC: 31 MMOL/L (ref 21–32)
CREAT SERPL-MCNC: 0.7 MG/DL (ref 0.6–1.2)
DEPRECATED RDW RBC AUTO: 14.4 % (ref 12.4–15.4)
GFR SERPLBLD CREATININE-BSD FMLA CKD-EPI: >90 ML/MIN/{1.73_M2}
GLUCOSE BLD-MCNC: 114 MG/DL (ref 70–99)
GLUCOSE BLD-MCNC: 121 MG/DL (ref 70–99)
GLUCOSE BLD-MCNC: 131 MG/DL (ref 70–99)
GLUCOSE BLD-MCNC: 150 MG/DL (ref 70–99)
GLUCOSE SERPL-MCNC: 109 MG/DL (ref 70–99)
HCT VFR BLD AUTO: 39.7 % (ref 36–48)
HGB BLD-MCNC: 13.1 G/DL (ref 12–16)
INR PPP: 1.26 (ref 0.85–1.15)
MAGNESIUM SERPL-MCNC: 2.4 MG/DL (ref 1.8–2.4)
MCH RBC QN AUTO: 31.1 PG (ref 26–34)
MCHC RBC AUTO-ENTMCNC: 33 G/DL (ref 31–36)
MCV RBC AUTO: 94.2 FL (ref 80–100)
PERFORMED ON: ABNORMAL
PLATELET # BLD AUTO: 212 K/UL (ref 135–450)
PMV BLD AUTO: 9.4 FL (ref 5–10.5)
POTASSIUM SERPL-SCNC: 3.8 MMOL/L (ref 3.5–5.1)
PROTHROMBIN TIME: 16 SEC (ref 11.9–14.9)
RBC # BLD AUTO: 4.22 M/UL (ref 4–5.2)
SODIUM SERPL-SCNC: 133 MMOL/L (ref 136–145)
WBC # BLD AUTO: 6.5 K/UL (ref 4–11)

## 2024-04-29 PROCEDURE — 97162 PT EVAL MOD COMPLEX 30 MIN: CPT

## 2024-04-29 PROCEDURE — 6360000002 HC RX W HCPCS: Performed by: INTERNAL MEDICINE

## 2024-04-29 PROCEDURE — 97530 THERAPEUTIC ACTIVITIES: CPT

## 2024-04-29 PROCEDURE — 99223 1ST HOSP IP/OBS HIGH 75: CPT | Performed by: INTERNAL MEDICINE

## 2024-04-29 PROCEDURE — 93308 TTE F-UP OR LMTD: CPT

## 2024-04-29 PROCEDURE — 99233 SBSQ HOSP IP/OBS HIGH 50: CPT

## 2024-04-29 PROCEDURE — 6370000000 HC RX 637 (ALT 250 FOR IP): Performed by: INTERNAL MEDICINE

## 2024-04-29 PROCEDURE — 85520 HEPARIN ASSAY: CPT

## 2024-04-29 PROCEDURE — 83735 ASSAY OF MAGNESIUM: CPT

## 2024-04-29 PROCEDURE — 85610 PROTHROMBIN TIME: CPT

## 2024-04-29 PROCEDURE — 2060000000 HC ICU INTERMEDIATE R&B

## 2024-04-29 PROCEDURE — 85730 THROMBOPLASTIN TIME PARTIAL: CPT

## 2024-04-29 PROCEDURE — 6370000000 HC RX 637 (ALT 250 FOR IP): Performed by: STUDENT IN AN ORGANIZED HEALTH CARE EDUCATION/TRAINING PROGRAM

## 2024-04-29 PROCEDURE — 85027 COMPLETE CBC AUTOMATED: CPT

## 2024-04-29 PROCEDURE — 36415 COLL VENOUS BLD VENIPUNCTURE: CPT

## 2024-04-29 PROCEDURE — 97116 GAIT TRAINING THERAPY: CPT

## 2024-04-29 PROCEDURE — 80048 BASIC METABOLIC PNL TOTAL CA: CPT

## 2024-04-29 PROCEDURE — 97166 OT EVAL MOD COMPLEX 45 MIN: CPT

## 2024-04-29 PROCEDURE — 97535 SELF CARE MNGMENT TRAINING: CPT

## 2024-04-29 PROCEDURE — 2580000003 HC RX 258: Performed by: INTERNAL MEDICINE

## 2024-04-29 RX ORDER — HEPARIN SODIUM 1000 [USP'U]/ML
4000 INJECTION, SOLUTION INTRAVENOUS; SUBCUTANEOUS PRN
Status: DISCONTINUED | OUTPATIENT
Start: 2024-04-29 | End: 2024-05-02

## 2024-04-29 RX ORDER — HEPARIN SODIUM 10000 [USP'U]/100ML
5-30 INJECTION, SOLUTION INTRAVENOUS CONTINUOUS
Status: DISCONTINUED | OUTPATIENT
Start: 2024-04-29 | End: 2024-05-02

## 2024-04-29 RX ORDER — POTASSIUM CHLORIDE 20 MEQ/1
40 TABLET, EXTENDED RELEASE ORAL DAILY
Status: DISCONTINUED | OUTPATIENT
Start: 2024-04-29 | End: 2024-05-03 | Stop reason: HOSPADM

## 2024-04-29 RX ORDER — HEPARIN SODIUM 1000 [USP'U]/ML
2000 INJECTION, SOLUTION INTRAVENOUS; SUBCUTANEOUS PRN
Status: DISCONTINUED | OUTPATIENT
Start: 2024-04-29 | End: 2024-05-02

## 2024-04-29 RX ORDER — HEPARIN SODIUM 1000 [USP'U]/ML
30 INJECTION, SOLUTION INTRAVENOUS; SUBCUTANEOUS PRN
Status: DISCONTINUED | OUTPATIENT
Start: 2024-04-29 | End: 2024-04-29

## 2024-04-29 RX ORDER — HEPARIN SODIUM 10000 [USP'U]/100ML
5-30 INJECTION, SOLUTION INTRAVENOUS CONTINUOUS
Status: DISCONTINUED | OUTPATIENT
Start: 2024-04-29 | End: 2024-04-29

## 2024-04-29 RX ORDER — FUROSEMIDE 40 MG/1
40 TABLET ORAL DAILY
Status: DISCONTINUED | OUTPATIENT
Start: 2024-04-30 | End: 2024-05-02

## 2024-04-29 RX ADMIN — FLECAINIDE ACETATE 100 MG: 100 TABLET ORAL at 09:45

## 2024-04-29 RX ADMIN — ACETAMINOPHEN 650 MG: 325 TABLET ORAL at 19:52

## 2024-04-29 RX ADMIN — PIOGLITAZONE 30 MG: 30 TABLET ORAL at 09:45

## 2024-04-29 RX ADMIN — DILTIAZEM HYDROCHLORIDE 120 MG: 120 CAPSULE, COATED, EXTENDED RELEASE ORAL at 09:45

## 2024-04-29 RX ADMIN — FLECAINIDE ACETATE 100 MG: 100 TABLET ORAL at 19:52

## 2024-04-29 RX ADMIN — SILDENAFIL 20 MG: 20 TABLET ORAL at 09:45

## 2024-04-29 RX ADMIN — POTASSIUM CHLORIDE 40 MEQ: 1500 TABLET, EXTENDED RELEASE ORAL at 12:43

## 2024-04-29 RX ADMIN — HYDROCHLOROTHIAZIDE 25 MG: 25 TABLET ORAL at 09:45

## 2024-04-29 RX ADMIN — HEPARIN SODIUM 9 UNITS/KG/HR: 10000 INJECTION, SOLUTION INTRAVENOUS at 16:26

## 2024-04-29 RX ADMIN — AMOXICILLIN AND CLAVULANATE POTASSIUM 1 TABLET: 875; 125 TABLET, FILM COATED ORAL at 09:45

## 2024-04-29 RX ADMIN — HEPARIN SODIUM 4000 UNITS: 1000 INJECTION INTRAVENOUS; SUBCUTANEOUS at 23:03

## 2024-04-29 RX ADMIN — SERTRALINE HYDROCHLORIDE 100 MG: 100 TABLET ORAL at 09:45

## 2024-04-29 RX ADMIN — AMOXICILLIN AND CLAVULANATE POTASSIUM 1 TABLET: 875; 125 TABLET, FILM COATED ORAL at 19:52

## 2024-04-29 RX ADMIN — SODIUM CHLORIDE, PRESERVATIVE FREE 10 ML: 5 INJECTION INTRAVENOUS at 19:53

## 2024-04-29 RX ADMIN — FUROSEMIDE 40 MG: 10 INJECTION, SOLUTION INTRAMUSCULAR; INTRAVENOUS at 09:46

## 2024-04-29 RX ADMIN — SODIUM CHLORIDE, PRESERVATIVE FREE 10 ML: 5 INJECTION INTRAVENOUS at 09:45

## 2024-04-29 RX ADMIN — METOPROLOL SUCCINATE 50 MG: 50 TABLET, EXTENDED RELEASE ORAL at 09:45

## 2024-04-29 RX ADMIN — EMPAGLIFLOZIN 10 MG: 10 TABLET, FILM COATED ORAL at 12:43

## 2024-04-29 RX ADMIN — PANTOPRAZOLE SODIUM 40 MG: 40 TABLET, DELAYED RELEASE ORAL at 09:44

## 2024-04-29 RX ADMIN — APIXABAN 5 MG: 5 TABLET, FILM COATED ORAL at 09:45

## 2024-04-29 RX ADMIN — LEVOTHYROXINE SODIUM 150 MCG: 150 TABLET ORAL at 05:04

## 2024-04-29 ASSESSMENT — PAIN SCALES - GENERAL
PAINLEVEL_OUTOF10: 0
PAINLEVEL_OUTOF10: 3

## 2024-04-29 ASSESSMENT — PAIN - FUNCTIONAL ASSESSMENT: PAIN_FUNCTIONAL_ASSESSMENT: ACTIVITIES ARE NOT PREVENTED

## 2024-04-29 ASSESSMENT — PAIN DESCRIPTION - DESCRIPTORS: DESCRIPTORS: ACHING;PRESSURE

## 2024-04-29 ASSESSMENT — PAIN DESCRIPTION - LOCATION: LOCATION: HEAD

## 2024-04-29 ASSESSMENT — PAIN DESCRIPTION - ORIENTATION: ORIENTATION: RIGHT;ANTERIOR

## 2024-04-29 NOTE — PLAN OF CARE
Problem: Discharge Planning  Goal: Discharge to home or other facility with appropriate resources  4/29/2024 1656 by Buzz Reese RN  Outcome: Progressing  Flowsheets (Taken 4/29/2024 0454 by Aixa Madden RN)  Discharge to home or other facility with appropriate resources: Identify barriers to discharge with patient and caregiver  4/29/2024 0259 by Sanjiv Barnett RN  Outcome: Progressing     Problem: Safety - Adult  Goal: Free from fall injury  4/29/2024 1656 by Buzz Reese RN  Outcome: Progressing  4/29/2024 0259 by Sanjiv Barnett RN  Outcome: Progressing     Problem: ABCDS Injury Assessment  Goal: Absence of physical injury  4/29/2024 1656 by Buzz Reese RN  Outcome: Progressing  4/29/2024 0259 by Sanjiv Barnett RN  Outcome: Progressing     Problem: Respiratory - Adult  Goal: Achieves optimal ventilation and oxygenation  4/29/2024 1656 by Buzz Reese RN  Outcome: Progressing  Flowsheets (Taken 4/29/2024 0454 by Aixa Madden RN)  Achieves optimal ventilation and oxygenation: Assess for changes in respiratory status  4/29/2024 0259 by Sanjiv Barnett RN  Outcome: Progressing     Problem: Chronic Conditions and Co-morbidities  Goal: Patient's chronic conditions and co-morbidity symptoms are monitored and maintained or improved  4/29/2024 1656 by Buzz Reese RN  Outcome: Progressing  Flowsheets (Taken 4/29/2024 0454 by Aixa Madden, RN)  Care Plan - Patient's Chronic Conditions and Co-Morbidity Symptoms are Monitored and Maintained or Improved: Monitor and assess patient's chronic conditions and comorbid symptoms for stability, deterioration, or improvement  4/29/2024 0259 by Sanjiv Barnett RN  Outcome: Progressing     Problem: Pain  Goal: Verbalizes/displays adequate comfort level or baseline comfort level  4/29/2024 1656 by Buzz Reese RN  Outcome: Progressing  4/29/2024 0259 by Sanjiv Barnett RN  Outcome: Progressing     Problem: Skin/Tissue

## 2024-04-29 NOTE — PROGRESS NOTES
Electrophysiology - PROGRESS NOTE    Admit Date: 4/27/2024     Chief Complaint: SOB     Interval History:   Patient seen and examined and notes reviewed. Patient is being followed for AF, AFL, acute on chronic HFpEF. Patient with a recent RFA/PVI of AF/PACs on 4/10/2024, and previously underwent RFA of tAFL on 1/12/2024. During her most recent procedure she was noted to have high left atrial pressure, with prominent V wave in the setting of normally functioning mitral regurgitation therefore she was started on Lasix afterwards. She was seen in the office 1 week after her procedure and her Lasix was decreased d/t concern for UTI. She was then seen a week later in the office with c/o increased SOB and her Lasix was increased back.  She stated that she had gone to work Friday night and had no issues, however on Saturday when she was there she felt worse and had worsening SOB. Patient then presented to ED with worsening SOB. She was started on IV Lasix and sildenafil. She had a limited echo this morning. Sildenafil was discontinued this morning by Dr. Tena. She states she feels better today. She was able to walk from her bed to the door and back without any difficulty. She has remained SR/SB overnight.     In: 660 [P.O.:660]  Out: 2350    Wt Readings from Last 2 Encounters:   04/27/24 116 kg (255 lb 11.7 oz)   04/27/24 114 kg (251 lb 5.2 oz)         Data:   Scheduled Meds:   Scheduled Meds:   [START ON 4/30/2024] furosemide  40 mg Oral Daily    potassium chloride  40 mEq Oral Daily    empagliflozin  10 mg Oral Daily    amoxicillin-clavulanate  1 tablet Oral 2 times per day    dilTIAZem  120 mg Oral Daily    flecainide  100 mg Oral BID    hydroCHLOROthiazide  25 mg Oral Daily    levothyroxine  150 mcg Oral Daily    metoprolol succinate  50 mg Oral Daily    pantoprazole  40 mg Oral Daily    sertraline  100 mg Oral Daily    sodium chloride flush  5-40 mL IntraVENous 2  times per day    insulin lispro  0-4 Units SubCUTAneous TID WC    insulin lispro  0-4 Units SubCUTAneous Nightly     Continuous Infusions:   heparin (PORCINE) Infusion      sodium chloride      dextrose       PRN Meds:.heparin (porcine), heparin (porcine), sodium chloride, sodium chloride flush, sodium chloride, potassium chloride **OR** potassium alternative oral replacement **OR** potassium chloride, magnesium sulfate, polyethylene glycol, acetaminophen **OR** acetaminophen, glucose, dextrose bolus **OR** dextrose bolus, glucagon (rDNA), dextrose  Continuous Infusions:   heparin (PORCINE) Infusion      sodium chloride      dextrose         Intake/Output Summary (Last 24 hours) at 4/29/2024 1301  Last data filed at 4/29/2024 0943  Gross per 24 hour   Intake 480 ml   Output 1450 ml   Net -970 ml       CBC:   Lab Results   Component Value Date/Time    WBC 6.5 04/29/2024 09:34 AM    HGB 13.1 04/29/2024 09:34 AM     04/29/2024 09:34 AM     BMP:  Lab Results   Component Value Date/Time     04/29/2024 09:34 AM    K 3.8 04/29/2024 09:34 AM    K 3.2 04/28/2024 10:28 AM    CL 97 04/29/2024 09:34 AM    CO2 31 04/29/2024 09:34 AM    BUN 20 04/29/2024 09:34 AM    CREATININE 0.7 04/29/2024 09:34 AM    GLUCOSE 109 04/29/2024 09:34 AM     INR:   Lab Results   Component Value Date/Time    INR 1.26 04/29/2024 11:17 AM    INR 0.98 04/03/2024 08:18 AM    INR 0.97 01/09/2024 04:17 AM        CARDIAC LABS  ENZYMES:No results for input(s): \"CKMB\", \"CKMBINDEX\", \"TROPONINI\" in the last 72 hours.    Invalid input(s): \"CKTOTAL;3\"  FASTING LIPID PANEL:No results found for: \"HDL\", \"LDLDIRECT\", \"LDLCALC\", \"TRIG\", \"TSH\"  LIVER PROFILE:  Lab Results   Component Value Date/Time    AST 27 04/27/2024 06:00 PM    AST 25 01/08/2024 02:20 AM    ALT 24 04/27/2024 06:00 PM    ALT 21 01/08/2024 02:20 AM       -----------------------------------------------------------------  Telemetry: Personally reviewed  SR/SB    Objective:   Vitals: BP (!)

## 2024-04-29 NOTE — PROGRESS NOTES
Occupational Therapy  Facility/Department: [address]  Occupational Therapy Initial Assessment/Tx Note    Name: Flori Bartholomew  : 1955  MRN: [id number]  Date of Service: 2024    Assessment: Pt is independent with ADLs, functional mobility at home but has some endurance limitations and does struggle at times with transfers from lower surfaces. Presently, she requires SBA to CGA for mobility, increased assist with ADLs, and is desaturating to 77% with room mobility on RA. Pt is encouraged to increase activity levels during this admit. At d/c, recommend initial 24 hr A and home OT/PT.    Discharge Recommendations:  24 hour supervision or assist, Home with Home health OT    OT Equipment Recommendations  Equipment Needed: Yes  Mobility Devices: ADL Assistive Devices  ADL Assistive Devices: Shower Chair with back;Reacher  Other: pulse oximeter     Treatment Diagnosis: Decreased activity tolerance, impaired ADLs and mobility      Assessment   Performance deficits / Impairments: Decreased functional mobility ;Decreased ADL status;Decreased endurance;Decreased balance;Decreased high-level IADLs  Treatment Diagnosis: Decreased activity tolerance, impaired ADLs and mobility  Decision Making: Medium Complexity  REQUIRES OT FOLLOW-UP: Yes  Activity Tolerance  Activity Tolerance: Patient Tolerated treatment well  Activity Tolerance Comments: Pt on RA, recently weaned by RN with spO2 WFL at rest. After mob to/from bathroom, pt did not appear or feel SOB, but spO2 82-77%, improving to 90% in about 2 minutes. Pt educated on current status and ways she can improve her respiratory function and activity tolerance. Educated on personal pulse ox to monitor initially at home - verb understanding of all        Plan   Occupational Therapy Plan  Times Per Week: 2-5x  Times Per Day: Once a day  Current Treatment Recommendations: Strengthening, Balance training, Functional mobility training, Endurance training, Safety education &

## 2024-04-29 NOTE — PLAN OF CARE
Problem: Discharge Planning  Goal: Discharge to home or other facility with appropriate resources  Outcome: Progressing     Problem: Safety - Adult  Goal: Free from fall injury  Outcome: Progressing     Problem: ABCDS Injury Assessment  Goal: Absence of physical injury  Outcome: Progressing     Problem: Respiratory - Adult  Goal: Achieves optimal ventilation and oxygenation  Outcome: Progressing     Problem: Chronic Conditions and Co-morbidities  Goal: Patient's chronic conditions and co-morbidity symptoms are monitored and maintained or improved  Outcome: Progressing     Problem: Pain  Goal: Verbalizes/displays adequate comfort level or baseline comfort level  Outcome: Progressing

## 2024-04-29 NOTE — PROGRESS NOTES
Physical Therapy  [address]  Physical Therapy Initial Assessment and Treatment    Name: Flori Bartholomew  : 1955  MRN: [id number]  Date of Service: 2024    Discharge Recommendations:  24 hour supervision or assist, Home with Home health PT   PT Equipment Recommendations  Equipment Needed:  (cont to assess- pt has cane and walker at home, may need to use initially)      Patient Diagnosis(es): There were no encounter diagnoses.  Past Medical History:  has a past medical history of Depression, Diabetes mellitus (HCC), GERD (gastroesophageal reflux disease), Hypertension, Paroxysmal atrial fibrillation (HCC), Thyroid disease, and Typical atrial flutter (HCC).  Past Surgical History:  has a past surgical history that includes Total knee arthroplasty; Cholecystectomy; and Cardiac electrophysiology mapping and ablation (2024).    Assessment   Body Structures, Functions, Activity Limitations Requiring Skilled Therapeutic Intervention: Decreased functional mobility ;Decreased endurance;Decreased balance  Assessment: Pt with decreased independent mobility from baseline.  Pt reports normally independent with mobility with cane prn.  Currently needing CG/SBA for bed mobility, transfers and ambulation short distances in room.  Pt slighlty unsteady and does desat with activity however is not symptomatic.  Takes 1-2 minutes for sats to recover.  Pt not safe to ambulate alone at this time.  Encouarged pt to be OOB and ambulating with staff as tolerated.  Pt verbalized understanding.  Pt plans to return home at d/c.  Would benefit from 24 hr assist initailly and cont skilled PT to maximize mobility and independence.  Treatment Diagnosis: impaired functional mobility 2/2 decreased endurance  Decision Making: Medium Complexity  Requires PT Follow-Up: Yes     Plan   Physical Therapy Plan  General Plan:  (2-5)  Current Treatment Recommendations: Balance training, Functional mobility training,  climbing 3-5 steps with a railing?: A Lot  AM-PAC Inpatient Mobility Raw Score : 17  AM-PAC Inpatient T-Scale Score : 42.13  Mobility Inpatient CMS 0-100% Score: 50.57  Mobility Inpatient CMS G-Code Modifier : CK         Tinneti Score       Goals  Short Term Goals  Time Frame for Short Term Goals: By discharge  Short Term Goal 1: Sup to sit modified independent  Short Term Goal 2: Pt will transfer sit to stand supervision  Short Term Goal 3: Pt will amb >80' with LRAD supervision       Education  Patient Education  Education Given To: Patient  Education Provided: Role of Therapy;Plan of Care  Education Provided Comments: importance of OOB and frequent activity  Education Method: Verbal  Education Outcome: Verbalized understanding;Continued education needed      Therapy Time   Individual Concurrent Group Co-treatment   Time In 1406         Time Out 1444         Minutes 38             Timed Code Treatment Minutes:  23     Total Treatment Minutes:  38      Halle Houston, PT

## 2024-04-29 NOTE — PROGRESS NOTES
Pharmacy Progress Note    Heparin low dose weight based infusion is ordered for patient.  Per chart review, patient has received an oral Factor Xa inhibitor (Eliqis) within the last 72 hours.  As oral Factor Xa inhibitors can impact the anti-Xa test calibrated to unfractionated heparin, Heparin infusion will be monitored and adjusted using aPTT's per [facility] Policy.    APTT algorithm:    aPTT < 59         Heparin 60 units/kg bolus   Increase infusion by 4 units/kg/hr                            (maximum 4,000 units)  aPTT 59-72.9    Heparin 30 units/kg bolus   Increase infusion by 2 units/kg/hr                            (maximum 2,000 units)  aPTT      No bolus                              No change  aPTT 102.1-109       No bolus                       Decrease infusion by 1 unit/kg/hr  APTT 109.1-122.9    No bolus  Decrease infusion by 2 units/kg/hr  aPTT  > 123     Hold heparin for 1 hour         Decrease infusion by 3 units/kg/hr    Obtain aPTT 6 hours after initial bolus and 6 hours after any dose change until two consecutive therapeutic aPTTs are achieved - then daily.    Pharmacy will monitor daily to assist with adjustments & ordering of labs as needed.  If patient remains on heparin infusion 72 hours from last dose of oral factor Xa inhibitor, pharmacy will change infusion back to usual monitoring via the Anti-Xa algorithm per [facility] Policy, as the interaction will no longer occur at that time.    Please call pharmacy with any questions -    Miladis Lara  Pharm.D. BCPS  [phone] (Main Pharmacy)

## 2024-04-29 NOTE — CONSULTS
(CARDIZEM CD) 120 MG extended release capsule Take 1 capsule by mouth daily 4/18/24   Marilee Antonio APRN - CNP   furosemide (LASIX) 40 MG tablet Take 0.5 tablets by mouth daily 4/18/24   Marilee Antonio APRN - CNP   flecainide (TAMBOCOR) 100 MG tablet Take 1 tablet by mouth 2 times daily 3/11/24   Bob Goodwin MD   levothyroxine (SYNTHROID) 150 MCG tablet Take 1 tablet by mouth Daily    Chari Camejo MD   apixaban (ELIQUIS) 5 MG TABS tablet Take 1 tablet by mouth 2 times daily 1/12/24   Ousmane Villegas MD   metoprolol succinate (TOPROL XL) 25 MG extended release tablet Take 2 tablets by mouth daily 1/12/24   Ousmane Villegas MD   meloxicam (MOBIC) 15 MG tablet Take 1 tablet by mouth daily    Chari Camejo MD   pioglitazone (ACTOS) 30 MG tablet Take 1 tablet by mouth daily    Chari Camejo MD   pantoprazole (PROTONIX) 40 MG tablet Take 1 tablet by mouth daily    Chari Camejo MD          Inpatient Medications:   furosemide  40 mg IntraVENous BID    amoxicillin-clavulanate  1 tablet Oral 2 times per day    dilTIAZem  120 mg Oral Daily    flecainide  100 mg Oral BID    hydroCHLOROthiazide  25 mg Oral Daily    levothyroxine  150 mcg Oral Daily    metoprolol succinate  50 mg Oral Daily    pantoprazole  40 mg Oral Daily    pioglitazone  30 mg Oral Daily    sertraline  100 mg Oral Daily    sodium chloride flush  5-40 mL IntraVENous 2 times per day    insulin lispro  0-4 Units SubCUTAneous TID WC    insulin lispro  0-4 Units SubCUTAneous Nightly       IV drips:   heparin (PORCINE) Infusion      sodium chloride      dextrose         PRN:  heparin (porcine), heparin (porcine), sodium chloride, sodium chloride flush, sodium chloride, potassium chloride **OR** potassium alternative oral replacement **OR** potassium chloride, magnesium sulfate, polyethylene glycol, acetaminophen **OR** acetaminophen, glucose, dextrose bolus **OR** dextrose bolus, glucagon (rDNA),  tenderness or deformity   Heart:  Regular rhythm, rate is controlled, S1, S2 normal, there is no murmur, there is no rub or gallop, cannot assess jvd, no bilateral lower extremity edema   Abdomen:   Soft, non-tender, bowel sounds active all four quadrants,  no masses, no organomegaly       Extremities: Extremities normal, atraumatic, no cyanosis.    Pulses: 2+ and symmetric   Skin: Skin color, texture, turgor normal, no rashes or lesions   Pysch: Normal mood and affect   Neurologic: Normal gross motor and sensory exam.  Cranial nerves intact        Labs:     Recent Labs     04/27/24  1800 04/28/24  1028 04/29/24  0934    133* 133*   K 3.1* 3.2* 3.8   BUN 19 14 20   CREATININE 1.0 0.6 0.7   CL 91* 93* 97*   CO2 33* 30 31   GLUCOSE 160* 217* 109*   CALCIUM 9.4 9.3 9.2   MG 2.00 2.10  --      Recent Labs     04/27/24  1800 04/28/24  1028 04/29/24  0934   WBC 7.6 5.8 6.5   HGB 13.2 13.4 13.1   HCT 41.3 39.3 39.7    169 212   MCV 95.4 93.4 94.2     No results for input(s): \"CHOLTOT\", \"TRIG\", \"HDL\", \"CHOLHDL\", \"LDL\" in the last 72 hours.    Invalid input(s): \"LIPIDCOMM\", \"VLDCHOL\"  No results for input(s): \"PTT\", \"INR\" in the last 72 hours.    Invalid input(s): \"PT\"  No results for input(s): \"CKTOTAL\", \"CKMB\", \"CKMBINDEX\", \"TROPONINI\" in the last 72 hours.  No results for input(s): \"BNP\" in the last 72 hours.  No results for input(s): \"TSH\" in the last 72 hours.  No results for input(s): \"CHOL\", \"HDL\", \"LDLCALC\", \"TRIG\" in the last 72 hours.]    No results found for: \"CKTOTAL\", \"CKMB\", \"CKMBINDEX\", \"TROPONINI\"      Assessment / Plan:     1.  Exertional dyspnea.  It is of unknown etiology at this time and could be multifactorial (mild acute on chronic HFpEF, possible post A-fib pulmonary vein stenosis in the setting of well-known severe pulmonary hypertension and decreased lung capacity due to right hemidiaphragm elevation).  I doubt anginal equivalent in view of timing of symptoms and mild coronary

## 2024-04-30 ENCOUNTER — APPOINTMENT (OUTPATIENT)
Dept: CT IMAGING | Age: 69
End: 2024-04-30
Attending: INTERNAL MEDICINE
Payer: MEDICARE

## 2024-04-30 PROBLEM — I42.9 CARDIOMYOPATHY (HCC): Status: ACTIVE | Noted: 2024-04-30

## 2024-04-30 LAB
ANION GAP SERPL CALCULATED.3IONS-SCNC: 10 MMOL/L (ref 3–16)
APTT BLD: 103.1 SEC (ref 22.1–36.4)
APTT BLD: 73.3 SEC (ref 22.1–36.4)
APTT BLD: 74.4 SEC (ref 22.1–36.4)
BACTERIA UR CULT: ABNORMAL
BUN SERPL-MCNC: 24 MG/DL (ref 7–20)
CALCIUM SERPL-MCNC: 9.1 MG/DL (ref 8.3–10.6)
CHLORIDE SERPL-SCNC: 97 MMOL/L (ref 99–110)
CO2 SERPL-SCNC: 30 MMOL/L (ref 21–32)
CREAT SERPL-MCNC: 0.8 MG/DL (ref 0.6–1.2)
DEPRECATED RDW RBC AUTO: 14.4 % (ref 12.4–15.4)
GFR SERPLBLD CREATININE-BSD FMLA CKD-EPI: 79 ML/MIN/{1.73_M2}
GLUCOSE BLD-MCNC: 121 MG/DL (ref 70–99)
GLUCOSE BLD-MCNC: 126 MG/DL (ref 70–99)
GLUCOSE BLD-MCNC: 130 MG/DL (ref 70–99)
GLUCOSE BLD-MCNC: 171 MG/DL (ref 70–99)
GLUCOSE SERPL-MCNC: 154 MG/DL (ref 70–99)
HCT VFR BLD AUTO: 40.4 % (ref 36–48)
HGB BLD-MCNC: 13.4 G/DL (ref 12–16)
MAGNESIUM SERPL-MCNC: 2.2 MG/DL (ref 1.8–2.4)
MCH RBC QN AUTO: 30.9 PG (ref 26–34)
MCHC RBC AUTO-ENTMCNC: 33.1 G/DL (ref 31–36)
MCV RBC AUTO: 93.6 FL (ref 80–100)
ORGANISM: ABNORMAL
PERFORMED ON: ABNORMAL
PLATELET # BLD AUTO: 185 K/UL (ref 135–450)
PMV BLD AUTO: 9.5 FL (ref 5–10.5)
POTASSIUM SERPL-SCNC: 3.6 MMOL/L (ref 3.5–5.1)
RBC # BLD AUTO: 4.32 M/UL (ref 4–5.2)
SODIUM SERPL-SCNC: 137 MMOL/L (ref 136–145)
WBC # BLD AUTO: 6.9 K/UL (ref 4–11)

## 2024-04-30 PROCEDURE — 36415 COLL VENOUS BLD VENIPUNCTURE: CPT

## 2024-04-30 PROCEDURE — 71275 CT ANGIOGRAPHY CHEST: CPT

## 2024-04-30 PROCEDURE — 6370000000 HC RX 637 (ALT 250 FOR IP)

## 2024-04-30 PROCEDURE — 99233 SBSQ HOSP IP/OBS HIGH 50: CPT | Performed by: INTERNAL MEDICINE

## 2024-04-30 PROCEDURE — 99232 SBSQ HOSP IP/OBS MODERATE 35: CPT

## 2024-04-30 PROCEDURE — APPSS45 APP SPLIT SHARED TIME 31-45 MINUTES: Performed by: NURSE PRACTITIONER

## 2024-04-30 PROCEDURE — 6360000004 HC RX CONTRAST MEDICATION: Performed by: INTERNAL MEDICINE

## 2024-04-30 PROCEDURE — 6370000000 HC RX 637 (ALT 250 FOR IP): Performed by: STUDENT IN AN ORGANIZED HEALTH CARE EDUCATION/TRAINING PROGRAM

## 2024-04-30 PROCEDURE — 2060000000 HC ICU INTERMEDIATE R&B

## 2024-04-30 PROCEDURE — 6370000000 HC RX 637 (ALT 250 FOR IP): Performed by: INTERNAL MEDICINE

## 2024-04-30 PROCEDURE — 85027 COMPLETE CBC AUTOMATED: CPT

## 2024-04-30 PROCEDURE — 80048 BASIC METABOLIC PNL TOTAL CA: CPT

## 2024-04-30 PROCEDURE — 85730 THROMBOPLASTIN TIME PARTIAL: CPT

## 2024-04-30 PROCEDURE — 6360000002 HC RX W HCPCS: Performed by: INTERNAL MEDICINE

## 2024-04-30 PROCEDURE — 83735 ASSAY OF MAGNESIUM: CPT

## 2024-04-30 RX ADMIN — ACETAMINOPHEN 650 MG: 325 TABLET ORAL at 05:36

## 2024-04-30 RX ADMIN — LEVOTHYROXINE SODIUM 150 MCG: 150 TABLET ORAL at 05:37

## 2024-04-30 RX ADMIN — FLECAINIDE ACETATE 100 MG: 100 TABLET ORAL at 08:41

## 2024-04-30 RX ADMIN — PANTOPRAZOLE SODIUM 40 MG: 40 TABLET, DELAYED RELEASE ORAL at 08:42

## 2024-04-30 RX ADMIN — HEPARIN SODIUM 12 UNITS/KG/HR: 10000 INJECTION, SOLUTION INTRAVENOUS at 07:21

## 2024-04-30 RX ADMIN — HYDROCHLOROTHIAZIDE 25 MG: 25 TABLET ORAL at 08:41

## 2024-04-30 RX ADMIN — FUROSEMIDE 40 MG: 40 TABLET ORAL at 08:41

## 2024-04-30 RX ADMIN — METOPROLOL SUCCINATE 50 MG: 50 TABLET, EXTENDED RELEASE ORAL at 11:33

## 2024-04-30 RX ADMIN — DILTIAZEM HYDROCHLORIDE 120 MG: 120 CAPSULE, COATED, EXTENDED RELEASE ORAL at 11:33

## 2024-04-30 RX ADMIN — SERTRALINE HYDROCHLORIDE 100 MG: 100 TABLET ORAL at 08:41

## 2024-04-30 RX ADMIN — FLECAINIDE ACETATE 100 MG: 100 TABLET ORAL at 20:55

## 2024-04-30 RX ADMIN — AMOXICILLIN AND CLAVULANATE POTASSIUM 1 TABLET: 875; 125 TABLET, FILM COATED ORAL at 08:41

## 2024-04-30 RX ADMIN — HEPARIN SODIUM 12 UNITS/KG/HR: 10000 INJECTION, SOLUTION INTRAVENOUS at 11:42

## 2024-04-30 RX ADMIN — POTASSIUM CHLORIDE 40 MEQ: 1500 TABLET, EXTENDED RELEASE ORAL at 08:41

## 2024-04-30 RX ADMIN — IOPAMIDOL 85 ML: 755 INJECTION, SOLUTION INTRAVENOUS at 09:38

## 2024-04-30 RX ADMIN — AMOXICILLIN AND CLAVULANATE POTASSIUM 1 TABLET: 875; 125 TABLET, FILM COATED ORAL at 20:54

## 2024-04-30 RX ADMIN — EMPAGLIFLOZIN 10 MG: 10 TABLET, FILM COATED ORAL at 08:41

## 2024-04-30 ASSESSMENT — PAIN SCALES - GENERAL
PAINLEVEL_OUTOF10: 0
PAINLEVEL_OUTOF10: 3
PAINLEVEL_OUTOF10: 0

## 2024-04-30 ASSESSMENT — PAIN DESCRIPTION - ORIENTATION: ORIENTATION: RIGHT;LEFT;ANTERIOR

## 2024-04-30 ASSESSMENT — PAIN DESCRIPTION - LOCATION: LOCATION: HEAD

## 2024-04-30 ASSESSMENT — PAIN - FUNCTIONAL ASSESSMENT: PAIN_FUNCTIONAL_ASSESSMENT: ACTIVITIES ARE NOT PREVENTED

## 2024-04-30 ASSESSMENT — PAIN DESCRIPTION - DESCRIPTORS: DESCRIPTORS: ACHING;PRESSURE

## 2024-04-30 NOTE — PROGRESS NOTES
Chart reviewed    Please note that the time line, clinical picture and diagnostics are NOT consistent with Pulmonary vein stenosis    Agree with RHC to evaluate right sided pressure.     Unfortunately, we do not have the dilators available at [facility] to look for reversibility of pulmonary pressure.    More than likely her presentation is explained by SONALI Goodwin MD   Cardiac Electrophysiology  [facility] [place]  Office [phone]

## 2024-04-30 NOTE — PROGRESS NOTES
65 cc  Fluoroscopy time 13.4 minutes  Air kerma 898.08 mGy  Hemodynamics:   Left Ventricular Pressure: 13  Central Aortic Pressure: 105/57  PCWP: 19  PA: 58/20   RV: 52/7  RA: 10  CO: 4.43 L/min  CI: 2.22 L/min/m²  O2 SATS: Pulmonary artery 63%  Pulmonary wedge 70.6  Right Atrium 70.8  Arterial 88.7 there is moderate to significant pulmonary artery hypertension.   Complications : none   IMPRESSIONS: Moderate to significant pulmonary hypertension  Mild coronary artery calcification on the right side with mild plaque basically normal coronary artery flow.   Normal LV function by echocardiogram  I see nothing at this time that suggest mitral valve stenosis.    The MCOT, echocardiogram, stress test, and coronary angiography/PCI were reviewed by myself and used for my plan of care.     - The patient is counseled to follow a low salt diet to assure blood pressure remains controlled for cardiovascular risk factor modification.   - The patient is counseled to avoid excess caffeine, and energy drinks as this may exacerbated ectopy and arrhythmia.   - The patient is counseled to get regular exercise 3-5 times per week to control cardiovascular risk factors.   - The patient is counseled to lose weigt to control cardiovascular risk factors.  -The patient is counseled about the health hazards of smoking including cardiovascular side effects and its impact on morbidity and mortality.      Thank you for allowing me to participate in the care of Flori Bartholomew. All questions and concerns were addressed to the patient/family. Alternatives to my treatment were discussed.     IRoberta RN, am scribing for and in the presence of Dr. Bob Goodwin. 05/07/24 1:11 PM  JENIFFER Felton Uma Lakshmanadoss MD, personally performed the services prescribed in this documentation as scribed by Ms. Roberta Gonzalez RN in my presence and it is both accurate and complete.       Miguelina Goodwin MD  Cardiac

## 2024-04-30 NOTE — PROGRESS NOTES
Hospitalist Progress Note    Assessment and Plan   Flori Bartholomew is a 68 y.o. female with history of atrial fibrillation s/p ablation 4/2024, hypertension, type 2 diabetes who presented on 4/27 with dyspnea on exertion.  Upon presentation to the ED patient was not hypoxic on room air but SpO2 dropped to 83% with exertion.  CTA chest negative for pulmonary embolism but concerning for pulmonary hypertension.     Dyspnea with exertion  Acute on chronic HFpEF, resolved  Severe pulmonary hypertension  --TTE 1/2024 SPAP 75 consistent with severe PAH  - Cardiology following  - TTE and CTA completed both with findings compatible with PAH  - L+RHC planned for tomorrow  --anti-pulm HTN meds to be determined afterwards    Atrial fibrillation s/p ablation 4/10/2024  - Controlled.  Continue metoprolol 50 mg daily, flecainide 100 mg twice daily, Cardizem 120 mg daily  - heparin gtt in anticipation of procedures, convert to DOAC afterwards    Type 2 diabetes with hyperglycemia, without long-term use of insulin  A1C 6.2 3/2024  - Holding home pioglitazone 30 mg daily  - On low sliding scale while admitted    ?Sinusitis   3 days of sinus pressure, has history of sinus infections   Treat empirically with augmentin    UTI   Abd pressure/discomfort consistent with her past UTIs. Cultures ordered, augmentin as above, no signs of sepsis    Hypertension  On Lasix 20 mg daily, hydrochlorothiazide 25 mg daily    Hypothyroidism  Continue Synthroid 150 mcg daily    Depression  Continue Zoloft 100 mg daily    CODE STATUS full  Next of kin: Caio Horowitz  DVT prophylaxis Eliquis    Subjective:     No acute events reported overnight. Vitals remain stable on room air. No new symptoms reported today.    Objective:     Intake/Output Summary (Last 24 hours) at 4/30/2024 0940  Last data filed at 4/30/2024 0848  Gross per 24 hour   Intake 1608.03 ml   Output 3000 ml   Net -1391.97 ml      Vitals:   Vitals:    04/30/24 0530 04/30/24 0714 04/30/24

## 2024-04-30 NOTE — PLAN OF CARE
Problem: Discharge Planning  Goal: Discharge to home or other facility with appropriate resources  Outcome: Progressing  Flowsheets (Taken 4/30/2024 1227)  Discharge to home or other facility with appropriate resources:   Identify barriers to discharge with patient and caregiver   Arrange for needed discharge resources and transportation as appropriate   Identify discharge learning needs (meds, wound care, etc)  Note: Pt on heparin gtt w/ cardiac cath scheduled for tomorrow.  V/s stable.  SB on cont telemetry.     Problem: Safety - Adult  Goal: Free from fall injury  Outcome: Progressing  Flowsheets (Taken 4/30/2024 1227)  Free From Fall Injury: Instruct family/caregiver on patient safety  Note: Pt is a Fall Risk. See Sim Fall Risk Score. Pt bed in low position and side rails up. Call light and belongings in reach. Pt encouraged to call for assistance. Will continue with hourly rounds for PO intake, pain needs, toileting, and repositioning as needed.         Problem: Safety - Adult  Goal: Free from fall injury  Outcome: Progressing  Flowsheets (Taken 4/30/2024 1227)  Free From Fall Injury: Instruct family/caregiver on patient safety  Note: Pt is a Fall Risk. See Sim Fall Risk Score. Pt bed in low position and side rails up. Call light and belongings in reach. Pt encouraged to call for assistance. Will continue with hourly rounds for PO intake, pain needs, toileting, and repositioning as needed.         Problem: Respiratory - Adult  Goal: Achieves optimal ventilation and oxygenation  Outcome: Progressing  Flowsheets (Taken 4/30/2024 1227)  Achieves optimal ventilation and oxygenation:   Assess for changes in respiratory status   Assess for changes in mentation and behavior   Position to facilitate oxygenation and minimize respiratory effort   Encourage broncho-pulmonary hygiene including cough, deep breathe, incentive spirometry   Assess the need for suctioning and aspirate as needed   Assess and instruct to

## 2024-04-30 NOTE — PROGRESS NOTES
Hospitalist Progress Note    Assessment and Plan   Flori Bartholomew is a 68 y.o. female with history of atrial fibrillation s/p ablation 4/2024, hypertension, type 2 diabetes who presented on 4/27 with dyspnea on exertion.  Upon presentation to the ED patient was not hypoxic on room air but SpO2 dropped to 83% with exertion.  CTA chest negative for pulmonary embolism but concerning for pulmonary hypertension.     Dyspnea with exertion  Acute on chronic HFpEF, resolved  Severe pulmonary hypertension  -TTE 1/2024 SPAP 75 consistent with severe PAH  -transition from IV to oral lasix  -Limited TTE ordered.  Seen by EP and consulted Dr. Tena. Plan for cardiac CTA 4/30 to assess for possible post ablation pulmonary vein stenosis, and L/RHC 5/1    Atrial fibrillation s/p ablation 4/10/2024  - Controlled.  Continue metoprolol 50 mg daily, flecainide 100 mg twice daily, Cardizem 120 mg daily  - switch eliquis to heparin gtt in anticipation of procedures    Type 2 diabetes with hyperglycemia, without long-term use of insulin  A1C 6.2 3/2024  - Holding home pioglitazone 30 mg daily  - On low sliding scale while admitted    ?Sinusitis   3 days of sinus pressure, has history of sinus infections   Treat empirically with augmentin    UTI   Abd pressure/discomfort consistent with her past UTIs. Cultures ordered, augmentin as above, no signs of sepsis    Hypertension  On Lasix 20 mg daily, hydrochlorothiazide 25 mg daily    Hypothyroidism  Continue Synthroid 150 mcg daily    Depression  Continue Zoloft 100 mg daily    CODE STATUS full  Next of kin: Son Carlos Manuel  DVT prophylaxis Eliquis    Subjective:     No acute events overnight  Feeling well this morning, son at bedside. In better spirits. She is on room air and breathing comfortably but still feels SOB when she walks across the room or exerts herself.    Objective:     Intake/Output Summary (Last 24 hours) at 4/29/2024 2120  Last data filed at 4/29/2024 1823  Gross per 24 hour  (rDNA), 1 mg, PRN  dextrose, , Continuous PRN        Labs and Imaging   CT CHEST PULMONARY EMBOLISM W CONTRAST    Result Date: 4/27/2024  EXAMINATION: CTA OF THE CHEST 4/27/2024 6:54 pm TECHNIQUE: CTA of the chest was performed after the administration of intravenous contrast.  Multiplanar reformatted images are provided for review.  MIP images are provided for review. Automated exposure control, iterative reconstruction, and/or weight based adjustment of the mA/kV was utilized to reduce the radiation dose to as low as reasonably achievable. COMPARISON: 01/08/2024 HISTORY: ORDERING SYSTEM PROVIDED HISTORY: Hypoxemia with exertion.  Recent cardiac ablation with chronic elevation of right hemidiaphragm. TECHNOLOGIST PROVIDED HISTORY: Reason for exam:->Hypoxemia with exertion.  Recent cardiac ablation with chronic elevation of right hemidiaphragm. Additional Contrast?->1 Reason for Exam: Hypoxemia with exertion. Recent cardiac ablation with chronic elevation of right hemidiaphragm. Hx CHF COPD. FINDINGS: Pulmonary Arteries: There is adequate contrast opacification of the pulmonary arteries.  No pulmonary arterial filling defect is seen.  No evidence of right heart strain.  Dilated main pulmonary artery, measuring 3.5 cm in diameter. Mediastinum: No lymphadenopathy is seen. No pericardial effusion.  The thoracic aorta is normal in caliber. Lungs/pleura: The trachea is patent.  Again seen is elevation of the right hemidiaphragm with partial right lung atelectasis.  No pleural effusion or pneumothorax is seen.  No focal lung consolidation is identified. Upper Abdomen: Limited images of the upper abdomen are unremarkable. Soft Tissues/Bones: No acute bony abnormality is seen.     No pulmonary embolism is identified. Dilated main pulmonary artery, which can be seen in pulmonary arterial hypertension. Again seen is elevation of the right hemidiaphragm with partial right lung atelectasis.     XR CHEST PORTABLE    Result Date:

## 2024-04-30 NOTE — PROGRESS NOTES
Electrophysiology - PROGRESS NOTE    Admit Date: 4/27/2024     Chief Complaint: SOB     Interval History:   Patient seen and examined and notes reviewed. Patient is being followed for AF, AFL, acute on chronic HFpEF. Patient with a recent RFA/PVI of AF/PACs on 4/10/2024, and previously underwent RFA of tAFL on 1/12/2024. During her most recent procedure she was noted to have high left atrial pressure, with prominent V wave in the setting of normally functioning mitral regurgitation therefore she was started on Lasix afterwards. She was seen in the office 1 week after her procedure and her Lasix was decreased d/t concern for UTI. She was then seen a week later in the office with c/o increased SOB and her Lasix was increased back.  She stated that she had gone to work Friday night and had no issues, however on Saturday when she was there she felt worse and had worsening SOB. Patient then presented to ED with worsening SOB. She was started on IV Lasix and sildenafil. Sildenafil was stopped yesterday. Her limited echo yesterday showed an EF 55-60%, grade 1 diastolic dysfunction, and elevated SPAP. Patient remained SB overnight. Patient now on room air.    In: 1848 [P.O.:1680; I.V.:168]  Out: 3000    Wt Readings from Last 2 Encounters:   04/30/24 113.1 kg (249 lb 5.4 oz)   04/27/24 114 kg (251 lb 5.2 oz)         Data:   Scheduled Meds:   Scheduled Meds:   furosemide  40 mg Oral Daily    potassium chloride  40 mEq Oral Daily    empagliflozin  10 mg Oral Daily    amoxicillin-clavulanate  1 tablet Oral 2 times per day    dilTIAZem  120 mg Oral Daily    flecainide  100 mg Oral BID    hydroCHLOROthiazide  25 mg Oral Daily    levothyroxine  150 mcg Oral Daily    metoprolol succinate  50 mg Oral Daily    pantoprazole  40 mg Oral Daily    sertraline  100 mg Oral Daily    sodium chloride flush  5-40 mL IntraVENous 2 times per day    insulin lispro  0-4 Units SubCUTAneous TID WC

## 2024-04-30 NOTE — PROGRESS NOTES
[facility]   Interventional Cardiology  Note   Dr ZION Etienne MD, FACC   Sherri Rodrigues RN, FNP APRN CVNP    Date: 4/30/2024    Admit Date: 4/27/2024       Pt of  Dr. Goodwin / Dr Tena     CC:dyspnea, hypoxia     Following interventional cardiology today for: [facility]   Admitted with with severe dyspnea, possible AHF  hx morbid obesity, HTN, DM type II, hypothyroidism, asymptomatic PAFL and PAF (no palpitations) s/p PAFL ablation 1/12/24 and AF ablation 4/10/24 by Dr. Goodwin, severe pulmonary HTN, elevated right hemidiaphragm.     Interval Hx /  Subjective:Today, she is resting in bed  VSS HR 60s   No new complaints today. No major events overnight.   on Hep gtt / holding Eliquis   Plan LHC / RHC  tomorrow / NPO after midnight       CTA chest 1/8/2024: Elevated right hemidiaphragm.    4/28/2024 TTE The left ventricle is normal in size with mild concentric hypertrophy. Left ventricular systolic function is normal with a visually estimated ejection fraction of 55-60%. No obvious regional wall motion abnormalities noted. Grade I diastolic dysfunction with normal LV filling pressures. The right ventricle is mildly dilated. Right ventricular systolic function is normal. Mild tricuspid regurgitation. Systolic pulmonary artery pressure (SPAP) is elevated and estimated at 61 mmHg (right atrial pressure 15 mmHg) consistent with severe pulmonary hypertension .     TTE 01/2024: Normal LV, LVEF 60%, reported normal diastolic function (on personal review, indeterminate diastolic function with normal LVEDP), normal RV size and function, moderate TR, RVSP 75 (15), normal LA.   Cardiac CTA 4/3/2024 (prior to AF ablation): Mild coronary calcifications, no pulmonary vein stenosis, no intracavitary clot, left atrial enlargement.    Medical Decision Making:  Discussion of patient care with other providers  Patient seen and examined. Clinical notes reviewed. Telemetry reviewed / Pertinent labs, diagnostic, device,  Tambocor lasix HCTZ Toprol  klor con hep gtt     Plan   on Hep gtt / holding Eliquis   Plan LHC / RHC  tomorrow / NPO after midnight      Thank you for allowing to us to participate in the care of Flori Bartholomew.  Sherri HUDDLESTON-CNP-CVNP

## 2024-04-30 NOTE — PROGRESS NOTES
**OR** potassium chloride, magnesium sulfate, polyethylene glycol, acetaminophen **OR** acetaminophen, glucose, dextrose bolus **OR** dextrose bolus, glucagon (rDNA), dextrose    Vitals:    04/30/24 0848 04/30/24 1009 04/30/24 1133 04/30/24 1333   BP: 109/63 130/71 100/65    Pulse: 58 59 55 61   Resp:   18    Temp:   97.5 °F (36.4 °C)    TempSrc:   Oral    SpO2:  96% 95%    Weight:       Height:           Intake/Output Summary (Last 24 hours) at 4/30/2024 1455  Last data filed at 4/30/2024 1333  Gross per 24 hour   Intake 1488.03 ml   Output 2950 ml   Net -1461.97 ml     I/O last 3 completed shifts:  In: 1368 [P.O.:1200; I.V.:168]  Out: 3450 [Urine:3450]  Wt Readings from Last 3 Encounters:   04/30/24 113.1 kg (249 lb 5.4 oz)   04/27/24 114 kg (251 lb 5.2 oz)   04/24/24 116.6 kg (257 lb)       Admit Wt: Weight - Scale: 116 kg (255 lb 11.7 oz)   Todays Wt: Weight - Scale: 113.1 kg (249 lb 5.4 oz)      Physical Exam:         General Appearance:  Alert, cooperative, no distress, appears stated age Appropriate weight   Head:  Normocephalic, without obvious abnormality, atraumatic   Neck: Supple, symmetrical, trachea midline, no adenopathy, thyroid: not enlarged, symmetric, no tenderness/mass/nodules, no carotid bruit.    Lungs:   Normal respiratory rate, lungs clear to auscultation without any wheezes, rubs or ronchi bilaterally.    Chest Wall:  No tenderness or deformity   Heart:  Regular rhythm, rate is controlled, S1, S2 normal, there is no murmur, there is no rub or gallop, cannot assess jvd, no bilateral lower extremity edema   Abdomen:   Soft, non-tender, bowel sounds active all four quadrants,  no masses, no organomegaly   Extremities: Extremities normal, atraumatic, no cyanosis.    Pulses: 2+ and symmetric   Skin: Skin color, texture, turgor normal, no rashes or lesions   Pysch: Normal mood and affect   Neurologic: Normal gross motor and sensory exam.  Cranial nerves intact       Labs:   Recent Labs      unstable angina.           - Continue heparin drip in anticipation of cath tomorrow.  - I stopped sildenafil (need RHC to determine PAH which would qualify patient for PAH medications including sildenafil).  - Will plan for L/RHC on Wednesday for coronaries and PH assessment.  I have consulted interventional cardiology. NPO x meds after midnight.   -Currently on flecainide 100 mg p.o. twice daily, diltiazem  mg daily and Toprol 50 mg daily.  Will defer to EP medication adjustments in view of sinus bradycardia.  - Continue Lasix 40 mg po daily and Kdur 40 daily.   - Currently on hydrochlorothiazide 25 mg p.o. daily  - Strict I's and O's every shift and standing weights if possible, low-salt diet, daily BMP with reflex to Mg (correct lytes for goals K >4.0 and Mg > 2.0) and wean supplemental oxygen to off (or down to baseline supplemental oxygen requirements) for sats greater than 90%.  -I replaced Actos with Jardiance 10 mg p.o. daily due to possible Actos side effect (fluid retention) and SGLT2 inhibitors indication for HFpEF.          I have personally reviewed the reports and images of labs, radiological studies, cardiac studies including ECG's and telemetry, current and old medical records. The note was completed using EMR and Dragon dictation system. Every effort was made to ensure accuracy; however, inadvertent computerized transcription errors may be present.    All questions and concerns were addressed to the patient/family. Alternatives to my treatment were discussed.     Thank you for allowing to us to participate in the care or Flori Bartholomew. Please call our service with questions.    Juan Alberto Tena MD, [facility], FSA  [facility] [address] [address]  Ph: [phone]  Fax: [phone]

## 2024-04-30 NOTE — PROGRESS NOTES
Physical Therapy    Attempted to see pt for PT.  Pt found supine.  Declined OOB at this time.  Reports sat on side of bed and ambulated in room earlier.  Attempted to encourage pt to get up again, however pt continued to decline.  Will cont per POC  Savita Houston, PT [id number]

## 2024-05-01 PROBLEM — I27.20 PULMONARY HYPERTENSION (HCC): Status: ACTIVE | Noted: 2024-04-24

## 2024-05-01 LAB
ANION GAP SERPL CALCULATED.3IONS-SCNC: 10 MMOL/L (ref 3–16)
ANION GAP SERPL CALCULATED.3IONS-SCNC: 7 MMOL/L (ref 3–16)
APTT BLD: 49.8 SEC (ref 22.1–36.4)
APTT BLD: 69.2 SEC (ref 22.1–36.4)
BUN SERPL-MCNC: 21 MG/DL (ref 7–20)
BUN SERPL-MCNC: 23 MG/DL (ref 7–20)
CALCIUM SERPL-MCNC: 9.4 MG/DL (ref 8.3–10.6)
CALCIUM SERPL-MCNC: 9.5 MG/DL (ref 8.3–10.6)
CHLORIDE SERPL-SCNC: 93 MMOL/L (ref 99–110)
CHLORIDE SERPL-SCNC: 98 MMOL/L (ref 99–110)
CO2 SERPL-SCNC: 29 MMOL/L (ref 21–32)
CO2 SERPL-SCNC: 33 MMOL/L (ref 21–32)
CREAT SERPL-MCNC: 0.8 MG/DL (ref 0.6–1.2)
CREAT SERPL-MCNC: 0.9 MG/DL (ref 0.6–1.2)
DEPRECATED RDW RBC AUTO: 14.3 % (ref 12.4–15.4)
DEPRECATED RDW RBC AUTO: 14.5 % (ref 12.4–15.4)
GFR SERPLBLD CREATININE-BSD FMLA CKD-EPI: 69 ML/MIN/{1.73_M2}
GFR SERPLBLD CREATININE-BSD FMLA CKD-EPI: 79 ML/MIN/{1.73_M2}
GLUCOSE BLD-MCNC: 117 MG/DL (ref 70–99)
GLUCOSE BLD-MCNC: 120 MG/DL (ref 70–99)
GLUCOSE BLD-MCNC: 135 MG/DL (ref 70–99)
GLUCOSE SERPL-MCNC: 128 MG/DL (ref 70–99)
GLUCOSE SERPL-MCNC: 131 MG/DL (ref 70–99)
HCT VFR BLD AUTO: 41 % (ref 36–48)
HCT VFR BLD AUTO: 42.2 % (ref 36–48)
HGB BLD-MCNC: 13.6 G/DL (ref 12–16)
HGB BLD-MCNC: 13.8 G/DL (ref 12–16)
MAGNESIUM SERPL-MCNC: 2.4 MG/DL (ref 1.8–2.4)
MCH RBC QN AUTO: 30.8 PG (ref 26–34)
MCH RBC QN AUTO: 31.2 PG (ref 26–34)
MCHC RBC AUTO-ENTMCNC: 32.7 G/DL (ref 31–36)
MCHC RBC AUTO-ENTMCNC: 33.2 G/DL (ref 31–36)
MCV RBC AUTO: 94.1 FL (ref 80–100)
MCV RBC AUTO: 94.1 FL (ref 80–100)
PERFORMED ON: ABNORMAL
PLATELET # BLD AUTO: 200 K/UL (ref 135–450)
PLATELET # BLD AUTO: 220 K/UL (ref 135–450)
PMV BLD AUTO: 9.1 FL (ref 5–10.5)
PMV BLD AUTO: 9.1 FL (ref 5–10.5)
POTASSIUM SERPL-SCNC: 3.6 MMOL/L (ref 3.5–5.1)
POTASSIUM SERPL-SCNC: 3.8 MMOL/L (ref 3.5–5.1)
RBC # BLD AUTO: 4.36 M/UL (ref 4–5.2)
RBC # BLD AUTO: 4.48 M/UL (ref 4–5.2)
SODIUM SERPL-SCNC: 132 MMOL/L (ref 136–145)
SODIUM SERPL-SCNC: 138 MMOL/L (ref 136–145)
WBC # BLD AUTO: 7.1 K/UL (ref 4–11)
WBC # BLD AUTO: 9.3 K/UL (ref 4–11)

## 2024-05-01 PROCEDURE — 99152 MOD SED SAME PHYS/QHP 5/>YRS: CPT | Performed by: INTERNAL MEDICINE

## 2024-05-01 PROCEDURE — C1751 CATH, INF, PER/CENT/MIDLINE: HCPCS

## 2024-05-01 PROCEDURE — 85027 COMPLETE CBC AUTOMATED: CPT

## 2024-05-01 PROCEDURE — 2580000003 HC RX 258: Performed by: INTERNAL MEDICINE

## 2024-05-01 PROCEDURE — 2500000003 HC RX 250 WO HCPCS

## 2024-05-01 PROCEDURE — 2709999900 HC NON-CHARGEABLE SUPPLY

## 2024-05-01 PROCEDURE — 93460 R&L HRT ART/VENTRICLE ANGIO: CPT | Performed by: INTERNAL MEDICINE

## 2024-05-01 PROCEDURE — 80048 BASIC METABOLIC PNL TOTAL CA: CPT

## 2024-05-01 PROCEDURE — 36415 COLL VENOUS BLD VENIPUNCTURE: CPT

## 2024-05-01 PROCEDURE — 97530 THERAPEUTIC ACTIVITIES: CPT

## 2024-05-01 PROCEDURE — 6360000002 HC RX W HCPCS: Performed by: INTERNAL MEDICINE

## 2024-05-01 PROCEDURE — B2151ZZ FLUOROSCOPY OF LEFT HEART USING LOW OSMOLAR CONTRAST: ICD-10-PCS | Performed by: INTERNAL MEDICINE

## 2024-05-01 PROCEDURE — 97535 SELF CARE MNGMENT TRAINING: CPT

## 2024-05-01 PROCEDURE — C1760 CLOSURE DEV, VASC: HCPCS

## 2024-05-01 PROCEDURE — 6370000000 HC RX 637 (ALT 250 FOR IP): Performed by: INTERNAL MEDICINE

## 2024-05-01 PROCEDURE — 6370000000 HC RX 637 (ALT 250 FOR IP)

## 2024-05-01 PROCEDURE — 93460 R&L HRT ART/VENTRICLE ANGIO: CPT

## 2024-05-01 PROCEDURE — 2060000000 HC ICU INTERMEDIATE R&B

## 2024-05-01 PROCEDURE — C1894 INTRO/SHEATH, NON-LASER: HCPCS

## 2024-05-01 PROCEDURE — 99232 SBSQ HOSP IP/OBS MODERATE 35: CPT

## 2024-05-01 PROCEDURE — 4A023N8 MEASUREMENT OF CARDIAC SAMPLING AND PRESSURE, BILATERAL, PERCUTANEOUS APPROACH: ICD-10-PCS | Performed by: INTERNAL MEDICINE

## 2024-05-01 PROCEDURE — C1769 GUIDE WIRE: HCPCS

## 2024-05-01 PROCEDURE — 85730 THROMBOPLASTIN TIME PARTIAL: CPT

## 2024-05-01 PROCEDURE — 6360000002 HC RX W HCPCS

## 2024-05-01 PROCEDURE — B2111ZZ FLUOROSCOPY OF MULTIPLE CORONARY ARTERIES USING LOW OSMOLAR CONTRAST: ICD-10-PCS | Performed by: INTERNAL MEDICINE

## 2024-05-01 PROCEDURE — 83735 ASSAY OF MAGNESIUM: CPT

## 2024-05-01 PROCEDURE — 99153 MOD SED SAME PHYS/QHP EA: CPT

## 2024-05-01 PROCEDURE — 99152 MOD SED SAME PHYS/QHP 5/>YRS: CPT

## 2024-05-01 PROCEDURE — 99232 SBSQ HOSP IP/OBS MODERATE 35: CPT | Performed by: INTERNAL MEDICINE

## 2024-05-01 PROCEDURE — 6370000000 HC RX 637 (ALT 250 FOR IP): Performed by: STUDENT IN AN ORGANIZED HEALTH CARE EDUCATION/TRAINING PROGRAM

## 2024-05-01 PROCEDURE — 6360000004 HC RX CONTRAST MEDICATION: Performed by: INTERNAL MEDICINE

## 2024-05-01 RX ORDER — ACETAMINOPHEN 325 MG/1
650 TABLET ORAL EVERY 4 HOURS PRN
Status: DISCONTINUED | OUTPATIENT
Start: 2024-05-01 | End: 2024-05-03 | Stop reason: HOSPADM

## 2024-05-01 RX ORDER — SODIUM CHLORIDE 0.9 % (FLUSH) 0.9 %
5-40 SYRINGE (ML) INJECTION EVERY 12 HOURS SCHEDULED
Status: DISCONTINUED | OUTPATIENT
Start: 2024-05-01 | End: 2024-05-03 | Stop reason: HOSPADM

## 2024-05-01 RX ORDER — SILDENAFIL CITRATE 20 MG/1
20 TABLET ORAL 3 TIMES DAILY
Status: DISCONTINUED | OUTPATIENT
Start: 2024-05-01 | End: 2024-05-02

## 2024-05-01 RX ORDER — SODIUM CHLORIDE 9 MG/ML
INJECTION, SOLUTION INTRAVENOUS PRN
Status: DISCONTINUED | OUTPATIENT
Start: 2024-05-01 | End: 2024-05-01 | Stop reason: SDUPTHER

## 2024-05-01 RX ORDER — SODIUM CHLORIDE 9 MG/ML
INJECTION, SOLUTION INTRAVENOUS PRN
Status: DISCONTINUED | OUTPATIENT
Start: 2024-05-01 | End: 2024-05-03 | Stop reason: HOSPADM

## 2024-05-01 RX ORDER — SODIUM CHLORIDE 9 MG/ML
INJECTION, SOLUTION INTRAVENOUS CONTINUOUS
Status: ACTIVE | OUTPATIENT
Start: 2024-05-01 | End: 2024-05-02

## 2024-05-01 RX ORDER — SODIUM CHLORIDE 0.9 % (FLUSH) 0.9 %
5-40 SYRINGE (ML) INJECTION PRN
Status: DISCONTINUED | OUTPATIENT
Start: 2024-05-01 | End: 2024-05-03 | Stop reason: HOSPADM

## 2024-05-01 RX ADMIN — IOPAMIDOL 100 ML: 755 INJECTION, SOLUTION INTRAVENOUS at 16:05

## 2024-05-01 RX ADMIN — FLECAINIDE ACETATE 100 MG: 100 TABLET ORAL at 20:45

## 2024-05-01 RX ADMIN — PANTOPRAZOLE SODIUM 40 MG: 40 TABLET, DELAYED RELEASE ORAL at 09:02

## 2024-05-01 RX ADMIN — FUROSEMIDE 40 MG: 40 TABLET ORAL at 09:01

## 2024-05-01 RX ADMIN — HEPARIN SODIUM 2000 UNITS: 1000 INJECTION INTRAVENOUS; SUBCUTANEOUS at 10:43

## 2024-05-01 RX ADMIN — AMOXICILLIN AND CLAVULANATE POTASSIUM 1 TABLET: 875; 125 TABLET, FILM COATED ORAL at 09:02

## 2024-05-01 RX ADMIN — HEPARIN SODIUM 12 UNITS/KG/HR: 10000 INJECTION, SOLUTION INTRAVENOUS at 07:18

## 2024-05-01 RX ADMIN — SERTRALINE HYDROCHLORIDE 100 MG: 100 TABLET ORAL at 09:01

## 2024-05-01 RX ADMIN — AMOXICILLIN AND CLAVULANATE POTASSIUM 1 TABLET: 875; 125 TABLET, FILM COATED ORAL at 20:45

## 2024-05-01 RX ADMIN — FLECAINIDE ACETATE 100 MG: 100 TABLET ORAL at 09:01

## 2024-05-01 RX ADMIN — SILDENAFIL 20 MG: 20 TABLET ORAL at 23:49

## 2024-05-01 RX ADMIN — SODIUM CHLORIDE: 9 INJECTION, SOLUTION INTRAVENOUS at 18:10

## 2024-05-01 RX ADMIN — METOPROLOL SUCCINATE 50 MG: 50 TABLET, EXTENDED RELEASE ORAL at 09:02

## 2024-05-01 RX ADMIN — EMPAGLIFLOZIN 10 MG: 10 TABLET, FILM COATED ORAL at 09:02

## 2024-05-01 RX ADMIN — DILTIAZEM HYDROCHLORIDE 120 MG: 120 CAPSULE, COATED, EXTENDED RELEASE ORAL at 09:01

## 2024-05-01 RX ADMIN — HYDROCHLOROTHIAZIDE 25 MG: 25 TABLET ORAL at 09:01

## 2024-05-01 ASSESSMENT — PAIN SCALES - GENERAL
PAINLEVEL_OUTOF10: 0

## 2024-05-01 NOTE — PROGRESS NOTES
Physical Therapy  Refusal    PT attempted to see pt for therapy session, pt adamantly declining stating \"I can walk, I've been walking with the nurses.\" PT edu on importance of maintaining mobility and preparing for home DC, pt continues to decline. \"Maybe later.\" Will follow up per POC.    Anuradha Malave PT, DPT, NCS, CSRS

## 2024-05-01 NOTE — ANESTHESIA PRE-OP
[facility]  2024, 2:16 PM    H&P Update    I have reviewed the history and physical and examined the patient and find no relevant changes.   I have reviewed with the patient and/or family the risks, benefits, and alternatives to the procedure.    Pre-sedation Assessment    Patient:  Flori Bartholomew   :   1955  Intended Procedure: cath and possible intervention  Procedure indications Short of breath pulmonary hypertension by echo possible CAD    [facility] nurses notes reviewed and agreed.  Medications reviewed  Allergies: No Known Allergies    Vitals:    24 0723 24 0901 24 1212 24 1327   BP: 115/65 (!) 119/59 123/67    Pulse: 56 58 60 60   Resp: 18  18    Temp: 97.9 °F (36.6 °C)  98.2 °F (36.8 °C)    TempSrc: Oral  Oral    SpO2: 94%  92%    Weight:       Height:           Pre-Procedure Assessment/Plan:  ASA 2 - Patient with mild systemic disease with no functional limitations  Mallampati score : Mallampati 1Level of Sedation Plan:Mild sedation    Post Procedure plan: Return to same level of care  Amari Etienne M.D.,FACC

## 2024-05-01 NOTE — PLAN OF CARE
Problem: Discharge Planning  Goal: Discharge to home or other facility with appropriate resources  Outcome: Progressing  Flowsheets (Taken 5/1/2024 1148)  Discharge to home or other facility with appropriate resources:   Identify barriers to discharge with patient and caregiver   Arrange for needed discharge resources and transportation as appropriate   Identify discharge learning needs (meds, wound care, etc)  Note: Heart cath later today.  NPO since midnight.  V/S stable.  Pt on room air.     Problem: Safety - Adult  Goal: Free from fall injury  Outcome: Progressing  Flowsheets (Taken 5/1/2024 1148)  Free From Fall Injury: Instruct family/caregiver on patient safety  Note: Pt is a Fall Risk. See Sim Fall Risk Score. Pt bed in low position and side rails up. Call light and belongings in reach. Pt encouraged to call for assistance. Will continue with hourly rounds for PO intake, pain needs, toileting, and repositioning as needed.         Problem: Respiratory - Adult  Goal: Achieves optimal ventilation and oxygenation  Outcome: Progressing  Flowsheets (Taken 5/1/2024 1148)  Achieves optimal ventilation and oxygenation:   Assess for changes in respiratory status   Assess for changes in mentation and behavior   Position to facilitate oxygenation and minimize respiratory effort   Encourage broncho-pulmonary hygiene including cough, deep breathe, incentive spirometry   Assess and instruct to report shortness of breath or any respiratory difficulty  Note: Pt spO2>90% on Room air. Pt endorses some SOB on ambulation but states she feels at baseline.     Problem: Chronic Conditions and Co-morbidities  Goal: Patient's chronic conditions and co-morbidity symptoms are monitored and maintained or improved  Outcome: Progressing  Flowsheets (Taken 5/1/2024 1148)  Care Plan - Patient's Chronic Conditions and Co-Morbidity Symptoms are Monitored and Maintained or Improved:   Monitor and assess patient's chronic conditions and

## 2024-05-01 NOTE — PROGRESS NOTES
Electrophysiology - PROGRESS NOTE    Admit Date: 4/27/2024     Chief Complaint: SOB     Interval History:   Patient seen and examined and notes reviewed. Patient is being followed for AF, AFL, acute on chronic HFpEF. Patient with a recent RFA/PVI of AF/PACs on 4/10/2024, and previously underwent RFA of tAFL on 1/12/2024. During her most recent procedure she was noted to have high left atrial pressure, with prominent V wave in the setting of normally functioning mitral regurgitation therefore she was started on Lasix afterwards. She was seen in the office 1 week after her procedure and her Lasix was decreased d/t concern for UTI. She was then seen a week later in the office with c/o increased SOB and her Lasix was increased back.  She stated that she had gone to work Friday night and had no issues, however on Saturday when she was there she felt worse and had worsening SOB. Patient then presented to ED with worsening SOB. She was started on IV Lasix and sildenafil. Sildenafil was stopped yesterday. Her limited echo yesterday showed an EF 55-60%, grade 1 diastolic dysfunction, and elevated SPAP. Patient remains on room air. Patient down 8 pounds since admission. Her net loss this admission thus far is -4.3L. Patient remains SB overnight.    In: 1010 [P.O.:1010]  Out: 1975    Wt Readings from Last 2 Encounters:   05/01/24 112.2 kg (247 lb 5.7 oz)   04/27/24 114 kg (251 lb 5.2 oz)         Data:   Scheduled Meds:   Scheduled Meds:   furosemide  40 mg Oral Daily    potassium chloride  40 mEq Oral Daily    empagliflozin  10 mg Oral Daily    amoxicillin-clavulanate  1 tablet Oral 2 times per day    dilTIAZem  120 mg Oral Daily    flecainide  100 mg Oral BID    hydroCHLOROthiazide  25 mg Oral Daily    levothyroxine  150 mcg Oral Daily    metoprolol succinate  50 mg Oral Daily    pantoprazole  40 mg Oral Daily    sertraline  100 mg Oral Daily    sodium chloride flush  5-40  mL IntraVENous 2 times per day    insulin lispro  0-4 Units SubCUTAneous TID WC    insulin lispro  0-4 Units SubCUTAneous Nightly     Continuous Infusions:   heparin (PORCINE) Infusion 12 Units/kg/hr (05/01/24 0718)    sodium chloride      dextrose       PRN Meds:.heparin (porcine), heparin (porcine), sodium chloride, sodium chloride flush, sodium chloride, potassium chloride **OR** potassium alternative oral replacement **OR** potassium chloride, magnesium sulfate, polyethylene glycol, acetaminophen **OR** acetaminophen, glucose, dextrose bolus **OR** dextrose bolus, glucagon (rDNA), dextrose  Continuous Infusions:   heparin (PORCINE) Infusion 12 Units/kg/hr (05/01/24 0718)    sodium chloride      dextrose         Intake/Output Summary (Last 24 hours) at 5/1/2024 0801  Last data filed at 5/1/2024 0723  Gross per 24 hour   Intake 1010 ml   Output 1975 ml   Net -965 ml       CBC:   Lab Results   Component Value Date/Time    WBC 6.9 04/30/2024 06:14 AM    HGB 13.4 04/30/2024 06:14 AM     04/30/2024 06:14 AM     BMP:  Lab Results   Component Value Date/Time     04/30/2024 06:14 AM    K 3.6 04/30/2024 06:14 AM    K 3.2 04/28/2024 10:28 AM    CL 97 04/30/2024 06:14 AM    CO2 30 04/30/2024 06:14 AM    BUN 24 04/30/2024 06:14 AM    CREATININE 0.8 04/30/2024 06:14 AM    GLUCOSE 154 04/30/2024 06:14 AM     INR:   Lab Results   Component Value Date/Time    INR 1.26 04/29/2024 11:17 AM    INR 0.98 04/03/2024 08:18 AM    INR 0.97 01/09/2024 04:17 AM        CARDIAC LABS  ENZYMES:No results for input(s): \"CKMB\", \"CKMBINDEX\", \"TROPONINI\" in the last 72 hours.    Invalid input(s): \"CKTOTAL;3\"  FASTING LIPID PANEL:  Lab Results   Component Value Date/Time    TSH 3.00 01/30/2024 02:51 PM     LIVER PROFILE:  Lab Results   Component Value Date/Time    AST 27 04/27/2024 06:00 PM    AST 25 01/08/2024 02:20 AM    ALT 24 04/27/2024 06:00 PM    ALT 21 01/08/2024 02:20 AM

## 2024-05-01 NOTE — PROCEDURES
CARDIOLOGY CATH LAB NOTE  [facility]         Right and Left Heart Catherization  Amari Etienne M.D.,[facility]  5/1/2024, 2:17 PM  Arias Michaud MD        Flori Bartholomew  69 y.o.  [id number]  Referring MD : Arias Michaud MD  Procedure : Right and left heart catherization   Left heart cath  Selective coronary angiogram  Left ventriculogram  Right ileofemoral angiogram  Right heart catheterization  Closure device (Angioseal)    Procedure performed by Dr. Amari Etienne MD, [facility]  Surgical assistant : none    Indication: Cardiac cath to rule out ischemic CAD, Possible angioplasty, The procedure and risks described to patient including risk of CVA, MI, bleeding, emergency surgery, death, pulmonary hypertension by echocardiogram.  Suggestive of coronary ischemia., Consent signed, or positive stress test  Anesthesia: Moderate sedation with Versed and Fentanyl IV  Any and all anesthesia was administered by my staff under my direct supervision and by a trained independent observer  Estimated blood loss :minimal  Specimen obtained : none    We initially approached the patient from the right radial artery after a negative Irvin's test.  We got easy access to the right radial artery however will restart with a left heart cath there was tremendous spasm in the radial artery and we could not advance our catheters.  We did appropriate imaging in the area.  We did administer intra-arterial nitroglycerin and it did not relieve the spasm.  We eventually had to abort that approach and we went from the right femoral artery.    As far as the right heart cath we were able to approach from the right brachial vein.  The Pompeii-Silvana catheter was advanced into the pulmonary artery and into the wedge position and appropriate samples of oxygen saturations were taken and we did measure the pressures in those areas.  At the end of the procedure we did perform the cardiac output using thermodilution technique.  We

## 2024-05-01 NOTE — PROGRESS NOTES
Hospitalist Progress Note    Assessment and Plan   Flori Bartholomew is a 69 y.o. female with history of atrial fibrillation s/p ablation 4/2024, hypertension, type 2 diabetes who presented on 4/27 with dyspnea on exertion.  Upon presentation to the ED patient was not hypoxic on room air but SpO2 dropped to 83% with exertion.  CTA chest negative for pulmonary embolism but concerning for pulmonary hypertension.     Dyspnea with exertion  Acute on chronic HFpEF, resolved  Severe pulmonary hypertension  --TTE 1/2024 SPAP 75 consistent with severe PAH  - Cardiology following  - TTE and CTA completed both with findings compatible with PAH  - L+RHC planned for today  --anti-pulm HTN meds to be determined afterwards    Atrial fibrillation s/p ablation 4/10/2024  - Controlled.  Continue metoprolol 50 mg daily, flecainide 100 mg twice daily, Cardizem 120 mg daily  - heparin gtt in anticipation of procedures, convert to DOAC afterwards    Type 2 diabetes with hyperglycemia, without long-term use of insulin  A1C 6.2 3/2024  - Holding home pioglitazone 30 mg daily  - On low sliding scale while admitted    Sinusitis   Treat empirically with augmentin    UTI   Abd pressure/discomfort consistent with her past UTIs. Cultures ordered, augmentin as above, no signs of sepsis    Hypertension  On Lasix 20 mg daily, hydrochlorothiazide 25 mg daily    Hypothyroidism  Continue Synthroid 150 mcg daily    Depression  Continue Zoloft 100 mg daily    CODE STATUS full  Next of kin: Son Carlos Manuel  DVT prophylaxis Eliquis    Subjective:     No acute clinical changes reported overnight. Vitals stable on room air. No new symptoms reported today by patient.    Objective:     Intake/Output Summary (Last 24 hours) at 5/1/2024 0942  Last data filed at 5/1/2024 0901  Gross per 24 hour   Intake 770 ml   Output 1875 ml   Net -1105 ml      Vitals:   Vitals:    05/01/24 0343 05/01/24 0556 05/01/24 0723 05/01/24 0901   BP: 116/71  115/65 (!) 119/59   Pulse:

## 2024-05-01 NOTE — PROGRESS NOTES
Occupational Therapy  Facility/Department: [facility] PCU  Daily Treatment Note  NAME: Flori Bartholomew  : 1955  MRN: [id number]    Date of Service: 2024    Discharge Recommendations:  24 hour supervision or assist, Home with Home health OT  OT Equipment Recommendations  ADL Assistive Devices: Shower Chair with back;Reacher  Other: pulse oximeter      Patient Diagnosis(es): There were no encounter diagnoses.    has a past medical history of Depression, Diabetes mellitus (HCC), GERD (gastroesophageal reflux disease), Hypertension, Paroxysmal atrial fibrillation (HCC), Thyroid disease, and Typical atrial flutter (HCC).  Past Surgical History:  has a past surgical history that includes Total knee arthroplasty; Cholecystectomy; and Cardiac electrophysiology mapping and ablation (2024).    Assessment    Assessment: Pt requiring encouragment for out of bed activity but agreeable once up. Pt with urine soaked brief requiring Mod A for doffing and donning new brief. ADL grooming completed in stance at sink leaning forward onto elbow at sink. Pt with resistance to use of RW completing functional mobility to and from bathroom with no device with flexed forward posture and furniture walking requiring CGA. Pt would benefit from 24hr assist and HHOT upon discharge.Continue OT per POC.  Discharge Recommendations: 24 hour supervision or assist;Home with Home health OT  Other: pulse oximeter      Plan   Occupational Therapy Plan  Times Per Week: 2-5x  Current Treatment Recommendations: Strengthening;Balance training;Functional mobility training;Endurance training;Safety education & training;Patient/Caregiver education & training;Equipment evaluation, education, & procurement;Gait training;Self-Care / ADL;Home management training     Restrictions     Position Activity Restriction  Other position/activity restrictions: up as tolerated     Subjective   Subjective  Subjective: Pt requiring heavy encouragment for  participation. Familly present and encouraging pt to get up. Pt cooperative once up. No pain reported.  Orientation  Overall Orientation Status: Within Functional Limits  Cognition  Overall Cognitive Status: WFL        Objective    Pt on room air throughout session maintaing O2 above 90% throughout session ranging from 93-96%     Bed Mobility Training  Bed Mobility Training: Yes  Overall Level of Assistance: Stand-by assistance;Supervision (increased time and effor with use of BR)  Supine to Sit: Supervision  Balance  Sitting: Intact (Pt sitting EOB and on toilet with supervision)  Standing: High guard (Pt in stance at sink with CGA with flexed forward posture requiring cues)  Transfer Training  Transfer Training: Yes  Overall Level of Assistance: Contact-guard assistance (cues for safe positioning transfering to and from EOB, toilet and room chair.)  Sit to Stand: Stand-by assistance  Stand to Sit: Contact-guard assistance (cues for eccentric control)  Toilet Transfer: Contact-guard assistance (with use of GB)  Gait  Gait Training: Yes  Overall Level of Assistance: Contact-guard assistance (Pt completing functional mobility to and from bathroom with no AD. Pt with flexed forward posture and reaching out for doorframe and bed during mobility. Pt educated on need for RW however pt stating \"I don't need that. I dont use one at home\")     ADL  Grooming: Stand by assistance  Grooming Skilled Clinical Factors: in stance at sink for washing hands and face  UE Dressing: Minimal assistance  UE Dressing Skilled Clinical Factors: Min A due to threading IV lines and ties  LE Dressing: Moderate assistance  LE Dressing Skilled Clinical Factors: assist for threading new briefs. Pt educated on possible need for reacher.  Toileting: Stand by assistance  Toileting Skilled Clinical Factors: Pt with pure wick on upon entry but requesting to use bathroom and voiding in toilet. Pt able to complete sandee care and briefs over hips with

## 2024-05-01 NOTE — DISCHARGE INSTRUCTIONS
Extra Heart Failure sites:     https://Tactiga.com/publication/?t=445938   --- this is American Heart Association interactive Healthier Living with Heart Failure guidebook.  Please click hyperlink or copy / paste link into search bar. Use your mouse to scroll through the pages.  Lots of information about weight monitoring, diet tips, activity, meds, etc     HF [place] amanda  -- this is a free smart phone amanda available for iPhone and Android download.  Use your phone to track sodium / fluid intake, zone tool symptom tracking, weights, medications, etc. Click on this hyperlink  HF [place] Amanda   for QR code for easy download.      DASH (Dietary Approach to Stop Hypertension) diet --  https://www.nhlbi.nih.gov/education/dash-eating-plan -- this diet is a flexible eating plan that promotes heart healthy eating style.  Click on hyperlink or copy / paste link into search bar.  Lots of low sodium recipes and tips.    https://www.Honestly.com.Hunie/recipes  -- more free recipes

## 2024-05-01 NOTE — PLAN OF CARE
Problem: Discharge Planning  Goal: Discharge to home or other facility with appropriate resources  4/30/2024 2146 by Diane Harris RN  Outcome: Progressing  4/30/2024 1227 by Lexus Delgado RN  Outcome: Progressing  Flowsheets (Taken 4/30/2024 1227)  Discharge to home or other facility with appropriate resources:   Identify barriers to discharge with patient and caregiver   Arrange for needed discharge resources and transportation as appropriate   Identify discharge learning needs (meds, wound care, etc)  Note: Pt on heparin gtt w/ cardiac cath scheduled for tomorrow.  V/s stable.  SB on cont telemetry.     Problem: Safety - Adult  Goal: Free from fall injury  4/30/2024 2146 by Diane Harris RN  Outcome: Progressing  4/30/2024 1227 by Lexus Delgado RN  Outcome: Progressing  Flowsheets (Taken 4/30/2024 1227)  Free From Fall Injury: Instruct family/caregiver on patient safety  Note: Pt is a Fall Risk. See Sim Fall Risk Score. Pt bed in low position and side rails up. Call light and belongings in reach. Pt encouraged to call for assistance. Will continue with hourly rounds for PO intake, pain needs, toileting, and repositioning as needed.         Problem: ABCDS Injury Assessment  Goal: Absence of physical injury  Outcome: Progressing     Problem: Respiratory - Adult  Goal: Achieves optimal ventilation and oxygenation  4/30/2024 2146 by Diane Harris RN  Outcome: Progressing  4/30/2024 1227 by Lexus Delgado RN  Outcome: Progressing  Flowsheets (Taken 4/30/2024 1227)  Achieves optimal ventilation and oxygenation:   Assess for changes in respiratory status   Assess for changes in mentation and behavior   Position to facilitate oxygenation and minimize respiratory effort   Encourage broncho-pulmonary hygiene including cough, deep breathe, incentive spirometry   Assess the need for suctioning and aspirate as needed   Assess and instruct to report shortness of breath or any respiratory  difficulty  Note: Pt on room air w/ spO2>90%.       Problem: Chronic Conditions and Co-morbidities  Goal: Patient's chronic conditions and co-morbidity symptoms are monitored and maintained or improved  Outcome: Progressing     Problem: Pain  Goal: Verbalizes/displays adequate comfort level or baseline comfort level  4/30/2024 2146 by Diane Harris RN  Outcome: Progressing  4/30/2024 1227 by Lexus Delgado RN  Outcome: Progressing  Flowsheets (Taken 4/30/2024 1227)  Verbalizes/displays adequate comfort level or baseline comfort level:   Encourage patient to monitor pain and request assistance   Assess pain using appropriate pain scale   Administer analgesics based on type and severity of pain and evaluate response   Implement non-pharmacological measures as appropriate and evaluate response  Note: Pt able to verbalize pain.  Denies pain at this time.     Problem: Pain  Goal: Verbalizes/displays adequate comfort level or baseline comfort level  4/30/2024 2146 by Diane Harris RN  Outcome: Progressing  4/30/2024 1227 by Lexus Delgado RN  Outcome: Progressing  Flowsheets (Taken 4/30/2024 1227)  Verbalizes/displays adequate comfort level or baseline comfort level:   Encourage patient to monitor pain and request assistance   Assess pain using appropriate pain scale   Administer analgesics based on type and severity of pain and evaluate response   Implement non-pharmacological measures as appropriate and evaluate response  Note: Pt able to verbalize pain.  Denies pain at this time.     Problem: Skin/Tissue Integrity  Goal: Absence of new skin breakdown  Description: 1.  Monitor for areas of redness and/or skin breakdown  2.  Assess vascular access sites hourly  3.  Every 4-6 hours minimum:  Change oxygen saturation probe site  4.  Every 4-6 hours:  If on nasal continuous positive airway pressure, respiratory therapy assess nares and determine need for appliance change or resting period.  Outcome:

## 2024-05-02 PROBLEM — I48.92 PAROXYSMAL ATRIAL FLUTTER (HCC): Status: ACTIVE | Noted: 2024-05-02

## 2024-05-02 LAB
ANION GAP SERPL CALCULATED.3IONS-SCNC: 10 MMOL/L (ref 3–16)
APTT BLD: 25.6 SEC (ref 22.1–36.4)
BUN SERPL-MCNC: 27 MG/DL (ref 7–20)
CALCIUM SERPL-MCNC: 9.5 MG/DL (ref 8.3–10.6)
CHLORIDE SERPL-SCNC: 98 MMOL/L (ref 99–110)
CO2 SERPL-SCNC: 27 MMOL/L (ref 21–32)
CREAT SERPL-MCNC: 0.8 MG/DL (ref 0.6–1.2)
GFR SERPLBLD CREATININE-BSD FMLA CKD-EPI: 79 ML/MIN/{1.73_M2}
GLUCOSE BLD-MCNC: 133 MG/DL (ref 70–99)
GLUCOSE BLD-MCNC: 158 MG/DL (ref 70–99)
GLUCOSE BLD-MCNC: 182 MG/DL (ref 70–99)
GLUCOSE BLD-MCNC: 201 MG/DL (ref 70–99)
GLUCOSE SERPL-MCNC: 158 MG/DL (ref 70–99)
MAGNESIUM SERPL-MCNC: 2.3 MG/DL (ref 1.8–2.4)
PERFORMED ON: ABNORMAL
POTASSIUM SERPL-SCNC: 3.9 MMOL/L (ref 3.5–5.1)
SODIUM SERPL-SCNC: 135 MMOL/L (ref 136–145)

## 2024-05-02 PROCEDURE — 6370000000 HC RX 637 (ALT 250 FOR IP): Performed by: INTERNAL MEDICINE

## 2024-05-02 PROCEDURE — 36415 COLL VENOUS BLD VENIPUNCTURE: CPT

## 2024-05-02 PROCEDURE — 80048 BASIC METABOLIC PNL TOTAL CA: CPT

## 2024-05-02 PROCEDURE — 99223 1ST HOSP IP/OBS HIGH 75: CPT | Performed by: INTERNAL MEDICINE

## 2024-05-02 PROCEDURE — 2060000000 HC ICU INTERMEDIATE R&B

## 2024-05-02 PROCEDURE — 6370000000 HC RX 637 (ALT 250 FOR IP): Performed by: STUDENT IN AN ORGANIZED HEALTH CARE EDUCATION/TRAINING PROGRAM

## 2024-05-02 PROCEDURE — 85730 THROMBOPLASTIN TIME PARTIAL: CPT

## 2024-05-02 PROCEDURE — 2580000003 HC RX 258: Performed by: INTERNAL MEDICINE

## 2024-05-02 PROCEDURE — 6370000000 HC RX 637 (ALT 250 FOR IP): Performed by: NURSE PRACTITIONER

## 2024-05-02 PROCEDURE — 6360000002 HC RX W HCPCS: Performed by: INTERNAL MEDICINE

## 2024-05-02 PROCEDURE — 83735 ASSAY OF MAGNESIUM: CPT

## 2024-05-02 PROCEDURE — 6370000000 HC RX 637 (ALT 250 FOR IP)

## 2024-05-02 PROCEDURE — 99233 SBSQ HOSP IP/OBS HIGH 50: CPT | Performed by: NURSE PRACTITIONER

## 2024-05-02 PROCEDURE — 99233 SBSQ HOSP IP/OBS HIGH 50: CPT | Performed by: INTERNAL MEDICINE

## 2024-05-02 RX ORDER — SILDENAFIL CITRATE 20 MG/1
20 TABLET ORAL 3 TIMES DAILY
Status: DISCONTINUED | OUTPATIENT
Start: 2024-05-02 | End: 2024-05-03 | Stop reason: HOSPADM

## 2024-05-02 RX ORDER — FUROSEMIDE 40 MG/1
40 TABLET ORAL 2 TIMES DAILY
Status: DISCONTINUED | OUTPATIENT
Start: 2024-05-02 | End: 2024-05-03 | Stop reason: HOSPADM

## 2024-05-02 RX ORDER — SPIRONOLACTONE 25 MG/1
12.5 TABLET ORAL DAILY
Status: DISCONTINUED | OUTPATIENT
Start: 2024-05-02 | End: 2024-05-03 | Stop reason: HOSPADM

## 2024-05-02 RX ORDER — FUROSEMIDE 10 MG/ML
20 INJECTION INTRAMUSCULAR; INTRAVENOUS ONCE
Status: COMPLETED | OUTPATIENT
Start: 2024-05-02 | End: 2024-05-02

## 2024-05-02 RX ADMIN — SERTRALINE HYDROCHLORIDE 100 MG: 100 TABLET ORAL at 08:58

## 2024-05-02 RX ADMIN — APIXABAN 5 MG: 5 TABLET, FILM COATED ORAL at 20:37

## 2024-05-02 RX ADMIN — FUROSEMIDE 40 MG: 40 TABLET ORAL at 08:58

## 2024-05-02 RX ADMIN — PANTOPRAZOLE SODIUM 40 MG: 40 TABLET, DELAYED RELEASE ORAL at 08:59

## 2024-05-02 RX ADMIN — FUROSEMIDE 20 MG: 10 INJECTION, SOLUTION INTRAMUSCULAR; INTRAVENOUS at 09:49

## 2024-05-02 RX ADMIN — SILDENAFIL 20 MG: 20 TABLET ORAL at 20:36

## 2024-05-02 RX ADMIN — HYDROCHLOROTHIAZIDE 25 MG: 25 TABLET ORAL at 08:58

## 2024-05-02 RX ADMIN — ACETAMINOPHEN 650 MG: 325 TABLET ORAL at 20:36

## 2024-05-02 RX ADMIN — SODIUM CHLORIDE, PRESERVATIVE FREE 10 ML: 5 INJECTION INTRAVENOUS at 20:37

## 2024-05-02 RX ADMIN — AMOXICILLIN AND CLAVULANATE POTASSIUM 1 TABLET: 875; 125 TABLET, FILM COATED ORAL at 20:37

## 2024-05-02 RX ADMIN — FLECAINIDE ACETATE 100 MG: 100 TABLET ORAL at 08:59

## 2024-05-02 RX ADMIN — POTASSIUM CHLORIDE 40 MEQ: 1500 TABLET, EXTENDED RELEASE ORAL at 08:59

## 2024-05-02 RX ADMIN — EMPAGLIFLOZIN 10 MG: 10 TABLET, FILM COATED ORAL at 08:59

## 2024-05-02 RX ADMIN — LEVOTHYROXINE SODIUM 150 MCG: 150 TABLET ORAL at 06:08

## 2024-05-02 RX ADMIN — SODIUM CHLORIDE, PRESERVATIVE FREE 10 ML: 5 INJECTION INTRAVENOUS at 09:00

## 2024-05-02 RX ADMIN — FUROSEMIDE 40 MG: 40 TABLET ORAL at 18:26

## 2024-05-02 RX ADMIN — AMOXICILLIN AND CLAVULANATE POTASSIUM 1 TABLET: 875; 125 TABLET, FILM COATED ORAL at 08:58

## 2024-05-02 RX ADMIN — SPIRONOLACTONE 12.5 MG: 25 TABLET ORAL at 10:53

## 2024-05-02 RX ADMIN — METOPROLOL SUCCINATE 50 MG: 50 TABLET, EXTENDED RELEASE ORAL at 08:58

## 2024-05-02 RX ADMIN — SILDENAFIL 20 MG: 20 TABLET ORAL at 08:58

## 2024-05-02 RX ADMIN — DILTIAZEM HYDROCHLORIDE 120 MG: 120 CAPSULE, COATED, EXTENDED RELEASE ORAL at 08:58

## 2024-05-02 RX ADMIN — APIXABAN 5 MG: 5 TABLET, FILM COATED ORAL at 12:00

## 2024-05-02 ASSESSMENT — PAIN DESCRIPTION - LOCATION: LOCATION: HEAD

## 2024-05-02 ASSESSMENT — PAIN SCALES - GENERAL
PAINLEVEL_OUTOF10: 0
PAINLEVEL_OUTOF10: 3
PAINLEVEL_OUTOF10: 0

## 2024-05-02 ASSESSMENT — PAIN DESCRIPTION - DESCRIPTORS: DESCRIPTORS: ACHING

## 2024-05-02 ASSESSMENT — ENCOUNTER SYMPTOMS
COUGH: 0
RHINORRHEA: 0
CHEST TIGHTNESS: 0
VOMITING: 0
SHORTNESS OF BREATH: 1
STRIDOR: 0
WHEEZING: 0
NAUSEA: 0

## 2024-05-02 NOTE — PROGRESS NOTES
Electrophysiology - PROGRESS NOTE    Admit Date: 4/27/2024     Chief Complaint: SOB, AF, PHT     Interval History:   Patient seen and examined and notes reviewed. Patient is being followed for SOB, pAF, PHT.  Patient with ongoing history of paroxysmal atrial fibrillation.  She underwent an RFA of AF and AFL on April 10, 2024.  During the procedure she was noted to have high left atrial pressures.  She was placed on Lasix.  In follow-up she continued to have complaints of worsening shortness of breath.  She was placed on outpatient furosemide.  She then presented with ongoing shortness of breath that was not getting any better.  She had an echo that showed an EF of 55 to 60%, grade 1 diastolic dysfunction and severe pulmonary hypertension.  She was placed on sildenafil a with symptomatic improvement.  The sildenafil was discontinued with plans to do a right and left heart cath.  She underwent a right and left heart cath on 5/1/2024 that showed moderate to severe pulmonary hypertension and mild coronary artery plaque.  She has diuresed with IV Lasix and has been transitioned to p.o.  Her weight is down 7 pounds and she is -7 L.  She has had no AF overnight.  She was restarted on her Eliquis.  Her QRS is wide on the flecainide.  Reviewed with Dr. Dejesus and we will discontinue this.    In: 480 [P.O.:480]  Out: 1950    Wt Readings from Last 2 Encounters:   05/02/24 112.6 kg (248 lb 3.8 oz)   04/27/24 114 kg (251 lb 5.2 oz)     Data:   Scheduled Meds:   Scheduled Meds:   furosemide  40 mg Oral BID    furosemide  20 mg IntraVENous Once    spironolactone  12.5 mg Oral Daily    apixaban  5 mg Oral BID    sodium chloride flush  5-40 mL IntraVENous 2 times per day    sodium chloride flush  5-40 mL IntraVENous 2 times per day    potassium chloride  40 mEq Oral Daily    empagliflozin  10 mg Oral Daily    amoxicillin-clavulanate  1 tablet Oral 2 times per day    dilTIAZem

## 2024-05-02 NOTE — PROGRESS NOTES
nonspecific changes.  Telemetry was personally reviewed, consistent with sinus bradycardia in the 50s.      Limited echo 4/29/2024: Normal LV, mild LVH, grade 1 diastolic dysfunction with normal LVEDP, mild RVD with normal function, mild TR, RVSP 61 (15).     Patient was admitted for acute on chronic HFpEF.  Cardiology was consulted, Dr. Goodwin assessed patient during the weekend and consulted me today for further evaluation and management of her HFpEF and symptoms.  Patient was started on sildenafil 20 mg p.o. every 8 hours yesterday (received 3 doses so far).  Today patient reports mild improvement with her symptoms.  Oxygen requirements have improved down at 1 L with 97% oxygen saturation.    Of note, patient reports excess salt in her diet at home (eats canned food and drinks Gatorade).    Cardiac CTA 4/30/2024: No evidence of ostial pulmonary vein stenosis (s/p afib ablation), elevated right hemidiaphragm (unchanged).    R/LHC (Dr Etienne) 5/1/24: RA 10, PA 58/20, W 19 (DPG 1), CI 2.2, LVEDP 13. Proximal RCA 20%, otherwise normal coronaries.     Interval history:  Patient reports no significant changes with her dyspnea.  Telemetry consistent with sinus bradycardia in the 50s with ventricular pacing.  Creatinine stable 0.8, K3.9.  I's and O's -1.4 L, weight unchanged.    Medications:   furosemide  40 mg Oral BID    spironolactone  12.5 mg Oral Daily    apixaban  5 mg Oral BID    sodium chloride flush  5-40 mL IntraVENous 2 times per day    sodium chloride flush  5-40 mL IntraVENous 2 times per day    potassium chloride  40 mEq Oral Daily    empagliflozin  10 mg Oral Daily    amoxicillin-clavulanate  1 tablet Oral 2 times per day    dilTIAZem  120 mg Oral Daily    flecainide  100 mg Oral BID    hydroCHLOROthiazide  25 mg Oral Daily    levothyroxine  150 mcg Oral Daily    metoprolol succinate  50 mg Oral Daily    pantoprazole  40 mg Oral Daily    sertraline  100 mg Oral Daily    sodium chloride flush   lower extremity edema   Abdomen:   Soft, non-tender, bowel sounds active all four quadrants,  no masses, no organomegaly   Extremities: Extremities normal, atraumatic, no cyanosis.    Pulses: 2+ and symmetric   Skin: Skin color, texture, turgor normal, no rashes or lesions   Pysch: Normal mood and affect   Neurologic: Normal gross motor and sensory exam.  Cranial nerves intact       Labs:   Recent Labs     04/30/24  0614 05/01/24  0754 05/01/24  1749 05/02/24  0828    138 132* 135*   K 3.6 3.8 3.6 3.9   BUN 24* 23* 21* 27*   CREATININE 0.8 0.8 0.9 0.8   CL 97* 98* 93* 98*   CO2 30 33* 29 27   GLUCOSE 154* 131* 128* 158*   CALCIUM 9.1 9.5 9.4 9.5   MG 2.20 2.40  --  2.30       Recent Labs     04/30/24  0614 05/01/24  0754 05/01/24  1749   WBC 6.9 7.1 9.3   HGB 13.4 13.6 13.8   HCT 40.4 41.0 42.2    200 220   MCV 93.6 94.1 94.1       No results for input(s): \"CHOLTOT\", \"TRIG\", \"HDL\", \"CHOLHDL\", \"LDL\" in the last 72 hours.    Invalid input(s): \"LIPIDCOMM\", \"VLDCHOL\"  No results for input(s): \"INR\" in the last 72 hours.    Invalid input(s): \"PT\", \"PTT\"    No results for input(s): \"CKTOTAL\", \"CKMB\", \"CKMBINDEX\", \"TROPONINI\" in the last 72 hours.  No results for input(s): \"BNP\" in the last 72 hours.  No results for input(s): \"NTPROBNP\" in the last 72 hours.  No results for input(s): \"TSH\" in the last 72 hours.    Imaging:       Assessment & Plan:     1.  Exertional dyspnea. Cardiac CTA excluded post A-fib ablation pulmonary vein stenosis as complication. LHC shows minimal CAD, hence ischemia is excluded. RHC suggests severe pulmonary venous hypertension (PVH; diastolic pressure gradient of 1 mmhg < 8) with mildly elevated filling pressures bilaterally (I suspect higher filling pressures on admission prior to diuresis), borderline low cardiac output.  I suspect patient's exertional dyspnea after A-fib ablation was most likely due to a combination of mild volume overload after procedure in the setting of

## 2024-05-02 NOTE — CONSULTS
Initial Pulmonary & Critical Care Consult Note    Patient Name: Flori Bartholomew   Patient : 1955   Date: 2024   Admit Date: 2024   CC:  LAYNE     Reason for Consult: Pulm hypertension   Requesting Physician: Heath Best MD      Subjective   HPI:    68-year-old female with past medical history HTN, DM, GERD, PAF/AFL s/p ablation 2024 initially presented to [facility] on  for 1-2 weeks of dyspnea and hypoxia with exertion since her cardiac ablation on 4/10.     Of note, echo in January of this year showed right ventricle pressure 75 concerning for severe pulmonary hypertension.    On arrival, she had CTA chest which showed dilated pulmonary artery and chronic elevation of the right hemidiaphragm. On arrival she was requiring 2L O2 NC with largely unremarkable lab work including BNP, Trop, CBC, BMP, LFTs, and Flu/COVID testing. Patient has been undergoing diuresis and on  she underwent right and left heart catheterization with Dr. Etienne.  This showed moderate to significant pulmonary hypertension as well as mild coronary artery calcification that did not require intervention.  There were no signs of mitral valve stenosis. Case discussed with Dr. Tena, cardiology, who has spoken with Dr. Etienne. Discussed concern for pulmonary venous hypertension given low diastolic pressure gradient    Pt currently sitting up comfortably in bed satting well on room air. She denies any acute complaints at this time including fever, chills, chest pain/tightness, or productive cough. Discussed results of her cardiac workup briefly and indications for pulmonology evaluation. She was arranging to have PFTs done prior to this hospitalization.     Past Medical History:      Diagnosis Date    Depression     Diabetes mellitus (HCC)     GERD (gastroesophageal reflux disease)     Hypertension     Paroxysmal atrial fibrillation (HCC)     Thyroid disease     Typical atrial flutter (HCC)         Past

## 2024-05-02 NOTE — PROGRESS NOTES
Physical Therapy    Attempted to see pt for PT.  Pt found supine.  Declines OOB at this time despite max encouragement.  Reports people have been in and out of her room all day.  \"I just want to rest.  Maybe later.\"  Will f/u later as schedule allows.  Savita Houston, PT [id number]

## 2024-05-02 NOTE — PLAN OF CARE
Problem: Discharge Planning  Goal: Discharge to home or other facility with appropriate resources  5/2/2024 0013 by Diane Harris RN  Outcome: Progressing  5/1/2024 1148 by Lexus Delgado RN  Outcome: Progressing  Flowsheets (Taken 5/1/2024 1148)  Discharge to home or other facility with appropriate resources:   Identify barriers to discharge with patient and caregiver   Arrange for needed discharge resources and transportation as appropriate   Identify discharge learning needs (meds, wound care, etc)  Note: Heart cath later today.  NPO since midnight.  V/S stable.  Pt on room air.     Problem: Safety - Adult  Goal: Free from fall injury  5/2/2024 0013 by Diane Harris RN  Outcome: Progressing  5/1/2024 1148 by Lexus Delgado RN  Outcome: Progressing  Flowsheets (Taken 5/1/2024 1148)  Free From Fall Injury: Instruct family/caregiver on patient safety  Note: Pt is a Fall Risk. See Sim Fall Risk Score. Pt bed in low position and side rails up. Call light and belongings in reach. Pt encouraged to call for assistance. Will continue with hourly rounds for PO intake, pain needs, toileting, and repositioning as needed.         Problem: ABCDS Injury Assessment  Goal: Absence of physical injury  Outcome: Progressing     Problem: Respiratory - Adult  Goal: Achieves optimal ventilation and oxygenation  5/2/2024 0013 by Diane Harris RN  Outcome: Progressing  5/1/2024 1148 by Lexus Delgado RN  Outcome: Progressing  Flowsheets (Taken 5/1/2024 1148)  Achieves optimal ventilation and oxygenation:   Assess for changes in respiratory status   Assess for changes in mentation and behavior   Position to facilitate oxygenation and minimize respiratory effort   Encourage broncho-pulmonary hygiene including cough, deep breathe, incentive spirometry   Assess and instruct to report shortness of breath or any respiratory difficulty  Note: Pt spO2>90% on Room air. Pt endorses some SOB on ambulation but states  she feels at baseline.     Problem: Chronic Conditions and Co-morbidities  Goal: Patient's chronic conditions and co-morbidity symptoms are monitored and maintained or improved  5/2/2024 0013 by Diane Harris RN  Outcome: Progressing  5/1/2024 1148 by Lexus Delgado RN  Outcome: Progressing  Flowsheets (Taken 5/1/2024 1148)  Care Plan - Patient's Chronic Conditions and Co-Morbidity Symptoms are Monitored and Maintained or Improved:   Monitor and assess patient's chronic conditions and comorbid symptoms for stability, deterioration, or improvement   Collaborate with multidisciplinary team to address chronic and comorbid conditions and prevent exacerbation or deterioration   Update acute care plan with appropriate goals if chronic or comorbid symptoms are exacerbated and prevent overall improvement and discharge     Problem: Pain  Goal: Verbalizes/displays adequate comfort level or baseline comfort level  5/2/2024 0013 by Diane Harris RN  Outcome: Progressing  5/1/2024 1148 by Lexus Delgado RN  Outcome: Progressing  Flowsheets (Taken 5/1/2024 1148)  Verbalizes/displays adequate comfort level or baseline comfort level:   Encourage patient to monitor pain and request assistance   Assess pain using appropriate pain scale   Implement non-pharmacological measures as appropriate and evaluate response  Note: Pt able to verbalize pain.  Denies pain at this time.     Problem: Skin/Tissue Integrity  Goal: Absence of new skin breakdown  Description: 1.  Monitor for areas of redness and/or skin breakdown  2.  Assess vascular access sites hourly  3.  Every 4-6 hours minimum:  Change oxygen saturation probe site  4.  Every 4-6 hours:  If on nasal continuous positive airway pressure, respiratory therapy assess nares and determine need for appliance change or resting period.  Outcome: Progressing     Problem: Cardiovascular - Adult  Goal: Maintains optimal cardiac output and hemodynamic stability  Outcome:

## 2024-05-02 NOTE — CARE COORDINATION
Case Management Assessment  Initial Evaluation    Date/Time of Evaluation: 5/2/2024 6:51 PM  Assessment Completed by: Margaret Barton    If patient is discharged prior to next notation, then this note serves as note for discharge by case management.    Patient Name: Flori Bartholomew                   YOB: 1955  Diagnosis: Dyspnea [R06.00]  Hypoxia [R09.02]                   Date / Time: 4/27/2024 11:25 PM    Patient Admission Status: Inpatient   Readmission Risk (Low < 19, Mod (19-27), High > 27): Readmission Risk Score: 10.3    Current PCP: Arias Michaud MD  PCP verified by CM? No    Chart Reviewed: Yes      History Provided by: Medical Record  Patient Orientation: Alert and Oriented    Patient Cognition: Alert    Hospitalization in the last 30 days (Readmission):  Yes    If yes, Readmission Assessment in CM Navigator will be completed.  Readmission Assessment  Number of Days since last admission?: 1-7 days (DC from [facility] 4/11, transfer to [facility] from [facility] 4/27)  Previous Disposition: Home with Family (DC from [facility] 4/11, transfer to [facility] from [facility] 4/27)  Who is being Interviewed: Patient (DC from [facility] 4/11, transfer to [facility] from [facility] 4/27)  What was the patient's/caregiver's perception as to why they think they needed to return back to the hospital?: Other (Comment) (DC from [facility] 4/11, transfer to [facility] from [facility] 4/27)  Did you visit your Primary Care Physician after you left the hospital, before you returned this time?: Yes  Did you see a specialist, such as Cardiac, Pulmonary, Orthopedic Physician, etc. after you left the hospital?: Yes  Who advised the patient to return to the hospital?: Self-referral  Does the patient report anything that got in the way of taking their medications?: No  In our efforts to provide the best possible care to you and others like you, can you think of anything that we could have done to help you after you left the hospital the  shower chair, reacher, and initial 24hr A.       The Plan for Transition of Care is related to the following treatment goals of Dyspnea [R06.00]  Hypoxia [R09.02]    IF APPLICABLE: The Patient and/or patient representative Flori and her family were provided with a choice of provider and agrees with the discharge plan. Freedom of choice list with basic dialogue that supports the patient's individualized plan of care/goals and shares the quality data associated with the providers was provided to: Patient   Patient Representative Name:       The Patient and/or Patient Representative Agree with the Discharge Plan? Yes    Margaret Barton  Case Management Department  Ph: [phone]

## 2024-05-02 NOTE — PLAN OF CARE
Problem: Discharge Planning  Goal: Discharge to home or other facility with appropriate resources  5/2/2024 1024 by Colin Sultana RN  Outcome: Progressing  Flowsheets (Taken 5/1/2024 1148 by Lexus Delgado, RN)  Discharge to home or other facility with appropriate resources:   Identify barriers to discharge with patient and caregiver   Arrange for needed discharge resources and transportation as appropriate   Identify discharge learning needs (meds, wound care, etc)  5/2/2024 0013 by Diane Harris RN  Outcome: Progressing     Problem: Safety - Adult  Goal: Free from fall injury  5/2/2024 1024 by Colin Sultana RN  Outcome: Progressing  Flowsheets (Taken 5/1/2024 1148 by Lexus Delgado RN)  Free From Fall Injury: Instruct family/caregiver on patient safety  5/2/2024 0013 by Diane Harris RN  Outcome: Progressing     Problem: ABCDS Injury Assessment  Goal: Absence of physical injury  5/2/2024 1024 by Colin Sultana RN  Outcome: Progressing  Flowsheets (Taken 4/28/2024 0953 by Anca Smiley RN)  Absence of Physical Injury: Implement safety measures based on patient assessment  5/2/2024 0013 by Diane Harris RN  Outcome: Progressing     Problem: Respiratory - Adult  Goal: Achieves optimal ventilation and oxygenation  5/2/2024 1024 by Colin Sultana RN  Outcome: Progressing  Flowsheets (Taken 5/1/2024 1148 by Lexus Delgado RN)  Achieves optimal ventilation and oxygenation:   Assess for changes in respiratory status   Assess for changes in mentation and behavior   Position to facilitate oxygenation and minimize respiratory effort   Encourage broncho-pulmonary hygiene including cough, deep breathe, incentive spirometry   Assess and instruct to report shortness of breath or any respiratory difficulty  5/2/2024 0013 by Diane Harris RN  Outcome: Progressing     Problem: Chronic Conditions and Co-morbidities  Goal: Patient's chronic conditions and

## 2024-05-02 NOTE — PROGRESS NOTES
Hospitalist Progress Note    Assessment and Plan   Flori Bartholomew is a 69 y.o. female with history of atrial fibrillation s/p ablation 4/2024, hypertension, type 2 diabetes who presented on 4/27 with dyspnea on exertion.  Upon presentation to the ED patient was not hypoxic on room air but SpO2 dropped to 83% with exertion.  CTA chest negative for pulmonary embolism but concerning for pulmonary hypertension.     Dyspnea with exertion  Acute on chronic HFpEF, resolved  Severe pulmonary hypertension  --TTE 1/2024 SPAP 75 consistent with severe PAH  - Cardiology following  - TTE and CTA completed both with findings compatible with PH  - L+RHC completed: minimal CAD, R heart pressures suggestive of pulmonary hypertension but not consistent with Group I PH (PAH)  --consulted Pulm on rec of cardiology  -Lasix 40 mg PO bid and Aldactone for diuresis    Atrial fibrillation s/p ablation 4/10/2024  - Controlled.  Continue metoprolol 50 mg daily, flecainide 100 mg twice daily, Cardizem 120 mg daily  - convert to DOAC    Type 2 diabetes with hyperglycemia, without long-term use of insulin  A1C 6.2 3/2024  - Holding home pioglitazone 30 mg daily  - On low sliding scale while admitted    Sinusitis   Treat empirically with augmentin    UTI   Abd pressure/discomfort consistent with her past UTIs. Cultures ordered, augmentin as above, no signs of sepsis    Hypertension  On Lasix 20 mg daily, hydrochlorothiazide 25 mg daily    Hypothyroidism  Continue Synthroid 150 mcg daily    Depression  Continue Zoloft 100 mg daily    CODE STATUS full  Next of kin: Caio Horowitz  DVT prophylaxis Eliquis    Subjective:     Tolerated cardiac cath yesterday. No acute clinical events reported overnight. Vitals remain stable on room air. No new symptoms reported today    Objective:     Intake/Output Summary (Last 24 hours) at 5/2/2024 1412  Last data filed at 5/2/2024 1116  Gross per 24 hour   Intake 480 ml   Output 1500 ml   Net -1020 ml      Vitals:

## 2024-05-03 ENCOUNTER — HOSPITAL ENCOUNTER (EMERGENCY)
Age: 69
Discharge: HOME | End: 2024-05-04
Attending: EMERGENCY MEDICINE
Payer: MEDICARE

## 2024-05-03 ENCOUNTER — APPOINTMENT (OUTPATIENT)
Dept: GENERAL RADIOLOGY | Age: 69
End: 2024-05-03
Payer: MEDICARE

## 2024-05-03 VITALS
OXYGEN SATURATION: 94 % | RESPIRATION RATE: 18 BRPM | HEART RATE: 64 BPM | SYSTOLIC BLOOD PRESSURE: 137 MMHG | WEIGHT: 241.84 LBS | TEMPERATURE: 98 F | BODY MASS INDEX: 40.29 KG/M2 | HEIGHT: 65 IN | DIASTOLIC BLOOD PRESSURE: 77 MMHG

## 2024-05-03 DIAGNOSIS — K59.00 CONSTIPATION, UNSPECIFIED CONSTIPATION TYPE: ICD-10-CM

## 2024-05-03 DIAGNOSIS — R11.0 NAUSEA: Primary | ICD-10-CM

## 2024-05-03 LAB
ANION GAP SERPL CALCULATED.3IONS-SCNC: 11 MMOL/L (ref 3–16)
APTT BLD: 29.5 SEC (ref 22.1–36.4)
BASOPHILS # BLD: 0 K/UL (ref 0–0.2)
BASOPHILS NFR BLD: 0.3 %
BUN SERPL-MCNC: 27 MG/DL (ref 7–20)
CALCIUM SERPL-MCNC: 10.1 MG/DL (ref 8.3–10.6)
CHLORIDE SERPL-SCNC: 91 MMOL/L (ref 99–110)
CO2 SERPL-SCNC: 31 MMOL/L (ref 21–32)
CREAT SERPL-MCNC: 0.7 MG/DL (ref 0.6–1.2)
DEPRECATED RDW RBC AUTO: 13.5 % (ref 12.4–15.4)
EOSINOPHIL # BLD: 0 K/UL (ref 0–0.6)
EOSINOPHIL NFR BLD: 0.4 %
GFR SERPLBLD CREATININE-BSD FMLA CKD-EPI: >90 ML/MIN/{1.73_M2}
GLUCOSE BLD-MCNC: 125 MG/DL (ref 70–99)
GLUCOSE SERPL-MCNC: 161 MG/DL (ref 70–99)
HCT VFR BLD AUTO: 45.3 % (ref 36–48)
HGB BLD-MCNC: 14.9 G/DL (ref 12–16)
IMM GRANULOCYTES # BLD: 0 K/UL (ref 0–0.2)
IMM GRANULOCYTES NFR BLD: 0.3 %
LYMPHOCYTES # BLD: 1.5 K/UL (ref 1–5.1)
LYMPHOCYTES NFR BLD: 13 %
MAGNESIUM SERPL-MCNC: 2.5 MG/DL (ref 1.8–2.4)
MCH RBC QN AUTO: 31.2 PG (ref 26–34)
MCHC RBC AUTO-ENTMCNC: 32.9 G/DL (ref 32–36.4)
MCV RBC AUTO: 94.8 FL (ref 80–100)
MONOCYTES # BLD: 1.1 K/UL (ref 0–1.3)
MONOCYTES NFR BLD: 9.3 %
NEUTROPHILS # BLD: 8.7 K/UL (ref 1.7–7.7)
NEUTROPHILS NFR BLD: 76.7 %
PERFORMED ON: ABNORMAL
PLATELET # BLD AUTO: 232 K/UL (ref 135–450)
PMV BLD AUTO: 11.1 FL (ref 5–10.5)
POTASSIUM SERPL-SCNC: 3.4 MMOL/L (ref 3.5–5.1)
RBC # BLD AUTO: 4.78 M/UL (ref 4–5.2)
REASON FOR REJECTION: NORMAL
REJECTED TEST: NORMAL
SODIUM SERPL-SCNC: 133 MMOL/L (ref 136–145)
WBC # BLD AUTO: 11.4 K/UL (ref 4–11)

## 2024-05-03 PROCEDURE — 74019 RADEX ABDOMEN 2 VIEWS: CPT

## 2024-05-03 PROCEDURE — 80053 COMPREHEN METABOLIC PANEL: CPT

## 2024-05-03 PROCEDURE — 84484 ASSAY OF TROPONIN QUANT: CPT

## 2024-05-03 PROCEDURE — 99233 SBSQ HOSP IP/OBS HIGH 50: CPT | Performed by: INTERNAL MEDICINE

## 2024-05-03 PROCEDURE — 80048 BASIC METABOLIC PNL TOTAL CA: CPT

## 2024-05-03 PROCEDURE — 6370000000 HC RX 637 (ALT 250 FOR IP): Performed by: INTERNAL MEDICINE

## 2024-05-03 PROCEDURE — 6360000002 HC RX W HCPCS: Performed by: EMERGENCY MEDICINE

## 2024-05-03 PROCEDURE — 6370000000 HC RX 637 (ALT 250 FOR IP): Performed by: STUDENT IN AN ORGANIZED HEALTH CARE EDUCATION/TRAINING PROGRAM

## 2024-05-03 PROCEDURE — 2580000003 HC RX 258: Performed by: INTERNAL MEDICINE

## 2024-05-03 PROCEDURE — 36415 COLL VENOUS BLD VENIPUNCTURE: CPT

## 2024-05-03 PROCEDURE — 99285 EMERGENCY DEPT VISIT HI MDM: CPT

## 2024-05-03 PROCEDURE — 83735 ASSAY OF MAGNESIUM: CPT

## 2024-05-03 PROCEDURE — 85730 THROMBOPLASTIN TIME PARTIAL: CPT

## 2024-05-03 PROCEDURE — 85025 COMPLETE CBC W/AUTO DIFF WBC: CPT

## 2024-05-03 PROCEDURE — 6360000002 HC RX W HCPCS: Performed by: STUDENT IN AN ORGANIZED HEALTH CARE EDUCATION/TRAINING PROGRAM

## 2024-05-03 PROCEDURE — 6370000000 HC RX 637 (ALT 250 FOR IP)

## 2024-05-03 PROCEDURE — 93005 ELECTROCARDIOGRAM TRACING: CPT | Performed by: EMERGENCY MEDICINE

## 2024-05-03 PROCEDURE — 96374 THER/PROPH/DIAG INJ IV PUSH: CPT

## 2024-05-03 PROCEDURE — 83690 ASSAY OF LIPASE: CPT

## 2024-05-03 PROCEDURE — 6370000000 HC RX 637 (ALT 250 FOR IP): Performed by: NURSE PRACTITIONER

## 2024-05-03 RX ORDER — METOPROLOL SUCCINATE 25 MG/1
50 TABLET, EXTENDED RELEASE ORAL DAILY
Qty: 30 TABLET | Refills: 0 | Status: SHIPPED | OUTPATIENT
Start: 2024-05-03

## 2024-05-03 RX ORDER — SILDENAFIL CITRATE 20 MG/1
20 TABLET ORAL 3 TIMES DAILY
Qty: 30 TABLET | Refills: 0 | Status: SHIPPED | OUTPATIENT
Start: 2024-05-03 | End: 2024-05-07 | Stop reason: SDUPTHER

## 2024-05-03 RX ORDER — FUROSEMIDE 40 MG/1
40 TABLET ORAL DAILY
Qty: 60 TABLET | Refills: 0 | Status: SHIPPED | OUTPATIENT
Start: 2024-05-03

## 2024-05-03 RX ORDER — DILTIAZEM HYDROCHLORIDE 120 MG/1
120 CAPSULE, COATED, EXTENDED RELEASE ORAL DAILY
Qty: 90 CAPSULE | Refills: 0 | Status: SHIPPED | OUTPATIENT
Start: 2024-05-03

## 2024-05-03 RX ORDER — PROCHLORPERAZINE EDISYLATE 5 MG/ML
5 INJECTION INTRAMUSCULAR; INTRAVENOUS ONCE
Status: COMPLETED | OUTPATIENT
Start: 2024-05-03 | End: 2024-05-03

## 2024-05-03 RX ORDER — PROMETHAZINE HYDROCHLORIDE 25 MG/ML
12.5 INJECTION, SOLUTION INTRAMUSCULAR; INTRAVENOUS EVERY 6 HOURS PRN
Status: DISCONTINUED | OUTPATIENT
Start: 2024-05-03 | End: 2024-05-03 | Stop reason: HOSPADM

## 2024-05-03 RX ORDER — SPIRONOLACTONE 25 MG/1
12.5 TABLET ORAL DAILY
Qty: 30 TABLET | Refills: 0 | Status: ON HOLD | OUTPATIENT
Start: 2024-05-04 | End: 2024-05-06

## 2024-05-03 RX ORDER — PANTOPRAZOLE SODIUM 40 MG/1
40 TABLET, DELAYED RELEASE ORAL DAILY
Qty: 30 TABLET | Refills: 0 | Status: SHIPPED | OUTPATIENT
Start: 2024-05-03 | End: 2024-05-07 | Stop reason: SDUPTHER

## 2024-05-03 RX ORDER — HYDROCHLOROTHIAZIDE 25 MG/1
25 TABLET ORAL DAILY
Qty: 30 TABLET | Refills: 0 | Status: ON HOLD | OUTPATIENT
Start: 2024-05-03 | End: 2024-05-06 | Stop reason: HOSPADM

## 2024-05-03 RX ORDER — LEVOTHYROXINE SODIUM 0.15 MG/1
150 TABLET ORAL DAILY
Qty: 30 TABLET | Refills: 0 | Status: SHIPPED | OUTPATIENT
Start: 2024-05-03

## 2024-05-03 RX ORDER — ONDANSETRON 2 MG/ML
4 INJECTION INTRAMUSCULAR; INTRAVENOUS EVERY 6 HOURS PRN
Status: DISCONTINUED | OUTPATIENT
Start: 2024-05-03 | End: 2024-05-03 | Stop reason: HOSPADM

## 2024-05-03 RX ADMIN — APIXABAN 5 MG: 5 TABLET, FILM COATED ORAL at 09:34

## 2024-05-03 RX ADMIN — LEVOTHYROXINE SODIUM 150 MCG: 150 TABLET ORAL at 05:02

## 2024-05-03 RX ADMIN — POTASSIUM CHLORIDE 40 MEQ: 1500 TABLET, EXTENDED RELEASE ORAL at 09:33

## 2024-05-03 RX ADMIN — SILDENAFIL 20 MG: 20 TABLET ORAL at 14:46

## 2024-05-03 RX ADMIN — DILTIAZEM HYDROCHLORIDE 120 MG: 120 CAPSULE, COATED, EXTENDED RELEASE ORAL at 09:34

## 2024-05-03 RX ADMIN — PANTOPRAZOLE SODIUM 40 MG: 40 TABLET, DELAYED RELEASE ORAL at 09:34

## 2024-05-03 RX ADMIN — POLYETHYLENE GLYCOL 3350 17 G: 17 POWDER, FOR SOLUTION ORAL at 06:43

## 2024-05-03 RX ADMIN — METOPROLOL SUCCINATE 50 MG: 50 TABLET, EXTENDED RELEASE ORAL at 09:34

## 2024-05-03 RX ADMIN — EMPAGLIFLOZIN 10 MG: 10 TABLET, FILM COATED ORAL at 09:34

## 2024-05-03 RX ADMIN — PROCHLORPERAZINE EDISYLATE 5 MG: 5 INJECTION INTRAMUSCULAR; INTRAVENOUS at 23:17

## 2024-05-03 RX ADMIN — SERTRALINE HYDROCHLORIDE 100 MG: 100 TABLET ORAL at 09:34

## 2024-05-03 RX ADMIN — FUROSEMIDE 40 MG: 40 TABLET ORAL at 09:34

## 2024-05-03 RX ADMIN — SPIRONOLACTONE 12.5 MG: 25 TABLET ORAL at 09:34

## 2024-05-03 RX ADMIN — SILDENAFIL 20 MG: 20 TABLET ORAL at 09:34

## 2024-05-03 RX ADMIN — ONDANSETRON 4 MG: 2 INJECTION INTRAMUSCULAR; INTRAVENOUS at 10:07

## 2024-05-03 RX ADMIN — HYDROCHLOROTHIAZIDE 25 MG: 25 TABLET ORAL at 09:34

## 2024-05-03 RX ADMIN — SODIUM CHLORIDE, PRESERVATIVE FREE 10 ML: 5 INJECTION INTRAVENOUS at 09:34

## 2024-05-03 RX ADMIN — AMOXICILLIN AND CLAVULANATE POTASSIUM 1 TABLET: 875; 125 TABLET, FILM COATED ORAL at 09:33

## 2024-05-03 ASSESSMENT — LIFESTYLE VARIABLES
HOW MANY STANDARD DRINKS CONTAINING ALCOHOL DO YOU HAVE ON A TYPICAL DAY: PATIENT DOES NOT DRINK
HOW OFTEN DO YOU HAVE A DRINK CONTAINING ALCOHOL: NEVER

## 2024-05-03 ASSESSMENT — PAIN SCALES - GENERAL
PAINLEVEL_OUTOF10: 0
PAINLEVEL_OUTOF10: 0

## 2024-05-03 ASSESSMENT — PAIN - FUNCTIONAL ASSESSMENT: PAIN_FUNCTIONAL_ASSESSMENT: 0-10

## 2024-05-03 NOTE — CARE COORDINATION
Discharge order noted. CM met with patient and granddaughter at bedside to discuss discharge planning. Pt declined [facility] at this time. CM spoke with pt and granddaughter about recs for shower chair and reacher and we to purchase, both verbalized an understanding. IMM signed. Granddaughter to transport. Pt's granddaughter and son live within walking distance of patient and can help as needed.     Cassandra Barrett RN, BSN, CM  Case Management Department  [phone]

## 2024-05-03 NOTE — PLAN OF CARE
Problem: Pain  Goal: Verbalizes/displays adequate comfort level or baseline comfort level  5/2/2024 1024 by Colin Sultana, RN  Outcome: Progressing  Flowsheets (Taken 5/1/2024 1148 by Lexus Delgado RN)  Verbalizes/displays adequate comfort level or baseline comfort level:   Encourage patient to monitor pain and request assistance   Assess pain using appropriate pain scale   Implement non-pharmacological measures as appropriate and evaluate response

## 2024-05-03 NOTE — DISCHARGE SUMMARY
Discharge order received. AVS printed and reviewed with patient. All questions answered. IV removed, patient belongings collected. Escorted out of unit via wheelchair. New medications picked up at outpatient pharmacy. Left facility with granddaughter in personal vehicle.  
01/08/2024 02:35 AM     Organism:   Lab Results   Component Value Date/Time    ORG Klebsiella variicola 04/28/2024 12:58 PM       Time Spent Discharging patient 35 minutes    Electronically signed by Heath Best MD on 5/3/2024 at 12:22 PM

## 2024-05-03 NOTE — PROGRESS NOTES
MD PAGE via [employer]:     Pt requesting medication for nausea. Please advise. Thank you!

## 2024-05-03 NOTE — PLAN OF CARE
Encourage broncho-pulmonary hygiene including cough, deep breathe, incentive spirometry   Assess and instruct to report shortness of breath or any respiratory difficulty     Problem: Chronic Conditions and Co-morbidities  Goal: Patient's chronic conditions and co-morbidity symptoms are monitored and maintained or improved  5/3/2024 1551 by Colin Sultana RN  Outcome: Completed  5/3/2024 1551 by Colin Sultana RN  Outcome: Completed  5/3/2024 1014 by Colin Sultana RN  Outcome: Progressing  Flowsheets (Taken 5/1/2024 1148 by Lexus Delgado RN)  Care Plan - Patient's Chronic Conditions and Co-Morbidity Symptoms are Monitored and Maintained or Improved:   Monitor and assess patient's chronic conditions and comorbid symptoms for stability, deterioration, or improvement   Collaborate with multidisciplinary team to address chronic and comorbid conditions and prevent exacerbation or deterioration   Update acute care plan with appropriate goals if chronic or comorbid symptoms are exacerbated and prevent overall improvement and discharge     Problem: Pain  Goal: Verbalizes/displays adequate comfort level or baseline comfort level  5/3/2024 1551 by Colin Sultana RN  Outcome: Completed  5/3/2024 1551 by Colin Sultana RN  Outcome: Completed  5/3/2024 1014 by Colin Sultana RN  Outcome: Progressing  Flowsheets (Taken 5/1/2024 1148 by Lexus Delgado RN)  Verbalizes/displays adequate comfort level or baseline comfort level:   Encourage patient to monitor pain and request assistance   Assess pain using appropriate pain scale   Implement non-pharmacological measures as appropriate and evaluate response     Problem: Skin/Tissue Integrity  Goal: Absence of new skin breakdown  Description: 1.  Monitor for areas of redness and/or skin breakdown  2.  Assess vascular access sites hourly  3.  Every 4-6 hours minimum:  Change oxygen saturation probe site  4.  Every 4-6 hours:  If on  stability and optimal renal function maintained  5/3/2024 1551 by Colin Sultana, RN  Outcome: Completed  5/3/2024 1551 by Colin Sultana, RN  Outcome: Completed  5/3/2024 1014 by Colin Sultana, RN  Outcome: Progressing  Flowsheets (Taken 5/2/2024 1024)  Hemodynamic stability and optimal renal function maintained:   Monitor labs and assess for signs and symptoms of volume excess or deficit   Monitor response to interventions for patient's volume status, including labs, urine output, blood pressure (other measures as available)   Encourage oral intake as appropriate

## 2024-05-03 NOTE — PROGRESS NOTES
lispro  0-4 Units SubCUTAneous Nightly       IV drips:   sodium chloride      dextrose         PRN:  promethazine, ondansetron, sodium chloride flush, sodium chloride flush, sodium chloride, acetaminophen, sodium chloride, sodium chloride flush, potassium chloride **OR** potassium alternative oral replacement **OR** potassium chloride, magnesium sulfate, polyethylene glycol, [DISCONTINUED] acetaminophen **OR** acetaminophen, glucose, dextrose bolus **OR** dextrose bolus, glucagon (rDNA), dextrose    Vitals:    05/02/24 2358 05/03/24 0501 05/03/24 0548 05/03/24 0752   BP: 122/73 132/79  137/77   Pulse: 64 63  64   Resp: 18 18  18   Temp: 98.1 °F (36.7 °C) 98.4 °F (36.9 °C)  98 °F (36.7 °C)   TempSrc: Oral Oral  Oral   SpO2: 92% 92%  94%   Weight:   109.7 kg (241 lb 13.5 oz)    Height:           Intake/Output Summary (Last 24 hours) at 5/3/2024 1351  Last data filed at 5/3/2024 0502  Gross per 24 hour   Intake 250 ml   Output 2275 ml   Net -2025 ml       I/O last 3 completed shifts:  In: 700 [P.O.:690; I.V.:10]  Out: 3450 [Urine:3450]  Wt Readings from Last 3 Encounters:   05/03/24 109.7 kg (241 lb 13.5 oz)   04/27/24 114 kg (251 lb 5.2 oz)   04/24/24 116.6 kg (257 lb)       Admit Wt: Weight - Scale: 116 kg (255 lb 11.7 oz)   Todays Wt: Weight - Scale: 109.7 kg (241 lb 13.5 oz)      Physical Exam:         General Appearance:  Alert, cooperative, no distress, appears stated age Appropriate weight   Head:  Normocephalic, without obvious abnormality, atraumatic   Neck: Supple, symmetrical, trachea midline, no adenopathy, thyroid: not enlarged, symmetric, no tenderness/mass/nodules, no carotid bruit.    Lungs:   Normal respiratory rate, lungs CTA bilaterally.    Chest Wall:  No tenderness or deformity   Heart:  Regular rhythm, rate is controlled, S1, S2 normal, there is no murmur, there is no rub or gallop, cannot assess jvd, no bilateral lower extremity edema   Abdomen:   Soft, non-tender, bowel sounds active all four  with any questions. If cardiology needs to be re-consulted during this admission, please contact our Cardiology Navigator at [phone] during normal business hours or the [facility] cardiology office/ at [phone] after hours or during the weekends/holidays.            I have personally reviewed the reports and images of labs, radiological studies, cardiac studies including ECG's and telemetry, current and old medical records. The note was completed using EMR and Dragon dictation system. Every effort was made to ensure accuracy; however, inadvertent computerized transcription errors may be present.    All questions and concerns were addressed to the patient/family. Alternatives to my treatment were discussed.     Thank you for allowing to us to participate in the care or Flori Bartholomew. Please call our service with questions.    Juan Alberto Tena MD, [facility], [facility] [place] [address]  Ph: [phone]  Fax: [phone]

## 2024-05-03 NOTE — PLAN OF CARE
Progressing  Flowsheets (Taken 5/2/2024 1024)  Electrolytes maintained within normal limits:   Monitor labs and assess patient for signs and symptoms of electrolyte imbalances   Administer electrolyte replacement as ordered   Monitor response to electrolyte replacements, including repeat lab results as appropriate  Goal: Hemodynamic stability and optimal renal function maintained  Outcome: Progressing  Flowsheets (Taken 5/2/2024 1024)  Hemodynamic stability and optimal renal function maintained:   Monitor labs and assess for signs and symptoms of volume excess or deficit   Monitor response to interventions for patient's volume status, including labs, urine output, blood pressure (other measures as available)   Encourage oral intake as appropriate

## 2024-05-04 VITALS
WEIGHT: 246.91 LBS | HEIGHT: 65 IN | BODY MASS INDEX: 41.14 KG/M2 | SYSTOLIC BLOOD PRESSURE: 120 MMHG | OXYGEN SATURATION: 94 % | DIASTOLIC BLOOD PRESSURE: 74 MMHG | HEART RATE: 67 BPM | RESPIRATION RATE: 20 BRPM | TEMPERATURE: 98.4 F

## 2024-05-04 LAB
ALBUMIN SERPL-MCNC: 4.4 G/DL (ref 3.4–5)
ALBUMIN/GLOB SERPL: 1.1 {RATIO} (ref 1.1–2.2)
ALP SERPL-CCNC: 107 U/L (ref 40–129)
ALT SERPL-CCNC: 20 U/L (ref 10–40)
ANION GAP SERPL CALCULATED.3IONS-SCNC: 16 MMOL/L (ref 3–16)
AST SERPL-CCNC: 30 U/L (ref 15–37)
BILIRUB SERPL-MCNC: 0.5 MG/DL (ref 0–1)
BUN SERPL-MCNC: 30 MG/DL (ref 7–20)
CALCIUM SERPL-MCNC: 9.8 MG/DL (ref 8.3–10.6)
CHLORIDE SERPL-SCNC: 93 MMOL/L (ref 99–110)
CO2 SERPL-SCNC: 26 MMOL/L (ref 21–32)
CREAT SERPL-MCNC: 0.8 MG/DL (ref 0.6–1.2)
GFR SERPLBLD CREATININE-BSD FMLA CKD-EPI: 79 ML/MIN/{1.73_M2}
GLUCOSE SERPL-MCNC: 162 MG/DL (ref 70–99)
LIPASE SERPL-CCNC: 22 U/L (ref 13–60)
POTASSIUM SERPL-SCNC: 4.3 MMOL/L (ref 3.5–5.1)
PROT SERPL-MCNC: 8.3 G/DL (ref 6.4–8.2)
SODIUM SERPL-SCNC: 135 MMOL/L (ref 136–145)
TROPONIN, HIGH SENSITIVITY: 16 NG/L (ref 0–14)

## 2024-05-04 RX ORDER — ONDANSETRON 4 MG/1
4 TABLET, ORALLY DISINTEGRATING ORAL 3 TIMES DAILY PRN
Qty: 12 TABLET | Refills: 0 | Status: SHIPPED | OUTPATIENT
Start: 2024-05-04

## 2024-05-04 NOTE — ED NOTES
Additional warm blankets applied/ call light in reach/ lights dimmed/ pt is passing gas/ states nausea is somewhat better.

## 2024-05-04 NOTE — ED NOTES
Warm blankets applied for comfort/ MD Hollins into see pt. NO bowel movement for several days per pt.

## 2024-05-04 NOTE — ED NOTES
Md Hollins into see pt again and discuss testing results and further plan of care w pt and family.

## 2024-05-04 NOTE — ED PROVIDER NOTES
[facility] Emergency Department      Pt Name: Flori Bartholomew  MRN: [id number]  Birthdate 1955  Date of evaluation: 5/3/2024  Provider: DARIEL RODRIGUES MD  CHIEF COMPLAINT  Chief Complaint   Patient presents with    Nausea     Reports feeling nauseated w \"gassy\" feeling today. EMS started IV 20 g to right hand and gave 4mg Zofran IVP. Denies cp or sob/ No bm in 6 days / just released from [facility] today 3p/      HPI  Flori Bartholomew is a 69 y.o. female who presents because of nausea and feeling \"gassy\".  She was just admitted to the hospital because of hypoxia.  She was discharged today.  She says she has not had a bowel movement since last Sunday.  She has nausea without vomiting.  She feels \"gassy\".  She denies abdominal pain.  She denies chest pain or shortness of breath.  She did receive Zofran by EMS providers which did not help the nausea.  She says she did mention the constipation while in the hospital but not much was done about it.  She had one laxative before leaving the hospital.  She is anticoagulated with Eliquis for atrial fibrillation    REVIEW OF SYSTEMS:  No fever, no weakness Pertinent positives and negatives as per the HPI.  All other pertinent review of systems reviewed and negative.  Nursing notes reviewed.    PAST MEDICAL HISTORY  Past Medical History:   Diagnosis Date    Depression     Diabetes mellitus (HCC)     GERD (gastroesophageal reflux disease)     Hypertension     Paroxysmal atrial fibrillation (HCC)     Thyroid disease     Typical atrial flutter (HCC)      SURGICAL HISTORY  Past Surgical History:   Procedure Laterality Date    CARDIAC ELECTROPHYSIOLOGY MAPPING AND ABLATION  01/12/2024    Typical atrial flutter    CARDIAC SURGERY      CHOLECYSTECTOMY      TOTAL KNEE ARTHROPLASTY       MEDICATIONS:  No current facility-administered medications on file prior to encounter.     Current Outpatient Medications on File Prior to Encounter   Medication Sig Dispense Refill     apixaban (ELIQUIS) 5 MG TABS tablet Take 1 tablet by mouth 2 times daily 60 tablet 0    empagliflozin (JARDIANCE) 10 MG tablet Take 1 tablet by mouth daily 30 tablet 0    metoprolol succinate (TOPROL XL) 25 MG extended release tablet Take 2 tablets by mouth daily 30 tablet 0    dilTIAZem (CARDIZEM CD) 120 MG extended release capsule Take 1 capsule by mouth daily 90 capsule 0    sildenafil (REVATIO) 20 MG tablet Take 1 tablet by mouth 3 times daily 30 tablet 0    spironolactone (ALDACTONE) 25 MG tablet Take 0.5 tablets by mouth daily 30 tablet 0    furosemide (LASIX) 40 MG tablet Take 1 tablet by mouth daily 60 tablet 0    hydroCHLOROthiazide (HYDRODIURIL) 25 MG tablet Take 1 tablet by mouth daily 30 tablet 0    pantoprazole (PROTONIX) 40 MG tablet Take 1 tablet by mouth daily 30 tablet 0    levothyroxine (SYNTHROID) 150 MCG tablet Take 1 tablet by mouth Daily 30 tablet 0    sertraline (ZOLOFT) 100 MG tablet Take 1 tablet by mouth daily      meloxicam (MOBIC) 15 MG tablet Take 1 tablet by mouth daily      pioglitazone (ACTOS) 30 MG tablet Take 1 tablet by mouth daily       ALLERGIES  Patient has no known allergies.  FAMILY HISTORY  History reviewed. No pertinent family history.  SOCIAL HISTORY:  Social History     Tobacco Use    Smoking status: Never    Smokeless tobacco: Never   Vaping Use    Vaping Use: Never used   Substance Use Topics    Alcohol use: Never    Drug use: Never     IMMUNIZATIONS:  Noncontributory    PHYSICAL EXAM  VITAL SIGNS:  Blood pressure (!) 141/64, pulse 69, temperature 98.4 °F (36.9 °C), temperature source Tympanic, resp. rate 22, height 1.651 m (5' 5\"), weight 112 kg (246 lb 14.6 oz), SpO2 92 %.  Constitutional:  69 y.o. female alert, cooperative  HENT:  Atraumatic, mucous membranes moist  Eyes:   Conjunctiva clear, no icterus  Neck:  Supple, no JVD, no signs of injury  Cardiovascular:  Regular, no rubs  Thorax & Lungs:  No accessory muscle usage, clear  Abdomen:  Soft, non distended, bowel

## 2024-05-04 NOTE — ED NOTES
Small amounts of formed bm in potty. Pt passed large amounts of gas and urinated. MD Hollins updated /Michelle care given to pt and returned pt to bed.

## 2024-05-05 ENCOUNTER — APPOINTMENT (OUTPATIENT)
Dept: CT IMAGING | Age: 69
DRG: 392 | End: 2024-05-05
Payer: MEDICARE

## 2024-05-05 ENCOUNTER — HOSPITAL ENCOUNTER (OUTPATIENT)
Age: 69
Setting detail: OBSERVATION
LOS: 1 days | Discharge: HOME | DRG: 392 | End: 2024-05-06
Attending: STUDENT IN AN ORGANIZED HEALTH CARE EDUCATION/TRAINING PROGRAM | Admitting: INTERNAL MEDICINE
Payer: MEDICARE

## 2024-05-05 DIAGNOSIS — E87.6 HYPOKALEMIA: ICD-10-CM

## 2024-05-05 DIAGNOSIS — K59.00 CONSTIPATION, UNSPECIFIED CONSTIPATION TYPE: ICD-10-CM

## 2024-05-05 DIAGNOSIS — R10.84 GENERALIZED ABDOMINAL PAIN: ICD-10-CM

## 2024-05-05 DIAGNOSIS — R11.0 INTRACTABLE NAUSEA: Primary | ICD-10-CM

## 2024-05-05 LAB
ALBUMIN SERPL-MCNC: 4.5 G/DL (ref 3.4–5)
ALBUMIN/GLOB SERPL: 1 {RATIO} (ref 1.1–2.2)
ALP SERPL-CCNC: 120 U/L (ref 40–129)
ALT SERPL-CCNC: 19 U/L (ref 10–40)
ANION GAP SERPL CALCULATED.3IONS-SCNC: 16 MMOL/L (ref 3–16)
AST SERPL-CCNC: 26 U/L (ref 15–37)
BACTERIA URNS QL MICRO: ABNORMAL /HPF
BASOPHILS # BLD: 0 K/UL (ref 0–0.2)
BASOPHILS NFR BLD: 0 %
BILIRUB DIRECT SERPL-MCNC: <0.2 MG/DL (ref 0–0.3)
BILIRUB INDIRECT SERPL-MCNC: NORMAL MG/DL (ref 0–1)
BILIRUB SERPL-MCNC: 0.6 MG/DL (ref 0–1)
BILIRUB UR QL STRIP.AUTO: NEGATIVE
BUN SERPL-MCNC: 32 MG/DL (ref 7–20)
CALCIUM SERPL-MCNC: 10.2 MG/DL (ref 8.3–10.6)
CHLORIDE SERPL-SCNC: 87 MMOL/L (ref 99–110)
CLARITY UR: ABNORMAL
CO2 SERPL-SCNC: 29 MMOL/L (ref 21–32)
COLOR UR: YELLOW
CREAT SERPL-MCNC: 0.7 MG/DL (ref 0.6–1.2)
DEPRECATED RDW RBC AUTO: 14.2 % (ref 12.4–15.4)
EKG ATRIAL RATE: 67 BPM
EKG DIAGNOSIS: NORMAL
EKG P AXIS: 48 DEGREES
EKG P-R INTERVAL: 176 MS
EKG Q-T INTERVAL: 432 MS
EKG QRS DURATION: 124 MS
EKG QTC CALCULATION (BAZETT): 456 MS
EKG R AXIS: -48 DEGREES
EKG T AXIS: 59 DEGREES
EKG VENTRICULAR RATE: 67 BPM
EOSINOPHIL # BLD: 0 K/UL (ref 0–0.6)
EOSINOPHIL NFR BLD: 0 %
EPI CELLS #/AREA URNS HPF: ABNORMAL /HPF (ref 0–5)
GFR SERPLBLD CREATININE-BSD FMLA CKD-EPI: >90 ML/MIN/{1.73_M2}
GLUCOSE BLD-MCNC: 153 MG/DL (ref 70–99)
GLUCOSE SERPL-MCNC: 142 MG/DL (ref 70–99)
GLUCOSE UR STRIP.AUTO-MCNC: 500 MG/DL
HCT VFR BLD AUTO: 45.8 % (ref 36–48)
HGB BLD-MCNC: 15.5 G/DL (ref 12–16)
HGB UR QL STRIP.AUTO: ABNORMAL
KETONES UR STRIP.AUTO-MCNC: 15 MG/DL
LEUKOCYTE ESTERASE UR QL STRIP.AUTO: NEGATIVE
LIPASE SERPL-CCNC: 28 U/L (ref 13–60)
LYMPHOCYTES # BLD: 1.3 K/UL (ref 1–5.1)
LYMPHOCYTES NFR BLD: 9 %
MAGNESIUM SERPL-MCNC: 3.3 MG/DL (ref 1.8–2.4)
MCH RBC QN AUTO: 31.2 PG (ref 26–34)
MCHC RBC AUTO-ENTMCNC: 33.8 G/DL (ref 31–36)
MCV RBC AUTO: 92.3 FL (ref 80–100)
MONOCYTES # BLD: 1.3 K/UL (ref 0–1.3)
MONOCYTES NFR BLD: 9 %
NEUTROPHILS # BLD: 11.4 K/UL (ref 1.7–7.7)
NEUTROPHILS NFR BLD: 81 %
NEUTS BAND NFR BLD MANUAL: 1 % (ref 0–7)
NITRITE UR QL STRIP.AUTO: NEGATIVE
PERFORMED ON: ABNORMAL
PH UR STRIP.AUTO: 6 [PH] (ref 5–8)
PLATELET # BLD AUTO: 233 K/UL (ref 135–450)
PLATELET BLD QL SMEAR: ADEQUATE
PMV BLD AUTO: 9.5 FL (ref 5–10.5)
POTASSIUM SERPL-SCNC: 3.3 MMOL/L (ref 3.5–5.1)
PROT SERPL-MCNC: 8.8 G/DL (ref 6.4–8.2)
PROT UR STRIP.AUTO-MCNC: 30 MG/DL
RBC # BLD AUTO: 4.96 M/UL (ref 4–5.2)
RBC #/AREA URNS HPF: ABNORMAL /HPF (ref 0–4)
SODIUM SERPL-SCNC: 132 MMOL/L (ref 136–145)
SP GR UR STRIP.AUTO: >=1.03 (ref 1–1.03)
UA COMPLETE W REFLEX CULTURE PNL UR: ABNORMAL
UA DIPSTICK W REFLEX MICRO PNL UR: YES
URN SPEC COLLECT METH UR: ABNORMAL
UROBILINOGEN UR STRIP-ACNC: 0.2 E.U./DL
WBC # BLD AUTO: 13.9 K/UL (ref 4–11)
WBC #/AREA URNS HPF: ABNORMAL /HPF (ref 0–5)

## 2024-05-05 PROCEDURE — 83735 ASSAY OF MAGNESIUM: CPT

## 2024-05-05 PROCEDURE — 1200000000 HC SEMI PRIVATE

## 2024-05-05 PROCEDURE — 83690 ASSAY OF LIPASE: CPT

## 2024-05-05 PROCEDURE — 85025 COMPLETE CBC W/AUTO DIFF WBC: CPT

## 2024-05-05 PROCEDURE — 6360000004 HC RX CONTRAST MEDICATION: Performed by: NURSE PRACTITIONER

## 2024-05-05 PROCEDURE — 6370000000 HC RX 637 (ALT 250 FOR IP)

## 2024-05-05 PROCEDURE — 80053 COMPREHEN METABOLIC PANEL: CPT

## 2024-05-05 PROCEDURE — 99285 EMERGENCY DEPT VISIT HI MDM: CPT

## 2024-05-05 PROCEDURE — 81001 URINALYSIS AUTO W/SCOPE: CPT

## 2024-05-05 PROCEDURE — 6360000002 HC RX W HCPCS: Performed by: NURSE PRACTITIONER

## 2024-05-05 PROCEDURE — 2580000003 HC RX 258

## 2024-05-05 PROCEDURE — 74177 CT ABD & PELVIS W/CONTRAST: CPT

## 2024-05-05 PROCEDURE — 93010 ELECTROCARDIOGRAM REPORT: CPT | Performed by: INTERNAL MEDICINE

## 2024-05-05 PROCEDURE — 96374 THER/PROPH/DIAG INJ IV PUSH: CPT

## 2024-05-05 PROCEDURE — 6360000002 HC RX W HCPCS: Performed by: STUDENT IN AN ORGANIZED HEALTH CARE EDUCATION/TRAINING PROGRAM

## 2024-05-05 PROCEDURE — 96375 TX/PRO/DX INJ NEW DRUG ADDON: CPT

## 2024-05-05 RX ORDER — PANTOPRAZOLE SODIUM 40 MG/1
40 TABLET, DELAYED RELEASE ORAL DAILY
Status: DISCONTINUED | OUTPATIENT
Start: 2024-05-06 | End: 2024-05-06 | Stop reason: HOSPADM

## 2024-05-05 RX ORDER — SODIUM CHLORIDE 0.9 % (FLUSH) 0.9 %
5-40 SYRINGE (ML) INJECTION EVERY 12 HOURS SCHEDULED
Status: DISCONTINUED | OUTPATIENT
Start: 2024-05-05 | End: 2024-05-06 | Stop reason: HOSPADM

## 2024-05-05 RX ORDER — HYDROCHLOROTHIAZIDE 25 MG/1
25 TABLET ORAL DAILY
Status: DISCONTINUED | OUTPATIENT
Start: 2024-05-06 | End: 2024-05-06

## 2024-05-05 RX ORDER — 0.9 % SODIUM CHLORIDE 0.9 %
500 INTRAVENOUS SOLUTION INTRAVENOUS ONCE
Status: DISCONTINUED | OUTPATIENT
Start: 2024-05-05 | End: 2024-05-06 | Stop reason: HOSPADM

## 2024-05-05 RX ORDER — PROMETHAZINE HYDROCHLORIDE 25 MG/ML
12.5 INJECTION, SOLUTION INTRAMUSCULAR; INTRAVENOUS EVERY 6 HOURS PRN
Status: DISCONTINUED | OUTPATIENT
Start: 2024-05-05 | End: 2024-05-06 | Stop reason: HOSPADM

## 2024-05-05 RX ORDER — FUROSEMIDE 40 MG/1
40 TABLET ORAL DAILY
Status: DISCONTINUED | OUTPATIENT
Start: 2024-05-06 | End: 2024-05-06 | Stop reason: HOSPADM

## 2024-05-05 RX ORDER — POTASSIUM CHLORIDE 20 MEQ/1
20 TABLET, EXTENDED RELEASE ORAL ONCE
Status: DISCONTINUED | OUTPATIENT
Start: 2024-05-05 | End: 2024-05-05

## 2024-05-05 RX ORDER — INSULIN LISPRO 100 [IU]/ML
0-4 INJECTION, SOLUTION INTRAVENOUS; SUBCUTANEOUS
Status: DISCONTINUED | OUTPATIENT
Start: 2024-05-06 | End: 2024-05-06 | Stop reason: HOSPADM

## 2024-05-05 RX ORDER — DILTIAZEM HYDROCHLORIDE 120 MG/1
120 CAPSULE, COATED, EXTENDED RELEASE ORAL DAILY
Status: DISCONTINUED | OUTPATIENT
Start: 2024-05-06 | End: 2024-05-06 | Stop reason: HOSPADM

## 2024-05-05 RX ORDER — DROPERIDOL 2.5 MG/ML
1.25 INJECTION, SOLUTION INTRAMUSCULAR; INTRAVENOUS ONCE
Status: COMPLETED | OUTPATIENT
Start: 2024-05-05 | End: 2024-05-05

## 2024-05-05 RX ORDER — INSULIN LISPRO 100 [IU]/ML
0-4 INJECTION, SOLUTION INTRAVENOUS; SUBCUTANEOUS NIGHTLY
Status: DISCONTINUED | OUTPATIENT
Start: 2024-05-05 | End: 2024-05-06 | Stop reason: HOSPADM

## 2024-05-05 RX ORDER — GLUCAGON 1 MG/ML
1 KIT INJECTION PRN
Status: DISCONTINUED | OUTPATIENT
Start: 2024-05-05 | End: 2024-05-06 | Stop reason: HOSPADM

## 2024-05-05 RX ORDER — SIMETHICONE 80 MG
80 TABLET,CHEWABLE ORAL 4 TIMES DAILY
Status: DISCONTINUED | OUTPATIENT
Start: 2024-05-05 | End: 2024-05-06 | Stop reason: HOSPADM

## 2024-05-05 RX ORDER — PROMETHAZINE HYDROCHLORIDE 25 MG/1
12.5 TABLET ORAL EVERY 6 HOURS PRN
Status: DISCONTINUED | OUTPATIENT
Start: 2024-05-05 | End: 2024-05-06 | Stop reason: HOSPADM

## 2024-05-05 RX ORDER — POLYETHYLENE GLYCOL 3350 17 G/17G
17 POWDER, FOR SOLUTION ORAL 2 TIMES DAILY
Status: DISCONTINUED | OUTPATIENT
Start: 2024-05-05 | End: 2024-05-06 | Stop reason: HOSPADM

## 2024-05-05 RX ORDER — ONDANSETRON 2 MG/ML
4 INJECTION INTRAMUSCULAR; INTRAVENOUS ONCE
Status: COMPLETED | OUTPATIENT
Start: 2024-05-05 | End: 2024-05-05

## 2024-05-05 RX ORDER — ONDANSETRON 4 MG/1
4 TABLET, ORALLY DISINTEGRATING ORAL EVERY 8 HOURS PRN
Status: DISCONTINUED | OUTPATIENT
Start: 2024-05-05 | End: 2024-05-06

## 2024-05-05 RX ORDER — LEVOTHYROXINE SODIUM 0.15 MG/1
150 TABLET ORAL DAILY
Status: DISCONTINUED | OUTPATIENT
Start: 2024-05-06 | End: 2024-05-06 | Stop reason: HOSPADM

## 2024-05-05 RX ORDER — DEXTROSE MONOHYDRATE 100 MG/ML
INJECTION, SOLUTION INTRAVENOUS CONTINUOUS PRN
Status: DISCONTINUED | OUTPATIENT
Start: 2024-05-05 | End: 2024-05-06 | Stop reason: HOSPADM

## 2024-05-05 RX ORDER — SILDENAFIL CITRATE 20 MG/1
20 TABLET ORAL 3 TIMES DAILY
Status: DISCONTINUED | OUTPATIENT
Start: 2024-05-05 | End: 2024-05-06 | Stop reason: HOSPADM

## 2024-05-05 RX ORDER — POTASSIUM CHLORIDE 7.45 MG/ML
10 INJECTION INTRAVENOUS ONCE
Status: COMPLETED | OUTPATIENT
Start: 2024-05-05 | End: 2024-05-06

## 2024-05-05 RX ORDER — SPIRONOLACTONE 25 MG/1
12.5 TABLET ORAL DAILY
Status: DISCONTINUED | OUTPATIENT
Start: 2024-05-06 | End: 2024-05-06

## 2024-05-05 RX ORDER — ACETAMINOPHEN 325 MG/1
650 TABLET ORAL EVERY 6 HOURS PRN
Status: DISCONTINUED | OUTPATIENT
Start: 2024-05-05 | End: 2024-05-06 | Stop reason: HOSPADM

## 2024-05-05 RX ORDER — SERTRALINE HYDROCHLORIDE 100 MG/1
100 TABLET, FILM COATED ORAL DAILY
Status: DISCONTINUED | OUTPATIENT
Start: 2024-05-06 | End: 2024-05-06 | Stop reason: HOSPADM

## 2024-05-05 RX ORDER — SODIUM CHLORIDE 9 MG/ML
INJECTION, SOLUTION INTRAVENOUS PRN
Status: DISCONTINUED | OUTPATIENT
Start: 2024-05-05 | End: 2024-05-06 | Stop reason: HOSPADM

## 2024-05-05 RX ORDER — ONDANSETRON 2 MG/ML
4 INJECTION INTRAMUSCULAR; INTRAVENOUS EVERY 6 HOURS PRN
Status: DISCONTINUED | OUTPATIENT
Start: 2024-05-05 | End: 2024-05-06

## 2024-05-05 RX ORDER — METOPROLOL SUCCINATE 50 MG/1
50 TABLET, EXTENDED RELEASE ORAL DAILY
Status: DISCONTINUED | OUTPATIENT
Start: 2024-05-06 | End: 2024-05-06 | Stop reason: HOSPADM

## 2024-05-05 RX ORDER — SENNA AND DOCUSATE SODIUM 50; 8.6 MG/1; MG/1
2 TABLET, FILM COATED ORAL 2 TIMES DAILY
Status: DISCONTINUED | OUTPATIENT
Start: 2024-05-05 | End: 2024-05-06 | Stop reason: HOSPADM

## 2024-05-05 RX ORDER — ACETAMINOPHEN 650 MG/1
650 SUPPOSITORY RECTAL EVERY 6 HOURS PRN
Status: DISCONTINUED | OUTPATIENT
Start: 2024-05-05 | End: 2024-05-06 | Stop reason: HOSPADM

## 2024-05-05 RX ORDER — SODIUM CHLORIDE 0.9 % (FLUSH) 0.9 %
5-40 SYRINGE (ML) INJECTION PRN
Status: DISCONTINUED | OUTPATIENT
Start: 2024-05-05 | End: 2024-05-06 | Stop reason: HOSPADM

## 2024-05-05 RX ADMIN — SIMETHICONE 80 MG: 80 TABLET, CHEWABLE ORAL at 23:05

## 2024-05-05 RX ADMIN — MINERAL OIL 330 ML: 1000 LIQUID ORAL at 23:40

## 2024-05-05 RX ADMIN — POLYETHYLENE GLYCOL 3350 17 G: 17 POWDER, FOR SOLUTION ORAL at 23:05

## 2024-05-05 RX ADMIN — SENNOSIDES AND DOCUSATE SODIUM 2 TABLET: 50; 8.6 TABLET ORAL at 23:05

## 2024-05-05 RX ADMIN — POTASSIUM CHLORIDE 10 MEQ: 10 INJECTION, SOLUTION INTRAVENOUS at 21:42

## 2024-05-05 RX ADMIN — SODIUM CHLORIDE, PRESERVATIVE FREE 10 ML: 5 INJECTION INTRAVENOUS at 22:46

## 2024-05-05 RX ADMIN — ONDANSETRON 4 MG: 2 INJECTION INTRAMUSCULAR; INTRAVENOUS at 19:38

## 2024-05-05 RX ADMIN — DROPERIDOL 1.25 MG: 2.5 INJECTION, SOLUTION INTRAMUSCULAR; INTRAVENOUS at 21:39

## 2024-05-05 RX ADMIN — APIXABAN 5 MG: 5 TABLET, FILM COATED ORAL at 23:05

## 2024-05-05 RX ADMIN — IOPAMIDOL 75 ML: 755 INJECTION, SOLUTION INTRAVENOUS at 20:36

## 2024-05-05 RX ADMIN — SILDENAFIL 20 MG: 20 TABLET ORAL at 23:39

## 2024-05-05 ASSESSMENT — PAIN SCALES - GENERAL: PAINLEVEL_OUTOF10: 6

## 2024-05-05 ASSESSMENT — PAIN - FUNCTIONAL ASSESSMENT
PAIN_FUNCTIONAL_ASSESSMENT: 0-10
PAIN_FUNCTIONAL_ASSESSMENT: PREVENTS OR INTERFERES SOME ACTIVE ACTIVITIES AND ADLS

## 2024-05-05 ASSESSMENT — PAIN DESCRIPTION - DESCRIPTORS: DESCRIPTORS: DISCOMFORT

## 2024-05-05 ASSESSMENT — PAIN DESCRIPTION - ONSET: ONSET: ON-GOING

## 2024-05-05 ASSESSMENT — PAIN DESCRIPTION - PAIN TYPE: TYPE: ACUTE PAIN

## 2024-05-05 ASSESSMENT — PAIN DESCRIPTION - LOCATION: LOCATION: ABDOMEN

## 2024-05-05 ASSESSMENT — PAIN DESCRIPTION - FREQUENCY: FREQUENCY: CONTINUOUS

## 2024-05-05 NOTE — ED PROVIDER NOTES
ED Attending Attestation Note     Date of evaluation: 5/5/2024    I have discussed the case with the MARTÍNEZ. I have personally performed a history, physical exam, and my own medical decision making. I have reviewed the note and agree with the findings and plan. My assessment reveals with past medical history of diabetes, hypertension, A-fib on Eliquis presenting with constipation.  She reportedly has not had a bowel movement in the last 1 to 2 weeks.  She was seen in an urgent care where a KUB was obtained that showed stool burden however she was discharged without any bowel regimen.  She has tried mag citrate and other laxatives at home without improvement.  CT scan of the abdomen and pelvis was obtained today and does not show any acute abnormality.  Unfortunately, the patient continues to feel nauseous and not tolerate p.o. despite multiple antiemetics.  She will be admitted for further IV rehydration and attempt for other antiemetics so that she is able to tolerate p.o.       Elian Galan MD  05/05/24 1585

## 2024-05-05 NOTE — ED PROVIDER NOTES
THE [facility]  EMERGENCY DEPARTMENT ENCOUNTER          NURSE PRACTITIONER NOTE     Date of evaluation: 5/5/2024    Chief Complaint     Constipation (Pt was released from Hospital on Friday, but states that she hasn't had a bowel movement in 6 days. C/o constipation)      History of Present Illness     Flori Bartholomew is a 69 y.o. female with a past medical history of diabetes, hypertension dismal A-fib/flutter, status post ablation, on Eliquis, who presents to the emergency department with complaints of constipation.  States that she has not had a bowel movement in 6 to 7 days.  Presents complaining of abdominal cramping, nausea without episodes of vomiting, and feeling generally unwell.  The patient was recently admitted from 4/27 to 5/3 with shortness of breath and got a new diagnosis of pulmonary hypertension.  She states she was started on multiple medications.  She had not had a bowel movement for several days in the hospital but did not think anything of it.  The day that she was discharged noticed that she had not had a bowel movement and went to [facility], had a KUB that showed moderate stool burden.  Was given enema without any significant stool output.  Was discharged with Zofran.  Granddaughter states they have tried mag citrate and other laxatives at home.  Still not had a bowel movement    With the exception of the above, there are no aggravating or alleviating factors.    Review of Systems     Pertinent positive and negative findings as documented above in HPI, otherwise all other systems were reviewed and negative     Past Medical, Surgical, Family, and Social History     Medical History:   Past Medical History:   Diagnosis Date    Depression     Diabetes mellitus (HCC)     GERD (gastroesophageal reflux disease)     Hypertension     Paroxysmal atrial fibrillation (HCC)     Thyroid disease     Typical atrial flutter (HCC)        Surgical History:   Past Surgical History:   Procedure  Extraocular Movements: Extraocular movements intact.   Cardiovascular:      Rate and Rhythm: Normal rate and regular rhythm.   Pulmonary:      Effort: Pulmonary effort is normal.      Breath sounds: Normal breath sounds.   Abdominal:      Palpations: Abdomen is soft.      Tenderness: There is no abdominal tenderness.   Genitourinary:     Comments: No fecal impaction  Musculoskeletal:      Cervical back: Normal range of motion.   Skin:     General: Skin is warm and dry.   Neurological:      Mental Status: She is alert and oriented to person, place, and time.   Psychiatric:         Behavior: Behavior normal.         Diagnostic Results     RADIOLOGY:  CT ABDOMEN PELVIS W IV CONTRAST Additional Contrast? None   Final Result      1.  No acute CT abnormality in the abdomen or pelvis.   2.  No evidence of bowel obstruction.   3.  Elevation of the right hemidiaphragm with right upper lung atelectasis/consolidation.         Electronically signed by Juni Pedraza MD          LABS:   Results for orders placed or performed during the hospital encounter of 05/05/24   CBC with Auto Differential   Result Value Ref Range    WBC 13.9 (H) 4.0 - 11.0 K/uL    RBC 4.96 4.00 - 5.20 M/uL    Hemoglobin 15.5 12.0 - 16.0 g/dL    Hematocrit 45.8 36.0 - 48.0 %    MCV 92.3 80.0 - 100.0 fL    MCH 31.2 26.0 - 34.0 pg    MCHC 33.8 31.0 - 36.0 g/dL    RDW 14.2 12.4 - 15.4 %    Platelets 233 135 - 450 K/uL    MPV 9.5 5.0 - 10.5 fL    PLATELET SLIDE REVIEW Adequate     Neutrophils % 81.0 %    Lymphocytes % 9.0 %    Monocytes % 9.0 %    Eosinophils % 0.0 %    Basophils % 0.0 %    Neutrophils Absolute 11.4 (H) 1.7 - 7.7 K/uL    Lymphocytes Absolute 1.3 1.0 - 5.1 K/uL    Monocytes Absolute 1.3 0.0 - 1.3 K/uL    Eosinophils Absolute 0.0 0.0 - 0.6 K/uL    Basophils Absolute 0.0 0.0 - 0.2 K/uL    Bands Relative 1 0 - 7 %   CMP w/ Reflex to MG   Result Value Ref Range    Sodium 132 (L) 136 - 145 mmol/L    Potassium reflex Magnesium 3.3 (L) 3.5 - 5.1 mmol/L

## 2024-05-05 NOTE — ED NOTES
Unable to get IV or blood work, Jack SULLIVAN notified  Ultra sound guided IV needed.      Anila Kidd, RN  05/05/24 9900

## 2024-05-06 VITALS
WEIGHT: 243.1 LBS | RESPIRATION RATE: 16 BRPM | HEART RATE: 93 BPM | SYSTOLIC BLOOD PRESSURE: 133 MMHG | OXYGEN SATURATION: 95 % | BODY MASS INDEX: 40.5 KG/M2 | HEIGHT: 65 IN | TEMPERATURE: 97.7 F | DIASTOLIC BLOOD PRESSURE: 68 MMHG

## 2024-05-06 PROBLEM — R11.0 NAUSEA: Status: ACTIVE | Noted: 2024-05-06

## 2024-05-06 LAB
ANION GAP SERPL CALCULATED.3IONS-SCNC: 14 MMOL/L (ref 3–16)
BASOPHILS # BLD: 0 K/UL (ref 0–0.2)
BASOPHILS NFR BLD: 0.3 %
BUN SERPL-MCNC: 31 MG/DL (ref 7–20)
CALCIUM SERPL-MCNC: 9.9 MG/DL (ref 8.3–10.6)
CHLORIDE SERPL-SCNC: 87 MMOL/L (ref 99–110)
CO2 SERPL-SCNC: 28 MMOL/L (ref 21–32)
CREAT SERPL-MCNC: 0.6 MG/DL (ref 0.6–1.2)
DEPRECATED RDW RBC AUTO: 14 % (ref 12.4–15.4)
EOSINOPHIL # BLD: 0 K/UL (ref 0–0.6)
EOSINOPHIL NFR BLD: 0.4 %
GFR SERPLBLD CREATININE-BSD FMLA CKD-EPI: >90 ML/MIN/{1.73_M2}
GLUCOSE BLD-MCNC: 140 MG/DL (ref 70–99)
GLUCOSE BLD-MCNC: 173 MG/DL (ref 70–99)
GLUCOSE SERPL-MCNC: 134 MG/DL (ref 70–99)
HCT VFR BLD AUTO: 43.8 % (ref 36–48)
HGB BLD-MCNC: 14.7 G/DL (ref 12–16)
LYMPHOCYTES # BLD: 1.7 K/UL (ref 1–5.1)
LYMPHOCYTES NFR BLD: 14.6 %
MAGNESIUM SERPL-MCNC: 3 MG/DL (ref 1.8–2.4)
MCH RBC QN AUTO: 31.1 PG (ref 26–34)
MCHC RBC AUTO-ENTMCNC: 33.5 G/DL (ref 31–36)
MCV RBC AUTO: 92.9 FL (ref 80–100)
MONOCYTES # BLD: 1.5 K/UL (ref 0–1.3)
MONOCYTES NFR BLD: 12.3 %
NEUTROPHILS # BLD: 8.6 K/UL (ref 1.7–7.7)
NEUTROPHILS NFR BLD: 72.4 %
OSMOLALITY SERPL: 291 MOSM/KG (ref 278–305)
PERFORMED ON: ABNORMAL
PERFORMED ON: ABNORMAL
PLATELET # BLD AUTO: 203 K/UL (ref 135–450)
PMV BLD AUTO: 10 FL (ref 5–10.5)
POTASSIUM SERPL-SCNC: 3.4 MMOL/L (ref 3.5–5.1)
RBC # BLD AUTO: 4.72 M/UL (ref 4–5.2)
SODIUM SERPL-SCNC: 129 MMOL/L (ref 136–145)
WBC # BLD AUTO: 11.9 K/UL (ref 4–11)

## 2024-05-06 PROCEDURE — 83735 ASSAY OF MAGNESIUM: CPT

## 2024-05-06 PROCEDURE — 80048 BASIC METABOLIC PNL TOTAL CA: CPT

## 2024-05-06 PROCEDURE — 83930 ASSAY OF BLOOD OSMOLALITY: CPT

## 2024-05-06 PROCEDURE — 36415 COLL VENOUS BLD VENIPUNCTURE: CPT

## 2024-05-06 PROCEDURE — 6370000000 HC RX 637 (ALT 250 FOR IP)

## 2024-05-06 PROCEDURE — G0378 HOSPITAL OBSERVATION PER HR: HCPCS

## 2024-05-06 PROCEDURE — 6360000002 HC RX W HCPCS

## 2024-05-06 PROCEDURE — 85025 COMPLETE CBC W/AUTO DIFF WBC: CPT

## 2024-05-06 PROCEDURE — 96365 THER/PROPH/DIAG IV INF INIT: CPT

## 2024-05-06 PROCEDURE — 2580000003 HC RX 258

## 2024-05-06 RX ORDER — POTASSIUM CHLORIDE 7.45 MG/ML
10 INJECTION INTRAVENOUS
Status: COMPLETED | OUTPATIENT
Start: 2024-05-06 | End: 2024-05-06

## 2024-05-06 RX ORDER — POTASSIUM CHLORIDE 20 MEQ/1
20 TABLET, EXTENDED RELEASE ORAL
Qty: 4 TABLET | Refills: 0 | Status: SHIPPED | OUTPATIENT
Start: 2024-05-07 | End: 2024-05-07

## 2024-05-06 RX ORDER — POTASSIUM CHLORIDE 20 MEQ/1
20 TABLET, EXTENDED RELEASE ORAL
Status: DISCONTINUED | OUTPATIENT
Start: 2024-05-07 | End: 2024-05-06 | Stop reason: HOSPADM

## 2024-05-06 RX ORDER — SPIRONOLACTONE 25 MG/1
25 TABLET ORAL DAILY
Qty: 30 TABLET | Refills: 0 | Status: SHIPPED | OUTPATIENT
Start: 2024-05-06

## 2024-05-06 RX ORDER — SPIRONOLACTONE 25 MG/1
25 TABLET ORAL DAILY
Status: DISCONTINUED | OUTPATIENT
Start: 2024-05-07 | End: 2024-05-06 | Stop reason: HOSPADM

## 2024-05-06 RX ADMIN — SPIRONOLACTONE 12.5 MG: 25 TABLET ORAL at 08:58

## 2024-05-06 RX ADMIN — FUROSEMIDE 40 MG: 40 TABLET ORAL at 08:58

## 2024-05-06 RX ADMIN — SERTRALINE HYDROCHLORIDE 100 MG: 100 TABLET ORAL at 08:58

## 2024-05-06 RX ADMIN — APIXABAN 5 MG: 5 TABLET, FILM COATED ORAL at 08:58

## 2024-05-06 RX ADMIN — LEVOTHYROXINE SODIUM 150 MCG: 150 TABLET ORAL at 06:36

## 2024-05-06 RX ADMIN — PANTOPRAZOLE SODIUM 40 MG: 40 TABLET, DELAYED RELEASE ORAL at 06:36

## 2024-05-06 RX ADMIN — SODIUM CHLORIDE, PRESERVATIVE FREE 10 ML: 5 INJECTION INTRAVENOUS at 09:00

## 2024-05-06 RX ADMIN — SIMETHICONE 80 MG: 80 TABLET, CHEWABLE ORAL at 08:59

## 2024-05-06 RX ADMIN — POTASSIUM CHLORIDE 10 MEQ: 10 INJECTION, SOLUTION INTRAVENOUS at 09:09

## 2024-05-06 RX ADMIN — HYDROCHLOROTHIAZIDE 25 MG: 25 TABLET ORAL at 08:59

## 2024-05-06 RX ADMIN — POTASSIUM CHLORIDE 10 MEQ: 10 INJECTION, SOLUTION INTRAVENOUS at 11:15

## 2024-05-06 RX ADMIN — SILDENAFIL 20 MG: 20 TABLET ORAL at 09:00

## 2024-05-06 RX ADMIN — ONDANSETRON 4 MG: 2 INJECTION INTRAMUSCULAR; INTRAVENOUS at 00:25

## 2024-05-06 RX ADMIN — DILTIAZEM HYDROCHLORIDE 120 MG: 120 CAPSULE, COATED, EXTENDED RELEASE ORAL at 08:58

## 2024-05-06 NOTE — PROGRESS NOTES
VSS, A&O, denies pain. One time does of prn Zofran was given with benefit. Pt had x2 BM during this shift. Pt tolerating diet. Fall precaution in place, bed alarm on, bed rails up, bed locked and at lowest position. Pt removes non-skid socks while in bed because she feels hot. Reminded pt to wear it when getting up. Questions were answered and needs were met during this shift, call light was used.

## 2024-05-06 NOTE — PROGRESS NOTES
Patient Flori to room 5314 from Home. Patient is A&O x 4. VSS. Patient oriented to the room all safety measures in place. Patient given IS and SCDs at this time. Admission orders released and patient 4 eyes completed. Admission documentation completed. No other needs are noted at this time.    [x] Bed alarm on and cord plugged into wall  [x] Bed in lowest position  [x] Call light and bedside table within reach  [x] Patient educated on all safety measures  []Oxygen connected to wall (if applicable)     Nurse 1 Esignature: Electronically signed by Selina Alvarado RN on 5/6/24 at 1:58 AM EDT  Nurse 2 Esignature: Electronically signed by Jose Macias RN on 5/6/24 at 5:09 AM EDT

## 2024-05-06 NOTE — PROGRESS NOTES
4 Eyes Skin Assessment     NAME:  Flori Bartholomew  YOB: 1955  MEDICAL RECORD NUMBER:  [id number]    The patient is being assessed for  Admission    I agree that at least one RN has performed a thorough Head to Toe Skin Assessment on the patient. ALL assessment sites listed below have been assessed.      Areas assessed by both nurses:    Head, Face, Ears, Shoulders, Back, Chest, Arms, Elbows, Hands, Sacrum. Buttock, Coccyx, Ischium, and Legs. Feet and Heels  Scattered tiny scabs BLE, scattered bruises, scattered blanchable redness on toes, healing tear on R upper thigh near groin.        Does the Patient have a Wound? No noted wound(s)       Mateo Prevention initiated by RN: Yes  Wound Care Orders initiated by RN: No    Pressure Injury (Stage 3,4, Unstageable, DTI, NWPT, and Complex wounds) if present, place Wound referral order by RN under : No    New Ostomies, if present place, Ostomy referral order under : No     Nurse 1 eSignature: Electronically signed by Selina Alvarado RN on 5/6/24 at 1:56 AM EDT    **SHARE this note so that the co-signing nurse can place an eSignature**    Nurse 2 eSignature: Electronically signed by Jose Macias RN on 5/6/24 at 5:09 AM EDT

## 2024-05-06 NOTE — CARE COORDINATION
Case Management Assessment            Discharge Note                    Date / Time of Note: 5/6/2024 12:56 PM                  Discharge Note Completed by: Mariaelena Arroyo    Patient Name: Flori Bartholomew   YOB: 1955  Diagnosis: Hypokalemia [E87.6]  Generalized abdominal pain [R10.84]  Constipation, unspecified constipation type [K59.00]  Intractable nausea [R11.0]  Nausea [R11.0]   Date / Time: 5/5/2024  5:30 PM    Current PCP: Arias Michaud MD  Clinic patient: No    Hospitalization in the last 30 days: No       Advance Directives:  Code Status: Full Code  [place] DNR form completed and on chart: No    Financial:  Payor: MEDICARE / Plan: MEDICARE PART A AND B / Product Type: *No Product type* /      Pharmacy:    [facility] [id number] [place], [place] - [address]  [place] [phone] - F [phone] [address] [place] [address]  Phone: [phone] Fax: [phone]      Assistance purchasing medications?:    Assistance provided by Case Management: None at this time    Does patient want to participate in local refill/ meds to beds program?:      Meds To Beds General Rules:  1. Can ONLY be done Monday- Friday between 8:30am-5pm  2. Prescription(s) must be in pharmacy by 3pm to be filled same day  3.Copy of patient's insurance/ prescription drug card and patient face sheet must be sent along with the prescription(s)  4. Cost of Rx cannot be added to hospital bill. If financial assistance is needed, please contact unit  or ;  or  CANNOT provide pharmacy voucher for patients co-pays  5. Patients can then  the prescription on their way out of the hospital at discharge, or pharmacy can deliver to the bedside if staff is available. (payment due at time of pick-up or delivery - cash, check, or card accepted)     Able to afford home medications/ co-pay costs: Yes    ADLS:  Current PT AM-PAC Score:   /24  Current OT AM-PAC

## 2024-05-06 NOTE — PROGRESS NOTES
Internal Medicine Progress Note    Date: 2024   Patient: Flori Bartholomew   Patient : 1955     CC: Constipation (Pt was released from Hospital on Friday, but states that she hasn't had a bowel movement in 6 days. C/o constipation)    Interval history  Patient seen and examined at bedside, reported no acute events overnight.  Dry heaving and nausea significantly improved  Had 2 bowel movements this morning, voiding freely  Vitals stable, afebrile saturating well on room air      HPI:     Patient is a 70 yo female pmhx Afib/flutter, pHTN, HFpEF, DMTII presenting with constipation and intractable nausea. Patient was recently admitted for SOB and was discharged home 5/3, during admission she noted that she had had no bowel movements for a week. Upon arrival home she still was unable to pass stool despite oral bowel regimen and began having worsening nausea and significant dry heaving (without vomiting). Patient able to drink liquids but has had limited PO d/t nausea. Went to [facility] yesterday and received FLEET enema with limited improvement in symptoms. Patient also reports feeling very gassy. Notably patient denies any palpitations, emesis, chest pain/pressure, SOB. Due to the above patient presented to ED.    ED Course   - AFVSS   - CT A/P w/o obstruction   - Troponin baseline   - EKG sinus, no ST elevations   - Got 1 dose zofran   - UA w/ bacteriuria, WBC <10 no pyuria, no UTI symptoms   - Admit d/t intractable nausea    PMHx:      Diagnosis Date    Depression     Diabetes mellitus (HCC)     GERD (gastroesophageal reflux disease)     Hypertension     Paroxysmal atrial fibrillation (HCC)     Thyroid disease     Typical atrial flutter (HCC)        PSurgHx:      Procedure Laterality Date    CARDIAC ELECTROPHYSIOLOGY MAPPING AND ABLATION  2024    Typical atrial flutter    CARDIAC SURGERY      CHOLECYSTECTOMY      TOTAL KNEE ARTHROPLASTY          Medication:  No medications prior to  No acute CT abnormality in the abdomen or pelvis.   2.  No evidence of bowel obstruction.   3.  Elevation of the right hemidiaphragm with right upper lung atelectasis/consolidation.         Electronically signed by Juni Pedraza MD            Assessment & Plan     Intractable nausea, constipation likely medication induced  Symptoms currently improved  Pt with no stool for >week despite PO bowel reg and enema. Able to drink water fine without vomiting (is having dry heaving) but unable to tolerate much PO. CT A/P w/o obstruction.   - SMOG enema   - BID glycolax and senna-S   - Phenergan prn   - Encourage PO   - Gas X QID    Bacteriuria  Pt with no symptoms, urine WBC <10 no pyuria   - Monitor I&Os, symptoms    Isotonic hyponatremia  Hyponatremia of 129, unclear etiology likely SIADH from continued nausea  Follow-up SIADH workup  Monitor daily renal function panel    Chronic Medical Conditions    DMTII   - Continue home flozin   - LDSSI   - POCT glucose   - Hypoglycemia protocol   - Pt on glitizone at home, contraindicated in HF, consider removing from med list at d/c    Afib/Flutter  Ablation 1/12/24, 4/10/24   - Continue home metoprolol, cardizem, eliquis   - Telemetry    HFpEF G1DD  pHTN  HTN  Possible WESLEY  Severe pHTN, suspected Group II, outpt follow up set up from last admission, has not been able to go yet for PFTs, sleep study as she was just dischaarged 5/3.  L+RHC completed 05/1: minimal CAD, R heart pressures suggestive of pulmonary hypertension but not consistent with Group I PH (PAH). Not in HF exacerbation at this time.   - Restarted home sildenafil   - Restarted home diuretics (lasix + aldactone + HCTZ)   - Cardiac diet   - Daily weights, I&Os    Hypothyroidism   - Continue home synthroid    Mood   - Continue home zoloft    DVT PPx: eliquis  Diet: ADULT DIET; Regular; Low Fat/Low Chol/High Fiber/RUBA   Code status:  Full Code     ELOS: 3  Barriers to discharge: PO tolerance  Disposition  - Preadmission:

## 2024-05-06 NOTE — H&P
Internal Medicine H&P    Date: 2024   Patient: Flori Bartholomew   Patient : 1955     CC: Constipation (Pt was released from Hospital on Friday, but states that she hasn't had a bowel movement in 6 days. C/o constipation)       Subjective     HPI:     Patient is a 68 yo female pmhx Afib/flutter, pHTN, HFpEF, DMTII presenting with constipation and intractable nausea. Patient was recently admitted for SOB and was discharged home 5/3, during admission she noted that she had had no bowel movements for a week. Upon arrival home she still was unable to pass stool despite oral bowel regimen and began having worsening nausea and significant dry heaving (without vomiting). Patient able to drink liquids but has had limited PO d/t nausea. Went to [facility] yesterday and received FLEET enema with limited improvement in symptoms. Patient also reports feeling very gassy. Notably patient denies any palpitations, emesis, chest pain/pressure, SOB. Due to the above patient presented to ED.    ED Course   - AFVSS   - CT A/P w/o obstruction   - Troponin baseline   - EKG sinus, no ST elevations   - Got 1 dose zofran   - UA w/ bacteriuria, WBC <10 no pyuria, no UTI symptoms   - Admit d/t intractable nausea    PMHx:      Diagnosis Date    Depression     Diabetes mellitus (HCC)     GERD (gastroesophageal reflux disease)     Hypertension     Paroxysmal atrial fibrillation (HCC)     Thyroid disease     Typical atrial flutter (HCC)        PSurgHx:      Procedure Laterality Date    CARDIAC ELECTROPHYSIOLOGY MAPPING AND ABLATION  2024    Typical atrial flutter    CARDIAC SURGERY      CHOLECYSTECTOMY      TOTAL KNEE ARTHROPLASTY          Medication:  Not in a hospital admission.    Allergies:  Patient has no known allergies.    SocHx:  Social History     Socioeconomic History    Marital status:    Tobacco Use    Smoking status: Never    Smokeless tobacco: Never   Vaping Use    Vaping Use: Never used   Substance

## 2024-05-06 NOTE — ED NOTES
ED TO INPATIENT SBAR HANDOFF    Patient Name: Flori Bartholomew   :  1955  69 y.o.   MRN:  [id number]  Preferred Name  Flori  ED Room #:  A12/A12-12  Family/Caregiver Present yes   Restraints no   Sitter no   Sepsis Risk Score Sepsis Risk Score: 6.76    Situation  Code Status: Prior Limited Code details: Intubation/Re-intubation No Comment; Defibrillation/Cardioversion No Comment; Chest Compressions No Comment; Resuscitative Medications No Comment; Other No Comment.    Allergies: Patient has no known allergies.  Weight: Patient Vitals for the past 96 hrs (Last 3 readings):   Weight   24 1734 110.3 kg (243 lb 1.6 oz)     Arrived from: home  Chief Complaint:   Chief Complaint   Patient presents with    Constipation     Pt was released from Hospital on Friday, but states that she hasn't had a bowel movement in 6 days. C/o constipation     Hospital Problem/Diagnosis:  Active Problems:    * No active hospital problems. *  Resolved Problems:    * No resolved hospital problems. *    Imaging:   CT ABDOMEN PELVIS W IV CONTRAST Additional Contrast? None   Final Result      1.  No acute CT abnormality in the abdomen or pelvis.   2.  No evidence of bowel obstruction.   3.  Elevation of the right hemidiaphragm with right upper lung atelectasis/consolidation.         Electronically signed by Juni Pedraza MD        Abnormal labs:   Abnormal Labs Reviewed   CBC WITH AUTO DIFFERENTIAL - Abnormal; Notable for the following components:       Result Value    WBC 13.9 (*)     Neutrophils Absolute 11.4 (*)     All other components within normal limits   COMPREHENSIVE METABOLIC PANEL W/ REFLEX TO MG FOR LOW K - Abnormal; Notable for the following components:    Sodium 132 (*)     Potassium reflex Magnesium 3.3 (*)     Chloride 87 (*)     Glucose 142 (*)     BUN 32 (*)     Total Protein 8.8 (*)     Albumin/Globulin Ratio 1.0 (*)     All other components within normal limits   URINALYSIS WITH REFLEX TO CULTURE - Abnormal; Notable

## 2024-05-07 ENCOUNTER — OFFICE VISIT (OUTPATIENT)
Dept: CARDIOLOGY CLINIC | Age: 69
End: 2024-05-07
Payer: MEDICARE

## 2024-05-07 VITALS
DIASTOLIC BLOOD PRESSURE: 66 MMHG | WEIGHT: 243 LBS | HEART RATE: 93 BPM | BODY MASS INDEX: 40.44 KG/M2 | SYSTOLIC BLOOD PRESSURE: 104 MMHG

## 2024-05-07 DIAGNOSIS — I48.3 TYPICAL ATRIAL FLUTTER (HCC): ICD-10-CM

## 2024-05-07 DIAGNOSIS — I27.20 PULMONARY HTN (HCC): ICD-10-CM

## 2024-05-07 DIAGNOSIS — I48.0 PAROXYSMAL ATRIAL FIBRILLATION (HCC): Primary | ICD-10-CM

## 2024-05-07 DIAGNOSIS — I10 BENIGN ESSENTIAL HTN: ICD-10-CM

## 2024-05-07 DIAGNOSIS — Z79.899 ENCOUNTER FOR MONITORING FLECAINIDE THERAPY: ICD-10-CM

## 2024-05-07 DIAGNOSIS — I50.32 CHRONIC HEART FAILURE WITH PRESERVED EJECTION FRACTION (HCC): ICD-10-CM

## 2024-05-07 DIAGNOSIS — Z51.81 ENCOUNTER FOR MONITORING FLECAINIDE THERAPY: ICD-10-CM

## 2024-05-07 DIAGNOSIS — R00.1 SINUS BRADYCARDIA: ICD-10-CM

## 2024-05-07 PROCEDURE — G2211 COMPLEX E/M VISIT ADD ON: HCPCS | Performed by: INTERNAL MEDICINE

## 2024-05-07 PROCEDURE — 1036F TOBACCO NON-USER: CPT | Performed by: INTERNAL MEDICINE

## 2024-05-07 PROCEDURE — G8427 DOCREV CUR MEDS BY ELIG CLIN: HCPCS | Performed by: INTERNAL MEDICINE

## 2024-05-07 PROCEDURE — 1090F PRES/ABSN URINE INCON ASSESS: CPT | Performed by: INTERNAL MEDICINE

## 2024-05-07 PROCEDURE — 93000 ELECTROCARDIOGRAM COMPLETE: CPT | Performed by: INTERNAL MEDICINE

## 2024-05-07 PROCEDURE — 3017F COLORECTAL CA SCREEN DOC REV: CPT | Performed by: INTERNAL MEDICINE

## 2024-05-07 PROCEDURE — 1111F DSCHRG MED/CURRENT MED MERGE: CPT | Performed by: INTERNAL MEDICINE

## 2024-05-07 PROCEDURE — G8400 PT W/DXA NO RESULTS DOC: HCPCS | Performed by: INTERNAL MEDICINE

## 2024-05-07 PROCEDURE — 3074F SYST BP LT 130 MM HG: CPT | Performed by: INTERNAL MEDICINE

## 2024-05-07 PROCEDURE — 1124F ACP DISCUSS-NO DSCNMKR DOCD: CPT | Performed by: INTERNAL MEDICINE

## 2024-05-07 PROCEDURE — G8417 CALC BMI ABV UP PARAM F/U: HCPCS | Performed by: INTERNAL MEDICINE

## 2024-05-07 PROCEDURE — 99214 OFFICE O/P EST MOD 30 MIN: CPT | Performed by: INTERNAL MEDICINE

## 2024-05-07 PROCEDURE — 3078F DIAST BP <80 MM HG: CPT | Performed by: INTERNAL MEDICINE

## 2024-05-07 RX ORDER — PANTOPRAZOLE SODIUM 40 MG/1
40 TABLET, DELAYED RELEASE ORAL 2 TIMES DAILY
Qty: 60 TABLET | Refills: 5 | Status: SHIPPED | OUTPATIENT
Start: 2024-05-07

## 2024-05-07 RX ORDER — SUCRALFATE 1 G/1
1 TABLET ORAL 4 TIMES DAILY
Qty: 56 TABLET | Refills: 0 | Status: SHIPPED | OUTPATIENT
Start: 2024-05-07 | End: 2024-05-21

## 2024-05-07 RX ORDER — SILDENAFIL CITRATE 20 MG/1
20 TABLET ORAL 3 TIMES DAILY
Qty: 90 TABLET | Refills: 5 | Status: SHIPPED | OUTPATIENT
Start: 2024-05-07

## 2024-05-07 NOTE — PATIENT INSTRUCTIONS
Resume Eliquis 5 mg 2x/day, sildenafil 20 mg 3x/day, and pantoprazole 40 mg 2x/day  Start Gaviscon for GI upset as needed  Start Carafate 1 gm 4x/day for the next 2 weeks

## 2024-05-13 NOTE — DISCHARGE SUMMARY
INTERNAL MEDICINE DEPARTMENT AT THE [facility]  Discharge Summary At the [facility]    Patient ID: Flori Bartholomew                                             Discharge Date: 5/13/2024   Patient's PCP: Arias Michaud MD                                          Discharge Physician: Prince Megan Castro MD MD  Admit Date: 5/5/2024   Admitting Physician: Hussain Caldwell MD    PROBLEMS DURING HOSPITALIZATION:  Present on Admission:   Intractable nausea   Nausea    Patient is a 70 yo female pmhx Afib/flutter, pHTN, HFpEF, DMTII presenting with constipation and intractable nausea. Patient was recently admitted for SOB and was discharged home 5/3, during admission she noted that she had had no bowel movements for a week. Upon arrival home she still was unable to pass stool despite oral bowel regimen and began having worsening nausea and significant dry heaving (without vomiting). Patient able to drink liquids but has had limited PO d/t nausea. Went to [facility] yesterday and received FLEET enema with limited improvement in symptoms.  Patient's bowel regimen was changed while admitted, with scheduled GlycoLax twice daily and senna daily.  Bowel function returned with patient having regular bowel movements with no blood or mucus.  Nausea and dry heaving significantly improved with as needed Phenergan and Gas-X.  On the day of discharge patient was examined at bedside, she denied any acute symptoms and had no complaints.  Abdominal symptoms have significantly improved, patient was discharged home in stable condition.      Physical Exam:  /68   Pulse 93   Temp 97.7 °F (36.5 °C) (Oral)   Resp 16   Ht 1.651 m (5' 5\")   Wt 110.3 kg (243 lb 1.6 oz)   SpO2 95%   BMI 40.45 kg/m²     Physical Exam  Constitutional:       General: She is awake. She is not in acute distress.     Appearance: She is morbidly obese. She is not ill-appearing, toxic-appearing or diaphoretic.   HENT:      Head:

## 2024-05-20 ENCOUNTER — OFFICE VISIT (OUTPATIENT)
Dept: CARDIOLOGY CLINIC | Age: 69
End: 2024-05-20
Payer: MEDICARE

## 2024-05-20 ENCOUNTER — TELEPHONE (OUTPATIENT)
Dept: CARDIOLOGY CLINIC | Age: 69
End: 2024-05-20

## 2024-05-20 VITALS
WEIGHT: 245 LBS | BODY MASS INDEX: 40.77 KG/M2 | DIASTOLIC BLOOD PRESSURE: 64 MMHG | SYSTOLIC BLOOD PRESSURE: 110 MMHG | HEART RATE: 101 BPM

## 2024-05-20 DIAGNOSIS — I48.3 TYPICAL ATRIAL FLUTTER (HCC): ICD-10-CM

## 2024-05-20 DIAGNOSIS — I27.20 PULMONARY HTN (HCC): ICD-10-CM

## 2024-05-20 DIAGNOSIS — R00.1 SINUS BRADYCARDIA: ICD-10-CM

## 2024-05-20 DIAGNOSIS — I50.32 CHRONIC HEART FAILURE WITH PRESERVED EJECTION FRACTION (HCC): ICD-10-CM

## 2024-05-20 DIAGNOSIS — I48.0 PAROXYSMAL ATRIAL FIBRILLATION (HCC): Primary | ICD-10-CM

## 2024-05-20 DIAGNOSIS — I10 BENIGN ESSENTIAL HTN: ICD-10-CM

## 2024-05-20 PROCEDURE — 3074F SYST BP LT 130 MM HG: CPT | Performed by: INTERNAL MEDICINE

## 2024-05-20 PROCEDURE — 1124F ACP DISCUSS-NO DSCNMKR DOCD: CPT | Performed by: INTERNAL MEDICINE

## 2024-05-20 PROCEDURE — 93000 ELECTROCARDIOGRAM COMPLETE: CPT | Performed by: INTERNAL MEDICINE

## 2024-05-20 PROCEDURE — 1090F PRES/ABSN URINE INCON ASSESS: CPT | Performed by: INTERNAL MEDICINE

## 2024-05-20 PROCEDURE — 3017F COLORECTAL CA SCREEN DOC REV: CPT | Performed by: INTERNAL MEDICINE

## 2024-05-20 PROCEDURE — 99214 OFFICE O/P EST MOD 30 MIN: CPT | Performed by: INTERNAL MEDICINE

## 2024-05-20 PROCEDURE — 1036F TOBACCO NON-USER: CPT | Performed by: INTERNAL MEDICINE

## 2024-05-20 PROCEDURE — G2211 COMPLEX E/M VISIT ADD ON: HCPCS | Performed by: INTERNAL MEDICINE

## 2024-05-20 PROCEDURE — 3078F DIAST BP <80 MM HG: CPT | Performed by: INTERNAL MEDICINE

## 2024-05-20 PROCEDURE — G8427 DOCREV CUR MEDS BY ELIG CLIN: HCPCS | Performed by: INTERNAL MEDICINE

## 2024-05-20 PROCEDURE — 1111F DSCHRG MED/CURRENT MED MERGE: CPT | Performed by: INTERNAL MEDICINE

## 2024-05-20 PROCEDURE — G8400 PT W/DXA NO RESULTS DOC: HCPCS | Performed by: INTERNAL MEDICINE

## 2024-05-20 PROCEDURE — G8417 CALC BMI ABV UP PARAM F/U: HCPCS | Performed by: INTERNAL MEDICINE

## 2024-05-20 RX ORDER — TORSEMIDE 20 MG/1
20 TABLET ORAL DAILY
Qty: 30 TABLET | Refills: 5 | Status: SHIPPED | OUTPATIENT
Start: 2024-05-20

## 2024-05-20 NOTE — PATIENT INSTRUCTIONS
Instead of Lasix, try torsemide 20 mg daily  Continue Protonix 40 mg 2x/day, Carafate 1 gm 4x/day (for 1 more week), Eliquis 5 mg 2x/day  Try holding sildenafil tonight and tomorrow to see if the dry heaving improves. If you feel better off the sildenafil, then we can stop it.

## 2024-05-20 NOTE — TELEPHONE ENCOUNTER
Spoke with pt she states that she is very sob and unable to eat anything. She states that these symptoms started Friday. Every time she attempts to drink something she feels nauseous . She is also having chills. Per Radha SWIFT, informed pt we could see her around 2:45. Pt declined phone visit. Wanted to come in instead.

## 2024-05-20 NOTE — PROGRESS NOTES
hypertrophy. Left   ventricular systolic function is normal with a visually estimated ejection   fraction of 55-60%. No obvious regional wall motion abnormalities noted.   Grade I diastolic dysfunction with normal LV filling pressures.   The right ventricle is mildly dilated. Right ventricular systolic function   is normal.   Mild tricuspid regurgitation.   Systolic pulmonary artery pressure (SPAP) is elevated and estimated at 61   mmHg (right atrial pressure 15 mmHg) consistent with severe pulmonary   hypertension .    Echo 1/9/24:   Summary   Normal left ventricle size, wall thickness, and systolic function with an   estimated ejection fraction of 55-60%. No regional wall motion abnormalities   are seen.   Normal diastolic function.   The right ventricle is normal in size and function.   Moderate tricuspid regurgitation.   Systolic pulmonary artery pressure (SPAP) estimated at 75 mmHg (RA pressure   15 mmHg), consistent with severe pulmonary hypertension.   IVC size is dilated (>2.1 cm) and collapses < 50% with respiration.    3. Stress Test 2012:    Negative Myoview    4. RHC/LHC 5/1/24 (Dr. Etienne):  Angiographic Findings:  Left Main: Normal   Left Anterior Descending: Normal  Circumflex: Normal  Right Coronary: Normal dominant.  There is some proximal calcification and a 15 to 20% stenosis.  Left Ventriculogram: LV gram was not performed.  The echo suggested ejection fraction of 55%.   Right ileofemoral: Normal   Contrast 65 cc  Fluoroscopy time 13.4 minutes  Air kerma 898.08 mGy  Hemodynamics:   Left Ventricular Pressure: 13  Central Aortic Pressure: 105/57  PCWP: 19  PA: 58/20   RV: 52/7  RA: 10  CO: 4.43 L/min  CI: 2.22 L/min/m²  O2 SATS: Pulmonary artery 63%  Pulmonary wedge 70.6  Right Atrium 70.8  Arterial 88.7 there is moderate to significant pulmonary artery hypertension.   Complications : none   IMPRESSIONS: Moderate to significant pulmonary hypertension  Mild coronary artery calcification on the

## 2024-05-24 ENCOUNTER — TELEPHONE (OUTPATIENT)
Dept: CARDIOLOGY CLINIC | Age: 69
End: 2024-05-24

## 2024-08-07 RX ORDER — ASPIRIN 81 MG/1
81 TABLET ORAL DAILY
Qty: 90 TABLET | Refills: 3 | Status: SHIPPED | OUTPATIENT
Start: 2024-08-07

## 2024-08-12 ENCOUNTER — TELEPHONE (OUTPATIENT)
Dept: CARDIOLOGY CLINIC | Age: 69
End: 2024-08-12

## 2024-08-12 NOTE — TELEPHONE ENCOUNTER
Spoke with pt. She doesn't have a f/u on the books currently. She is agreeable to schedule, but is pending surgery. She wishes to wait until after her surgery and will call to schedule a follow up with FW in several months. She didn't want to schedule now.

## 2024-08-16 ENCOUNTER — TELEPHONE (OUTPATIENT)
Dept: CARDIOLOGY CLINIC | Age: 69
End: 2024-08-16

## 2024-08-16 NOTE — TELEPHONE ENCOUNTER
Patient Name: Flori Bartholomew      : 1955      Procedure: Placement of Gastric Stimulator     Date of Service: [facility]      Place of service:[facility]    Anesthesia: General       Surgeon :       Requesting to hold OAC: Does not specify.     No Fax number included.     Will inform pt once cleared.

## 2024-08-16 NOTE — TELEPHONE ENCOUNTER
Low cardiac risk  If needed, okay to hold Eliquis 2 days prior    Miguelina Goodwin MD   Cardiac Electrophysiology  [facility] [place]  Office [phone]

## 2024-09-23 ENCOUNTER — OFFICE VISIT (OUTPATIENT)
Dept: PULMONOLOGY | Age: 69
End: 2024-09-23
Payer: MEDICARE

## 2024-09-23 VITALS
DIASTOLIC BLOOD PRESSURE: 78 MMHG | TEMPERATURE: 97.6 F | OXYGEN SATURATION: 94 % | HEART RATE: 95 BPM | SYSTOLIC BLOOD PRESSURE: 130 MMHG | HEIGHT: 65 IN | WEIGHT: 213 LBS | RESPIRATION RATE: 18 BRPM | BODY MASS INDEX: 35.49 KG/M2

## 2024-09-23 DIAGNOSIS — R06.02 SOB (SHORTNESS OF BREATH): ICD-10-CM

## 2024-09-23 DIAGNOSIS — J98.6 ELEVATED DIAPHRAGM: Primary | ICD-10-CM

## 2024-09-23 DIAGNOSIS — J98.4 RESTRICTIVE LUNG DISEASE: ICD-10-CM

## 2024-09-23 DIAGNOSIS — I27.20 PULMONARY HYPERTENSION (HCC): ICD-10-CM

## 2024-09-23 PROCEDURE — 3078F DIAST BP <80 MM HG: CPT | Performed by: INTERNAL MEDICINE

## 2024-09-23 PROCEDURE — 1124F ACP DISCUSS-NO DSCNMKR DOCD: CPT | Performed by: INTERNAL MEDICINE

## 2024-09-23 PROCEDURE — 99205 OFFICE O/P NEW HI 60 MIN: CPT | Performed by: INTERNAL MEDICINE

## 2024-09-23 PROCEDURE — 3075F SYST BP GE 130 - 139MM HG: CPT | Performed by: INTERNAL MEDICINE

## 2024-09-23 RX ORDER — FLUTICASONE FUROATE, UMECLIDINIUM BROMIDE AND VILANTEROL TRIFENATATE 100; 62.5; 25 UG/1; UG/1; UG/1
1 POWDER RESPIRATORY (INHALATION) DAILY
Qty: 1 EACH | Refills: 0 | Status: SHIPPED | COMMUNITY
Start: 2024-09-23

## 2024-09-24 PROBLEM — J98.4 RESTRICTIVE LUNG DISEASE: Status: ACTIVE | Noted: 2024-09-24

## 2024-09-24 PROBLEM — J98.6 ELEVATED DIAPHRAGM: Status: ACTIVE | Noted: 2024-09-24

## 2024-09-27 ENCOUNTER — HOSPITAL ENCOUNTER (OUTPATIENT)
Dept: CARDIAC REHAB | Age: 69
Setting detail: THERAPIES SERIES
Discharge: HOME | End: 2024-09-27
Attending: INTERNAL MEDICINE
Payer: MEDICARE

## 2024-09-27 DIAGNOSIS — J98.4 RESTRICTIVE LUNG DISEASE: ICD-10-CM

## 2024-09-27 PROCEDURE — 94618 PULMONARY STRESS TESTING: CPT

## 2024-10-17 PROCEDURE — 88313 SPECIAL STAINS GROUP 2: CPT | Performed by: TRANSPLANT SURGERY

## 2024-10-17 PROCEDURE — 88307 TISSUE EXAM BY PATHOLOGIST: CPT | Performed by: TRANSPLANT SURGERY

## 2024-10-18 ENCOUNTER — LAB REQUISITION (OUTPATIENT)
Dept: LAB | Facility: HOSPITAL | Age: 69
End: 2024-10-18
Payer: MEDICARE

## 2024-10-18 DIAGNOSIS — K31.84 GASTROPARESIS: ICD-10-CM

## 2024-10-21 LAB
LAB AP CASE REPORT: NORMAL
LAB AP DIAGNOSIS COMMENT: NORMAL
PATH REPORT.FINAL DX SPEC: NORMAL
PATH REPORT.GROSS SPEC: NORMAL